# Patient Record
Sex: MALE | Race: BLACK OR AFRICAN AMERICAN | Employment: OTHER | URBAN - METROPOLITAN AREA
[De-identification: names, ages, dates, MRNs, and addresses within clinical notes are randomized per-mention and may not be internally consistent; named-entity substitution may affect disease eponyms.]

---

## 2017-02-16 ENCOUNTER — OFFICE VISIT (OUTPATIENT)
Dept: INTERNAL MEDICINE CLINIC | Age: 57
End: 2017-02-16

## 2017-02-16 VITALS
SYSTOLIC BLOOD PRESSURE: 191 MMHG | WEIGHT: 210 LBS | HEART RATE: 64 BPM | TEMPERATURE: 97.1 F | HEIGHT: 73 IN | DIASTOLIC BLOOD PRESSURE: 100 MMHG | RESPIRATION RATE: 16 BRPM | OXYGEN SATURATION: 98 % | BODY MASS INDEX: 27.83 KG/M2

## 2017-02-16 DIAGNOSIS — I74.9 THROMBOEMBOLUS (HCC): ICD-10-CM

## 2017-02-16 DIAGNOSIS — I10 ESSENTIAL HYPERTENSION: Primary | ICD-10-CM

## 2017-02-16 RX ORDER — ASPIRIN 81 MG/1
TABLET ORAL DAILY
COMMUNITY
End: 2017-08-15

## 2017-02-16 RX ORDER — LISINOPRIL AND HYDROCHLOROTHIAZIDE 12.5; 2 MG/1; MG/1
TABLET ORAL
Qty: 90 TAB | Refills: 1 | Status: SHIPPED | OUTPATIENT
Start: 2017-02-16 | End: 2017-05-12

## 2017-02-16 NOTE — PROGRESS NOTES
1. Have you been to the ER, urgent care clinic since your last visit? Hospitalized since your last visit? No    2. Have you seen or consulted any other health care providers outside of the [address] [place] [address] since your last visit? Include any pap smears or colon screening.  No  Chief Complaint   Patient presents with    Blood Pressure Check     3month follow up     Not fasting

## 2017-02-16 NOTE — MR AVS SNAPSHOT
Visit Information Date & Time Provider Department Dept. Phone Encounter #  
 2/16/2017  8:15 AM Stevenson Walker MD [facility] [phone] [id number] Upcoming Health Maintenance Date Due Hepatitis C Screening 1960 DTaP/Tdap/Td series (1 - Tdap) 12/22/1979 FOBT Q 1 YEAR AGE 50-75 12/22/2008 Allergies as of 2/16/2017  Review Complete On: 2/16/2017 By: Jo Payton Severity Noted Reaction Type Reactions Levaquin [Levofloxacin]  02/25/2015    Rash Current Immunizations  Never Reviewed No immunizations on file. Not reviewed this visit You Were Diagnosed With   
  
 Codes Comments Essential hypertension    -  Primary ICD-10-CM: I10 
ICD-9-CM: 401.9 White coat hypertension     ICD-10-CM: R03.0 ICD-9-CM: 796.2 Thromboembolus ([address]     ICD-10-CM: I74.9 ICD-9-CM: 398. 9 Vitals BP Pulse Temp Resp Height(growth percentile) Weight(growth percentile) (!) 191/100 (BP 1 Location: Left arm, BP Patient Position: Sitting) 64 97.1 °F (36.2 °C) (Oral) 16 6' 1\" (1.854 m) 210 lb (95.3 kg) SpO2 BMI Smoking Status 98% 27.71 kg/m2 Never Smoker Vitals History BMI and BSA Data Body Mass Index Body Surface Area  
 27.71 kg/m 2 2.22 m 2 Preferred Pharmacy Pharmacy Name Phone [id number] [place], [address] [phone] Your Updated Medication List  
  
   
This list is accurate as of: 2/16/17  8:31 AM.  Always use your most recent med list.  
  
  
  
  
 aspirin delayed-release 81 mg tablet Take  by mouth daily. cholecalciferol 1,000 unit Cap Commonly known as:  VITAMIN D3 Take  by mouth.  
  
 lisinopril-hydroCHLOROthiazide 20-12.5 mg per tablet Commonly known as:  PRINZIDE, ZESTORETIC  
TAKE 1 TABLET BY MOUTH EVERY DAY Prescriptions Sent to Pharmacy Refills lisinopril-hydroCHLOROthiazide (PRINZIDE, ZESTORETIC) 20-12.5 mg per tablet 1 Sig: TAKE 1 TABLET BY MOUTH EVERY DAY Class: Normal  
 Pharmacy: [id number] [facility], [address]  #: [phone] [facility] SERVICES! Dear Kp Bahena: Thank you for requesting a [employer] account. Our records indicate that you already have an active [employer] account. You can access your account anytime at [email or web address] [employer]/Surface Tension Did you know that you can access your hospital and ER discharge instructions at any time in [employer]? You can also review all of your test results from your hospital stay or ER visit. Additional Information If you have questions, please visit the Frequently Asked Questions section of the [employer] website at [email or web address] Remember, P[employer]s is NOT to be used for urgent needs. For medical emergencies, dial 911. Now available from your iPhone and Android! Please provide this summary of care documentation to your next provider. Your primary care clinician is listed as [phone] [address] [place] [address] [address]. If you have any questions after today's visit, please call [phone].

## 2017-02-16 NOTE — PROGRESS NOTES
HISTORY OF PRESENT ILLNESS  Benson Gutiérerz is a 60 y.o. male. HPI  Here for htn. He is controlled at home on his med. He is up to date with labs. He has white coat htn. He sees readings of 110-140/70-90. He is not taking his asa daily. Past Medical History   Diagnosis Date    BPH (benign prostatic hyperplasia)     Hypertension     Thromboembolus ([address]      2009 & 2012     Current Outpatient Prescriptions   Medication Sig    lisinopril-hydroCHLOROthiazide (PRINZIDE, ZESTORETIC) 20-12.5 mg per tablet TAKE 1 TABLET BY MOUTH EVERY DAY    Cholecalciferol, Vitamin D3, 1,000 unit cap Take  by mouth.  aspirin delayed-release 81 mg tablet Take  by mouth daily. No current facility-administered medications for this visit. Review of Systems   All other systems reviewed and are negative. Visit Vitals    BP (!) 191/100 (BP 1 Location: Left arm, BP Patient Position: Sitting)    Pulse 64    Temp 97.1 °F (36.2 °C) (Oral)    Resp 16    Ht 6' 1\" (1.854 m)    Wt 210 lb (95.3 kg)    SpO2 98%    BMI 27.71 kg/m2     180 100 my cuff    Physical Exam   Constitutional: He appears well-developed and well-nourished. Nursing note and vitals reviewed. ASSESSMENT and PLAN  Moiz Clark was seen today for blood pressure check. Diagnoses and all orders for this visit:    Essential hypertension  -     lisinopril-hydroCHLOROthiazide (PRINZIDE, ZESTORETIC) 20-12.5 mg per tablet; TAKE 1 TABLET BY MOUTH EVERY DAY  The current medical regimen is effective;  continue present plan and medications.     White coat hypertension    Thromboembolus (HCC)  ASA 81 mg daily    Reviewed plan of care with the patient who has provided input and agrees with the goals

## 2017-04-14 ENCOUNTER — OFFICE VISIT (OUTPATIENT)
Dept: INTERNAL MEDICINE CLINIC | Age: 57
End: 2017-04-14

## 2017-04-14 VITALS
WEIGHT: 204.6 LBS | DIASTOLIC BLOOD PRESSURE: 93 MMHG | HEIGHT: 73 IN | HEART RATE: 76 BPM | RESPIRATION RATE: 16 BRPM | SYSTOLIC BLOOD PRESSURE: 154 MMHG | TEMPERATURE: 99.4 F | BODY MASS INDEX: 27.11 KG/M2

## 2017-04-14 DIAGNOSIS — R50.9 FEVER, UNSPECIFIED FEVER CAUSE: Primary | ICD-10-CM

## 2017-04-14 DIAGNOSIS — M79.10 MYALGIA: ICD-10-CM

## 2017-04-14 LAB
QUICKVUE INFLUENZA TEST: NEGATIVE
VALID INTERNAL CONTROL?: YES

## 2017-04-14 NOTE — PROGRESS NOTES
Subjective:   Nancy Quan is a 60 y.o. male who complains of nasal blockage, dry cough, myalgias, headache and fevers up to 99.4 degrees for 2 days, unchanged since that time. He denies a history of shortness of breath and wheezing. Wife tested + for flu on Sunday. Evaluation to date: none. Treatment to date: taking 200mg advil every 4 hours. .  Patient does not smoke cigarettes. Relevant PMH: htn. Current Outpatient Prescriptions   Medication Sig Dispense Refill    aspirin delayed-release 81 mg tablet Take  by mouth daily.  lisinopril-hydroCHLOROthiazide (PRINZIDE, ZESTORETIC) 20-12.5 mg per tablet TAKE 1 TABLET BY MOUTH EVERY DAY 90 Tab 1    Cholecalciferol, Vitamin D3, 1,000 unit cap Take  by mouth. Review of Systems  Pertinent items are noted in HPI. Objective:     Visit Vitals    BP (!) 154/93 (BP 1 Location: Right arm, BP Patient Position: Sitting)    Pulse 76    Temp 99.4 °F (37.4 °C) (Oral)    Resp 16    Ht 6' 1\" (1.854 m)    Wt 204 lb 9.6 oz (92.8 kg)    BMI 26.99 kg/m2     General:  alert, cooperative, no distress   Eyes: conjunctivae/corneas clear. Ears: Mild fullness bilat TM without erythema   Sinuses: Normal paranasal sinuses without tenderness   Mouth:  Lips, mucosa, and tongue normal. Teeth and gums normal   Neck: supple, symmetrical, trachea midline and no adenopathy. Lungs: clear to auscultation bilaterally   Nares   Clear discharge, mild erythema      POC influenza negative  Assessment/Plan:   viral upper respiratory illness  RTC prn. Advil/tylenol  Lots of fluids, rest.    Orders Placed This Encounter    AMB POC RAPID INFLUENZA TEST     Follow-up Disposition: Not on File.

## 2017-04-14 NOTE — PROGRESS NOTES
Results for orders placed or performed in visit on 04/14/17   AMB POC RAPID INFLUENZA TEST   Result Value Ref Range    VALID INTERNAL CONTROL POC Yes     QuickVue Influenza test Negative Negative

## 2017-04-17 ENCOUNTER — OFFICE VISIT (OUTPATIENT)
Dept: INTERNAL MEDICINE CLINIC | Age: 57
End: 2017-04-17

## 2017-04-17 VITALS
TEMPERATURE: 98.9 F | RESPIRATION RATE: 20 BRPM | HEIGHT: 73 IN | WEIGHT: 203 LBS | HEART RATE: 61 BPM | BODY MASS INDEX: 26.9 KG/M2 | DIASTOLIC BLOOD PRESSURE: 94 MMHG | SYSTOLIC BLOOD PRESSURE: 157 MMHG

## 2017-04-17 DIAGNOSIS — H61.21 HEARING LOSS OF RIGHT EAR DUE TO CERUMEN IMPACTION: Primary | ICD-10-CM

## 2017-04-17 RX ORDER — IBUPROFEN 200 MG
TABLET ORAL
COMMUNITY
End: 2017-08-15

## 2017-04-17 NOTE — MR AVS SNAPSHOT
Visit Information Date & Time Provider Department Dept. Phone Encounter #  
 4/17/2017  7:50 AM Rj Cespedes PA-C [facility] [phone] [id number] Your Appointments 8/15/2017  8:15 AM  
ROUTINE CARE with Zeferino Freeman MD  
[facility]) Appt Note: 6 MONTH F/U  
 [place] [id number] [place] [address] [address] [address] [facility] [phone] Upcoming Health Maintenance Date Due Hepatitis C Screening 1960 DTaP/Tdap/Td series (1 - Tdap) 12/22/1979 FOBT Q 1 YEAR AGE 50-75 12/22/2008 Allergies as of 4/17/2017  Review Complete On: 4/17/2017 By: Rj Cespedes PA-C Severity Noted Reaction Type Reactions Levaquin [Levofloxacin]  02/25/2015    Rash Current Immunizations  Never Reviewed No immunizations on file. Not reviewed this visit You Were Diagnosed With   
  
 Codes Comments Hearing loss of right ear due to cerumen impaction    -  Primary ICD-10-CM: H61.21 ICD-9-CM: 389.8, 380.4 Vitals BP Pulse Temp Resp Height(growth percentile) Weight(growth percentile) (!) 157/94 61 98.9 °F (37.2 °C) (Oral) 20 6' 1\" (1.854 m) 203 lb (92.1 kg) BMI Smoking Status 26.78 kg/m2 Never Smoker Vitals History BMI and BSA Data Body Mass Index Body Surface Area  
 26.78 kg/m 2 2.18 m 2 Preferred Pharmacy [facility] Name Phone [id number], [address] [phone] Your Updated Medication List  
  
   
This list is accurate as of: 4/17/17  8:21 AM.  Always use your most recent med list. ADVIL 200 mg tablet Generic drug:  ibuprofen Take  by mouth. aspirin delayed-release 81 mg tablet Take  by mouth daily. cholecalciferol 1,000 unit Cap Commonly known as:  VITAMIN D3 Take  by mouth.  
  
 lisinopril-hydroCHLOROthiazide 20-12.5 mg per tablet Commonly known as:  PRINZIDE, ZESTORETIC  
TAKE 1 TABLET BY MOUTH EVERY DAY Introducing [facility] & HE[facility] SERVICES! Dear Melony Morris: Thank you for requesting a [employer] account. Our records indicate that you already have an active [employer] account. You can access your account anytime at [email or web address] Did you know that you can access your hospital and ER discharge instructions at any time in Auxogyn? You can also review all of your test results from your hospital stay or ER visit. Additional Information If you have questions, please visit the Frequently Asked Questions section of the [employer] website at [email or web address] Remember, Aux[employer]yn is NOT to be used for urgent needs. For medical emergencies, dial 911. Now available from your iPhone and Android! Please provide this summary of care documentation to your next provider. Your primary care clinician is listed as [phone] [address] [place] [address] [address]. If you have any questions after today's visit, please call [phone].

## 2017-04-17 NOTE — PROGRESS NOTES
Reviewed record in preparation for visit and have obtained necessary documentation. Identified pt with two pt identifiers(name and ). Health Maintenance Due   Topic    Hepatitis C Screening     DTaP/Tdap/Td series (1 - Tdap)    FOBT Q 1 YEAR AGE 50-75          No chief complaint on file. Wt Readings from Last 3 Encounters:   17 203 lb (92.1 kg)   17 204 lb 9.6 oz (92.8 kg)   17 210 lb (95.3 kg)     Temp Readings from Last 3 Encounters:   17 99.4 °F (37.4 °C) (Oral)   17 97.1 °F (36.2 °C) (Oral)   16 98.4 °F (36.9 °C) (Oral)     BP Readings from Last 3 Encounters:   17 (!) 154/93   17 (!) 191/100   16 (!) 184/104     Pulse Readings from Last 3 Encounters:   17 76   17 64   16 60           Learning Assessment:  :     Learning Assessment 2017 1/3/2014   PRIMARY LEARNER Patient Patient   HIGHEST LEVEL OF EDUCATION - PRIMARY LEARNER  SOME COLLEGE SOME COLLEGE   BARRIERS PRIMARY LEARNER NONE NONE   CO-LEARNER CAREGIVER No No   PRIMARY LANGUAGE ENGLISH ENGLISH    NEED No No   LEARNER PREFERENCE PRIMARY PICTURES READING     - LISTENING     - DEMONSTRATION   LEARNING SPECIAL TOPICS - none   ANSWERED BY patient patient   RELATIONSHIP SELF SELF       Depression Screening:  :     PHQ 2 / 9, over the last two weeks 2017   Little interest or pleasure in doing things Not at all   Feeling down, depressed or hopeless Not at all   Total Score PHQ 2 0       Fall Risk Assessment:  :     No flowsheet data found. Abuse Screening:  :     Abuse Screening Questionnaire 2017   Do you ever feel afraid of your partner? N   Are you in a relationship with someone who physically or mentally threatens you? N   Is it safe for you to go home?  Y       Coordination of Care Questionnaire:  :     1) Have you been to an emergency room, urgent care clinic since your last visit? no   Hospitalized since your last visit? no             2) Have you seen or consulted any other health care providers outside of [address] [place] [address] since your last visit? no  (Include any pap smears or colon screenings in this section.)    3) Do you have an Advance Directive on file? no    4) Are you interested in receiving information on Advance Directives? YES      Patient is accompanied by self I have received verbal consent from Mihaela Morrison to discuss any/all medical information while they are present in the room.

## 2017-04-17 NOTE — PROGRESS NOTES
HISTORY OF PRESENT ILLNESS  Becca Huber is a 60 y.o. male. HPI  Patient presents to the office for evaluation of his right ear. He reports he is in the process of getting over a viral infection. He has been having some hearing loss of the right ear. He believes it is due to wax. He denies pain of the ear. He reports he does have some congestion. Review of Systems   HENT: Positive for congestion and hearing loss. Blood pressure (!) 157/94, pulse 61, temperature 98.9 °F (37.2 °C), temperature source Oral, resp. rate 20, height 6' 1\" (1.854 m), weight 203 lb (92.1 kg). Physical Exam   HENT:   Right ear- TM is not visible due to cerumen. Left ear- mild bulging of TM noted. No erythema. Canal is clean and normal in appearance. ASSESSMENT and PLAN  Edmund Quesada was seen today for ear fullness. Diagnoses and all orders for this visit:    Hearing loss of right ear due to cerumen impaction    Patient is going to have his ear irrigated today in the office. He should follow up if needed.

## 2017-05-08 ENCOUNTER — PATIENT MESSAGE (OUTPATIENT)
Dept: INTERNAL MEDICINE CLINIC | Age: 57
End: 2017-05-08

## 2017-05-12 RX ORDER — HYDROCHLOROTHIAZIDE 12.5 MG/1
12.5 TABLET ORAL DAILY
Qty: 90 TAB | Refills: 1 | Status: SHIPPED | OUTPATIENT
Start: 2017-05-12 | End: 2017-08-15

## 2017-05-12 NOTE — TELEPHONE ENCOUNTER
From: Patrica Rachel  To: Harjinder Puri MD  Sent: 5/8/2017 9:42 PM EDT  Subject: Tanesha Goods Dr. Phil Root,    I am feeling better. My readings are below. I believe taking half helped. I am including morning and evening readings.     Garett    4/23 9:05P 134/83, 4/24 4:20A 148/92, 4/24 9:44P 121/78, 4/25 4:30A 152/94, 4/25 9:15P 133/81, 4/26 5:21A 138/90, 4/26 9:48P 124/72, 4/27 4:33A 127/88, 4/27 9:43P 111/71, 4/28 5:14A 135/85, 4/29 8:21A 154/94, 4/29 11:54P 160/98, 4/30 9:29A 147/88, 4/30 9:58P 134/78, 5/1 4:29A 146/85, 5/1 9:17P 128/76, 5/2 5:57A 162/90, 5/2 9:49P 131/80, 5/3 10:26P 122/74, 5/4 4:32A 145/96, 5/4 10:39P 143/86, 5/5 4:30A 144/91, 5/5 11:10p 145/88, 5/6 5:28A 159/99, 5/6 12:14A 158/91, 5/7 10:36A 141/94

## 2017-06-05 ENCOUNTER — OFFICE VISIT (OUTPATIENT)
Dept: INTERNAL MEDICINE CLINIC | Age: 57
End: 2017-06-05

## 2017-06-05 VITALS
OXYGEN SATURATION: 99 % | SYSTOLIC BLOOD PRESSURE: 180 MMHG | RESPIRATION RATE: 20 BRPM | TEMPERATURE: 98.3 F | HEART RATE: 58 BPM | DIASTOLIC BLOOD PRESSURE: 110 MMHG | BODY MASS INDEX: 27.17 KG/M2 | HEIGHT: 73 IN | WEIGHT: 205 LBS

## 2017-06-05 DIAGNOSIS — I10 ESSENTIAL HYPERTENSION: ICD-10-CM

## 2017-06-05 DIAGNOSIS — R00.1 BRADYCARDIA: Primary | ICD-10-CM

## 2017-06-05 DIAGNOSIS — R42 DIZZINESS: ICD-10-CM

## 2017-06-05 NOTE — MR AVS SNAPSHOT
Visit Information Date & Time Provider Department Dept. Phone Encounter #  
 6/5/2017  1:20 PM Constantino Geronimo PA-C [facility] [phone] [id number] Your Appointments 8/15/2017  8:15 AM  
ROUTINE CARE with Delia Batista MD  
[facility]) Appt Note: 6 MONTH F/U  
 [place] [address] [facility] [address]  
[facility] [address] [address] [facility] [phone] Upcoming Health Maintenance Date Due Hepatitis C Screening 1960 DTaP/Tdap/Td series (1 - Tdap) 12/22/1979 FOBT Q 1 YEAR AGE 50-75 12/22/2008 Allergies as of 6/5/2017  Review Complete On: 6/5/2017 By: Constantino Geronimo PA-C Severity Noted Reaction Type Reactions Levaquin [Levofloxacin]  02/25/2015    Rash Current Immunizations  Never Reviewed No immunizations on file. Not reviewed this visit You Were Diagnosed With   
  
 Codes Comments Bradycardia    -  Primary ICD-10-CM: R00.1 ICD-9-CM: 427.89 Dizziness     ICD-10-CM: T59 ICD-9-CM: 780.4 Essential hypertension     ICD-10-CM: I10 
ICD-9-CM: 401.9 Vitals BP Pulse Temp Resp Height(growth percentile) Weight(growth percentile) (!) 180/110 (!) 119 98.3 °F (36.8 °C) (Oral) 20 6' 1\" (1.854 m) 205 lb (93 kg) SpO2 BMI Smoking Status 99% 27.05 kg/m2 Never Smoker Vitals History BMI and BSA Data Body Mass Index Body Surface Area  
 27.05 kg/m 2 2.19 m 2 Preferred Pharmacy Pharmacy Name Phone [id number] [place] [address] [phone] Your Updated Medication List  
  
   
This list is accurate as of: 6/5/17  2:09 PM.  Always use your most recent med list. ADVIL 200 mg tablet Generic drug:  ibuprofen Take  by mouth. aspirin delayed-release 81 mg tablet Take  by mouth daily. cholecalciferol 1,000 unit Cap Commonly known as:  VITAMIN D3 Take  by mouth. hydroCHLOROthiazide 12.5 mg tablet Commonly known as:  HYDRODIURIL Take 1 Tab by mouth daily. We Performed the Following REFERRAL TO CARDIOLOGY [LZA57 Custom] Referral Information Referral ID Referred By Referred To [id number] Cipriano Choudhury MD   
   [address] [address] [place] [facility], [address] [address] Phone: [phone] Visits Status Start Date End Date 1 New Request 6/5/17 6/5/18 If your referral has a status of pending review or denied, additional information will be sent to support the outcome of this decision. Introducing [facility] SERVICES! Dear María Falcon: Thank you for requesting a [employer] account. Our records indicate that you already have an active [employer]'s account. You can access your account anytime at [email or web address]. [employer] Did you know that you can access your hospital and ER discharge instructions at any time in [employer]'s? You can also review all of your test results from your hospital stay or ER visit. Additional Information If you have questions, please visit the Frequently Asked Questions section of the [employer] website at [email or web address]. [employer]/. Remember, [employer] is NOT to be used for urgent needs. For medical emergencies, dial 911. Now available from your iPhone and Android! Please provide this summary of care documentation to your next provider. Your primary care clinician is listed as [phone] [address] [place] [address] [address]. If you have any questions after today's visit, please call [phone].

## 2017-06-05 NOTE — PROGRESS NOTES
HISTORY OF PRESENT ILLNESS  Savanna Peralta is a 60 y.o. male. HPI  Patient presents to the office for a follow up visit to [facility]. He reports he had been having some dizziness and feeling like maybe his heart was skipping a beat on Thursday. Saturday his symptoms were worse so he went to [facility]. He was having dizziness and feeling like his heart was slow. He had some sob as well. He had an EKG done there and blood work. He reports today he is feeling a little better. Review of Systems   Cardiovascular: Negative for chest pain and leg swelling. Neurological: Positive for dizziness. Blood pressure (!) 180/110, pulse (!) 58, temperature 98.3 °F (36.8 °C), temperature source Oral, resp. rate 20, height 6' 1\" (1.854 m), weight 205 lb (93 kg), SpO2 99 %. Physical Exam   Constitutional: He appears well-developed and well-nourished. No distress. Cardiovascular: Normal rate and regular rhythm. No murmur heard. Pulmonary/Chest: Effort normal and breath sounds normal. He has no wheezes. Musculoskeletal: He exhibits no edema. No acute changes on EKg noted. Appears similar to EKG done at [facility].  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 He will call to make an appointment with the cardiologist for evaluation. I have ask him to take readings to take with him to this appointment.

## 2017-06-05 NOTE — PROGRESS NOTES
Reviewed record in preparation for visit and have obtained necessary documentation. Identified pt with two pt identifiers(name and ). Health Maintenance Due   Topic    Hepatitis C Screening     DTaP/Tdap/Td series (1 - Tdap)    FOBT Q 1 YEAR AGE 50-75          No chief complaint on file. Wt Readings from Last 3 Encounters:   17 205 lb (93 kg)   17 203 lb (92.1 kg)   17 204 lb 9.6 oz (92.8 kg)     Temp Readings from Last 3 Encounters:   17 98.3 °F (36.8 °C) (Oral)   17 98.9 °F (37.2 °C) (Oral)   17 99.4 °F (37.4 °C) (Oral)     BP Readings from Last 3 Encounters:   17 (!) 157/94   17 (!) 154/93   17 (!) 191/100     Pulse Readings from Last 3 Encounters:   17 61   17 76   17 64           Learning Assessment:  :     Learning Assessment 2017 1/3/2014   PRIMARY LEARNER Patient Patient   HIGHEST LEVEL OF EDUCATION - PRIMARY LEARNER  SOME COLLEGE SOME COLLEGE   BARRIERS PRIMARY LEARNER NONE NONE   CO-LEARNER CAREGIVER No No   PRIMARY LANGUAGE ENGLISH ENGLISH    NEED No No   LEARNER PREFERENCE PRIMARY PICTURES READING     - LISTENING     - DEMONSTRATION   LEARNING SPECIAL TOPICS - none   ANSWERED BY patient patient   RELATIONSHIP SELF SELF       Depression Screening:  :     PHQ over the last two weeks 2017   Little interest or pleasure in doing things Not at all   Feeling down, depressed or hopeless Not at all   Total Score PHQ 2 0       Fall Risk Assessment:  :     No flowsheet data found. Abuse Screening:  :     Abuse Screening Questionnaire 2017   Do you ever feel afraid of your partner? N   Are you in a relationship with someone who physically or mentally threatens you? N   Is it safe for you to go home? Y       Coordination of Care Questionnaire:  :     1) Have you been to an emergency room, urgent care clinic since your last visit?  yes   Hospitalized since your last visit? no             2) Have you seen or consulted any other health care providers outside of [address] [place] [address] since your last visit? yes  (Include any pap smears or colon screenings in this section.)    3) Do you have an Advance Directive on file? yes    4) Are you interested in receiving information on Advance Directives? NO      Patient is accompanied by self I have received verbal consent from Juan Miguel Kang to discuss any/all medical information while they are present in the room.

## 2017-06-22 ENCOUNTER — OFFICE VISIT (OUTPATIENT)
Dept: CARDIOLOGY CLINIC | Age: 57
End: 2017-06-22

## 2017-06-22 VITALS
WEIGHT: 202.8 LBS | DIASTOLIC BLOOD PRESSURE: 100 MMHG | BODY MASS INDEX: 26.88 KG/M2 | HEART RATE: 60 BPM | SYSTOLIC BLOOD PRESSURE: 166 MMHG | HEIGHT: 73 IN

## 2017-06-22 DIAGNOSIS — I74.9 THROMBOEMBOLUS (HCC): ICD-10-CM

## 2017-06-22 DIAGNOSIS — I10 ESSENTIAL HYPERTENSION: ICD-10-CM

## 2017-06-22 DIAGNOSIS — R00.2 PALPITATIONS: Primary | ICD-10-CM

## 2017-06-22 RX ORDER — AMLODIPINE BESYLATE 2.5 MG/1
2.5 TABLET ORAL DAILY
Qty: 30 TAB | Refills: 11 | Status: SHIPPED | OUTPATIENT
Start: 2017-06-22 | End: 2017-07-26

## 2017-06-22 NOTE — PROGRESS NOTES
HISTORY OF PRESENT ILLNESS  Wing Cisneros is a 60 y.o. male. He is referred for evaluation of palpitations. He had these a few weeks ago. They are somewhat better now, but he still has some most days between 1:00 and 3:00 in the afternoon. They are characterized as a racing heart. He also had some shortness of breath. He normally exercises thirty minutes every day doing elliptical or lifting weights without difficulty. He has no family history of premature heart disease. He has had hypertension for twenty-five years. He does not smoke or drink alcohol and does not have diabetes. He was on Lisinopril with hydrochlorothiazide up until recently and the hydrochlorothiazide was discontinued, but then restarted. He may have taken Lisinopril 20 mg previously, but is now on 10 mg with 12.5 mg of hydrochlorothiazide. His blood pressure taken at home ranges between 122/75 and 170/100. More than five years ago, he had a venous thromboembolism in his right leg. He has tried Norvasc before, but it was switched to his current medicine, but not because of side effects. He may have white coat hypertension. He takes his blood pressure at home two times per day. HPI  Patient Active Problem List   Diagnosis Code    Hypertension I10    [facility] I74.9    BPH (benign prostatic hyperplasia) N40.0    ED (erectile dysfunction) N52.9    White coat hypertension [id number]    Palpitations R00.2     Current Outpatient Prescriptions   Medication Sig Dispense Refill    amLODIPine (NORVASC) 2.5 mg tablet Take 1 Tab by mouth daily. 30 Tab 11    hydroCHLOROthiazide (HYDRODIURIL) 12.5 mg tablet Take 1 Tab by mouth daily. 90 Tab 1    ibuprofen (ADVIL) 200 mg tablet Take  by mouth.  aspirin delayed-release 81 mg tablet Take  by mouth daily.  Cholecalciferol, Vitamin D3, 1,000 unit cap Take  by mouth.        Past Medical History:   Diagnosis Date    BPH (benign prostatic hyperplasia)     Hypertension     Thromboembolus [facility])     2009 & 2012     Past Surgical History:   Procedure Laterality Date    HX COLONOSCOPY  2012       Review of Systems   Cardiovascular: Positive for palpitations. All other systems reviewed and are negative. Visit Vitals    BP (!) 166/100    Pulse 60    Ht 6' 1\" (1.854 m)    Wt 202 lb 12.8 oz (92 kg)    BMI 26.76 kg/m2       Physical Exam   Constitutional: He is oriented to person, place, and time. He appears well-nourished. HENT:   Head: Atraumatic. Eyes: Conjunctivae are normal. No scleral icterus. Neck: Neck supple. Cardiovascular: Normal rate, regular rhythm and normal heart sounds. Exam reveals no gallop and no friction rub. No murmur heard. Pulmonary/Chest: Breath sounds normal. He has no wheezes. Abdominal: Bowel sounds are normal.   Musculoskeletal: He exhibits no edema. Neurological: He is oriented to person, place, and time. Skin: Skin is dry. Nursing note and vitals reviewed. ASSESSMENT and PLAN  He certainly has high blood pressure and I believe that this should be addressed initially. Possibly, his palpitations will disappear. His heart rate is in the low 60s and, therefore, he cannot take a betablocker. For now, I will increase his Lisinopril to 20/25 mg per day by having him take two of his current pills. I will also add Norvasc 2.5 mg per day. He will return in about two weeks for an echocardiogram to assess for left ventricular hypertrophy and left ventricular function. I will see him afterward, hoping to switch him eventually back to a one pill per day Lisinopril with hydrochlorothiazide regimen.

## 2017-06-22 NOTE — MR AVS SNAPSHOT
Visit Information Date & Time Provider Department Dept. Phone Encounter #  
 6/22/2017  1:40 PM Ney Betancur MD [facility] Tayo Esteban [phone] [id number] Your Appointments 8/15/2017  8:15 AM  
ROUTINE CARE with Mariola Wolf MD  
[facility] [address] [facility]) Appt Note: 6 MONTH F/U  
 [place] [address] [facility] [address]  
[address] [facility] [address] [facility] [address] Upcoming Health Maintenance Date Due Hepatitis C Screening 1960 DTaP/Tdap/Td series (1 - Tdap) 12/22/1979 FOBT Q 1 YEAR AGE 50-75 12/22/2008 Allergies as of 6/22/2017  Review Complete On: 6/22/2017 By: Rod Kenyon LPN Severity Noted Reaction Type Reactions Levaquin [Levofloxacin]  02/25/2015    Rash Current Immunizations  Never Reviewed No immunizations on file. Not reviewed this visit You Were Diagnosed With   
  
 Codes Comments Palpitations    -  Primary ICD-10-CM: R00.2 ICD-9-CM: 785.1 Vitals BP Pulse Height(growth percentile) Weight(growth percentile) BMI Smoking Status (!) 166/100 60 6' 1\" (1.854 m) 202 lb 12.8 oz (92 kg) 26.76 kg/m2 Never Smoker Vitals History BMI and BSA Data Body Mass Index Body Surface Area  
 26.76 kg/m 2 2.18 m 2 Preferred Pharmacy Pharmacy Name Phone [id number] [place] [address] [phone] Your Updated Medication List  
  
   
This list is accurate as of: 6/22/17  2:21 PM.  Always use your most recent med list. ADVIL 200 mg tablet Generic drug:  ibuprofen Take  by mouth. aspirin delayed-release 81 mg tablet Take  by mouth daily. cholecalciferol 1,000 unit Cap Commonly known as:  VITAMIN D3 Take  by mouth. hydroCHLOROthiazide 12.5 mg tablet Commonly known as:  HYDRODIURIL  
 Take 1 Tab by mouth daily. We Performed the Following AMB POC EKG ROUTINE W/ 12 LEADS, INTER & REP [17762 CPT(R)] Introducing [facility] & HEALTH SERVICES! Dear Dominik Constantino: Thank you for requesting a [email or web address] account. Our records indicate that you already have an active [email or web address] account. You can access your account anytime at [email or web address]. [email or web address] Did you know that you can access your hospital and ER discharge instructions at any time in [email or web address]? You can also review all of your test results from your hospital stay or ER visit. Additional Information If you have questions, please visit the Frequently Asked Questions section of the [email or web address] website at [email or web address] Remember, [email or web address] is NOT to be used for urgent needs. For medical emergencies, dial 911. Now available from your iPhone and Android! Please provide this summary of care documentation to your next provider. Your primary care clinician is listed as [phone] [address] [place] [address] [address]. If you have any questions after today's visit, please call [phone].

## 2017-07-10 ENCOUNTER — CLINICAL SUPPORT (OUTPATIENT)
Dept: CARDIOLOGY CLINIC | Age: 57
End: 2017-07-10

## 2017-07-10 ENCOUNTER — OFFICE VISIT (OUTPATIENT)
Dept: CARDIOLOGY CLINIC | Age: 57
End: 2017-07-10

## 2017-07-10 VITALS
BODY MASS INDEX: 27.11 KG/M2 | HEIGHT: 73 IN | SYSTOLIC BLOOD PRESSURE: 158 MMHG | WEIGHT: 204.6 LBS | HEART RATE: 60 BPM | DIASTOLIC BLOOD PRESSURE: 90 MMHG

## 2017-07-10 DIAGNOSIS — I10 ESSENTIAL HYPERTENSION: ICD-10-CM

## 2017-07-10 DIAGNOSIS — R00.2 PALPITATIONS: Primary | ICD-10-CM

## 2017-07-10 DIAGNOSIS — R00.2 PALPITATIONS: ICD-10-CM

## 2017-07-10 DIAGNOSIS — I74.9 THROMBOEMBOLUS (HCC): ICD-10-CM

## 2017-07-10 NOTE — PROGRESS NOTES
HISTORY OF PRESENT ILLNESS  Elizabeth Gillette is a 60 y.o. male. He has hypertension and palpitations. When I last saw him, he was started on amlodipine 2.5 mg per day and his palpitations are essentially gone. He also has white coat hypertension. His blood pressure at home ranges between 311 and 597 systolic, but it is higher today. He was supposed to return early for an echo, but he is going to have the echo later today since he did not arrive in time. HPI    Review of Systems   Cardiovascular: Positive for palpitations. All other systems reviewed and are negative. Visit Vitals    /90    Pulse 60    Ht 6' 1\" (1.854 m)    Wt 204 lb 9.6 oz (92.8 kg)    BMI 26.99 kg/m2     Patient Active Problem List   Diagnosis Code    Hypertension I10    Thromboembolus ([facility] I74.9    BPH (benign prostatic hyperplasia) N40.0    ED (erectile dysfunction) N52.9    White coat hypertension [id number]    Palpitations R00.2     Current Outpatient Prescriptions   Medication Sig Dispense Refill    amLODIPine (NORVASC) 2.5 mg tablet Take 1 Tab by mouth daily. 30 Tab 11    hydroCHLOROthiazide (HYDRODIURIL) 12.5 mg tablet Take 1 Tab by mouth daily. 90 Tab 1    ibuprofen (ADVIL) 200 mg tablet Take  by mouth.  aspirin delayed-release 81 mg tablet Take  by mouth daily.  Cholecalciferol, Vitamin D3, 1,000 unit cap Take  by mouth. Past Medical History:   Diagnosis Date    BPH (benign prostatic hyperplasia)     Hypertension     Thromboembolus [facility])     2009 & 2012     Past Surgical History:   Procedure Laterality Date    HX COLONOSCOPY  2012       Physical Exam   Constitutional: He is oriented to person, place, and time. He appears well-nourished. HENT:   Head: Atraumatic. Eyes: Conjunctivae are normal.   Neck: Neck supple. Cardiovascular: Normal rate, regular rhythm and normal heart sounds. Exam reveals no gallop and no friction rub. No murmur heard.   Pulmonary/Chest: Breath sounds normal. He has no wheezes. Abdominal: Bowel sounds are normal.   Musculoskeletal: He exhibits no edema. Neurological: He is oriented to person, place, and time. Skin: Skin is dry. Nursing note and vitals reviewed. ASSESSMENT and PLAN  I talked to him today about possibly increasing the Norvasc to 5 mg per day, but he would like to leave things as they are, especially since his palpitations have resolved. His heart rate is still about 60 beats per minute so he cannot tolerate a betablocker. I will plan to see him in the future on an as-needed basis and we will call him regarding the results of his echo.

## 2017-07-10 NOTE — MR AVS SNAPSHOT
Visit Information Date & Time Provider Department Dept. Phone Encounter #  
 7/10/2017  1:40 PM Dana Wharton MD [facility] Promise Mccarthy [phone] [id number] Your Appointments 8/15/2017  8:15 AM  
ROUTINE CARE with Olga Lidia Ponce MD  
[facility]) Appt Note: 6 MONTH F/U  
 [place] [address] [facility] [address]  
[facility] [address] [address] Upcoming Health Maintenance Date Due Hepatitis C Screening 1960 DTaP/Tdap/Td series (1 - Tdap) 12/22/1979 FOBT Q 1 YEAR AGE 50-75 12/22/2008 Allergies as of 7/10/2017  Review Complete On: 7/10/2017 By: Carlos Manuel Dowling LPN Severity Noted Reaction Type Reactions Levaquin [Levofloxacin]  02/25/2015    Rash Current Immunizations  Never Reviewed No immunizations on file. Not reviewed this visit Vitals BP Pulse Height(growth percentile) Weight(growth percentile) BMI Smoking Status 158/90 60 6' 1\" (1.854 m) 204 lb 9.6 oz (92.8 kg) 26.99 kg/m2 Never Smoker BMI and BSA Data Body Mass Index Body Surface Area  
 26.99 kg/m 2 2.19 m 2 Preferred Pharmacy Pharmacy Name Phone [id number] [place], [address] [phone] Your Updated Medication List  
  
   
This list is accurate as of: 7/10/17  1:56 PM.  Always use your most recent med list. ADVIL 200 mg tablet Generic drug:  ibuprofen Take  by mouth. amLODIPine 2.5 mg tablet Commonly known as:  Leward Ellis Take 1 Tab by mouth daily. aspirin delayed-release 81 mg tablet Take  by mouth daily. cholecalciferol 1,000 unit Cap Commonly known as:  VITAMIN D3 Take  by mouth. hydroCHLOROthiazide 12.5 mg tablet Commonly known as:  HYDRODIURIL Take 1 Tab by mouth daily. Introducing [facility] & HEALTH SERVICES! Dear Wendell Cockayne: Thank you for requesting a [employer] account. Our records indicate that you already have an active [employer] account. You can access your account anytime at [email or web address] Did you know that you can access your hospital and ER discharge instructions at any time in [employer]? You can also review all of your test results from your hospital stay or ER visit. Additional Information If you have questions, please visit the Frequently Asked Questions section of the [employer] website at [email or web address] Remember, [employer] is NOT to be used for urgent needs. For medical emergencies, dial 911. Now available from your iPhone and Android! Please provide this summary of care documentation to your next provider. Your primary care clinician is listed as [phone] [address] [place] [address] [address]. If you have any questions after today's visit, please call [phone].

## 2017-07-24 ENCOUNTER — TELEPHONE (OUTPATIENT)
Dept: CARDIOLOGY CLINIC | Age: 57
End: 2017-07-24

## 2017-07-24 NOTE — TELEPHONE ENCOUNTER
Pt returning Bella's phone call. Patient stated he has My Chart and saw his results but if you need to speak with him he can be reached at [phone].   Thanks, AP

## 2017-07-26 ENCOUNTER — HOSPITAL ENCOUNTER (EMERGENCY)
Age: 57
Discharge: HOME | End: 2017-07-26
Attending: EMERGENCY MEDICINE
Payer: COMMERCIAL

## 2017-07-26 VITALS
SYSTOLIC BLOOD PRESSURE: 196 MMHG | HEART RATE: 57 BPM | WEIGHT: 200 LBS | OXYGEN SATURATION: 98 % | RESPIRATION RATE: 18 BRPM | DIASTOLIC BLOOD PRESSURE: 108 MMHG | HEIGHT: 72 IN | TEMPERATURE: 98.8 F | BODY MASS INDEX: 27.09 KG/M2

## 2017-07-26 DIAGNOSIS — I10 ESSENTIAL HYPERTENSION: ICD-10-CM

## 2017-07-26 DIAGNOSIS — I82.531: ICD-10-CM

## 2017-07-26 DIAGNOSIS — M10.9 ACUTE GOUT INVOLVING TOE OF RIGHT FOOT, UNSPECIFIED CAUSE: Primary | ICD-10-CM

## 2017-07-26 PROCEDURE — 93971 EXTREMITY STUDY: CPT

## 2017-07-26 PROCEDURE — 74011250637 HC RX REV CODE- 250/637: Performed by: NURSE PRACTITIONER

## 2017-07-26 PROCEDURE — 99284 EMERGENCY DEPT VISIT MOD MDM: CPT

## 2017-07-26 RX ORDER — AMLODIPINE BESYLATE 10 MG/1
5 TABLET ORAL DAILY
Qty: 10 TAB | Refills: 0 | Status: SHIPPED | OUTPATIENT
Start: 2017-07-26 | End: 2017-08-15

## 2017-07-26 RX ORDER — CLONIDINE HYDROCHLORIDE 0.1 MG/1
0.2 TABLET ORAL
Status: COMPLETED | OUTPATIENT
Start: 2017-07-26 | End: 2017-07-26

## 2017-07-26 RX ORDER — PROBENECID AND COLCHICINE 500; .5 MG/1; MG/1
1 TABLET ORAL DAILY
Qty: 7 TAB | Refills: 0 | Status: SHIPPED | OUTPATIENT
Start: 2017-07-26 | End: 2017-08-15

## 2017-07-26 RX ADMIN — CLONIDINE HYDROCHLORIDE 0.2 MG: 0.1 TABLET ORAL at 12:44

## 2017-07-26 NOTE — PROCEDURES
[facility] TATIANA DORADO  *** FINAL REPORT ***    Name: Neo Reyez  MRN: [id number]  : 22 Dec 1960  HIS Order #: [id number]  Zach Aldrich Visit #: [id number]  Date: 2017    TYPE OF TEST: Peripheral Venous Testing    REASON FOR TEST  Pain in limb, Limb swelling, hx RLE DVT    Right Leg:-  Deep venous thrombosis:           Yes  Proximal extent of thrombus:      Popliteal At The Knee  Superficial venous thrombosis:    No  Deep venous insufficiency:        Not examined  Superficial venous insufficiency: Not examined      INTERPRETATION/FINDINGS  PROCEDURE:  Color duplex ultrasound imaging of lower extremity veins. FINDINGS:       Right: Consistent with thrombosis involving the popliteal vein as   demonstrated by vein non-compressibility, and by a narrowing or  occlusion of the flow channel on color Doppler imaging. The popliteal   contains collateralized flow. The remaining segments; common  femoral, deep femoral, femoral, posterior tibial, peroneal, and great  saphenous are patent and without evidence of thrombus; each is is  fully compressible and there is no narrowing of the flow channel on  color Doppler imaging. There is limited visualization of the peroneal  veins. Phasic flow is observed in the common femoral vein. Left:   The common femoral vein is patent and without evidence of   thrombus. Phasic flow is observed. This extremity was not otherwise   evaluated. IMPRESSION:  No evidence of right lower extremity acute vein  thrombosis. Consistent with chronic popliteal vein thrombus. ADDITIONAL COMMENTS    I have personally reviewed the data relevant to the interpretation of  this  study.     TECHNOLOGIST: Cherrie Garrett RVT  Signed: 2017 11:44 AM    PHYSICIAN: Chris Cho MD  Signed: 2017 06:50 AM

## 2017-07-26 NOTE — ED PROVIDER NOTES
HPI Comments: May Ku is a 59 yo BM presenting to ED via car with c/o Sent by Pt. First for right foot with pain that radiates up into calf. Did take long flight few days ago from [facility] to [place] and from [place] back to [place] [address]. Hx blood clot. States +redness. Pt states that he has a hx of DVT for which he was treated with Heparin greater than 5 years ago. Pt states that he has had US in the past for DVT which has been negative. PCP: Arjun Ceron MD    There were no other complaints, changes, physical findings at this time. Patient is a 60 y.o. male presenting with foot swelling. The history is provided by the patient. No  was used. Foot Swelling    This is a new problem. The problem has not changed since onset. The pain is present in the right foot. The pain is at a severity of 5/10. The pain is moderate. Associated symptoms include numbness. Pertinent negatives include no neck pain. The symptoms are aggravated by movement. He has tried nothing for the symptoms. The treatment provided no relief. There has been no history of extremity trauma. history of DVT        Past Medical History:   Diagnosis Date    BPH (benign prostatic hyperplasia)     Hypertension     Thromboembolus ([address]     2009 & 2012       Past Surgical History:   Procedure Laterality Date    HX COLONOSCOPY  2012         Family History:   Problem Relation Age of Onset    Cancer Mother      multiple myeloma    Diabetes Mother     Hypertension Mother     Stroke Mother     Cancer Father      pancreatic    Heart Disease Father     Hypertension Father        Social History     Social History    Marital status:      Spouse name: N/A    Number of children: N/A    Years of education: N/A     Occupational History    Not on file.      Social History Main Topics    Smoking status: Never Smoker    Smokeless tobacco: Never Used    Alcohol use No    Drug use: No    Sexual activity: Yes     Partners: Female      Comment:  [employer]     Other Topics Concern    Not on file     Social History Narrative         ALLERGIES: Levaquin [levofloxacin]    Review of Systems   Constitutional: Negative. Negative for chills, diaphoresis and fever. HENT: Negative. Negative for congestion, rhinorrhea and trouble swallowing. Eyes: Negative. Respiratory: Negative. Negative for shortness of breath. Cardiovascular: Negative. Gastrointestinal: Negative. Negative for abdominal pain, nausea and vomiting. Endocrine: Negative. Musculoskeletal: Positive for arthralgias and gait problem. Negative for myalgias, neck pain and neck stiffness. Skin: Positive for color change. Negative for rash. Painful red right great toe   Allergic/Immunologic: Negative. Neurological: Positive for numbness. Negative for dizziness, syncope, weakness and headaches. Hematological: Negative. Psychiatric/Behavioral: Negative. Vitals:    07/26/17 1229 07/26/17 1231 07/26/17 1314 07/26/17 1351   BP: (!) 220/120 (!) 212/118 (!) 215/106 (!) 196/108   Pulse: (!) 57      Resp: 18      Temp:       SpO2: 98%      Weight:       Height:                Physical Exam   Constitutional: He is oriented to person, place, and time. Vital signs are normal. He appears well-developed and well-nourished. Non-toxic appearance. He does not have a sickly appearance. He does not appear ill. HENT:   Head: Normocephalic and atraumatic. Eyes: Conjunctivae, EOM and lids are normal. Pupils are equal, round, and reactive to light. Neck: Trachea normal, normal range of motion and full passive range of motion without pain. Neck supple. Cardiovascular: Normal rate, regular rhythm, normal heart sounds and normal pulses. Pulmonary/Chest: Effort normal and breath sounds normal.   Abdominal: Soft. Normal appearance and bowel sounds are normal.   Musculoskeletal: Normal range of motion.         Right lower leg: He exhibits swelling. Legs:       Right foot: There is tenderness. Feet:    Neurological: He is alert and oriented to person, place, and time. He has normal strength. GCS eye subscore is 4. GCS verbal subscore is 5. GCS motor subscore is 6. Skin: Skin is warm, dry and intact. Psychiatric: He has a normal mood and affect. His speech is normal and behavior is normal. Judgment and thought content normal. Cognition and memory are normal.   Nursing note and vitals reviewed. MDM  Number of Diagnoses or Management Options  Acute gout involving toe of right foot, unspecified cause: minor  Chronic thromboembolism of popliteal vein, right ([facility] [address]): minor  Essential hypertension: minor     Amount and/or Complexity of Data Reviewed  Tests in the radiology section of CPT®: ordered  Discuss the patient with other providers: yes ([facility] and [place])    Risk of Complications, Morbidity, and/or Mortality  Presenting problems: moderate  Diagnostic procedures: moderate  Management options: low    Patient Progress  Patient progress: stable    ED Course       Procedures    CONSULT NOTE:   12:15 PM  Mello Constantino FNP-BC spoke with Kalie Cooper,   Specialty: Vasc Surgery  Discussed pt's hx, disposition, and available diagnostic and imaging results. Reviewed care plans. Consultant agrees with plans as outlined. Agrees with the dx. Will order 20-30 mm below knee compression stocking. Will have pt f/u with Dr Kalie Cooper in 4-8 weeks. Will have patient tell the office he needs a venous doppler. 12:31 PM  I have just reevaluated the patient. I have reviewed His vital signs and determined there is currently no worsening in their condition or physical exam.  Results have been reviewed with them and their questions have been answered. We will continue to review further results as they come available. A repeat BP shows that the patient is still hypertensive.   I will give him a little catapres and once his BP comes down, I will d/c him home with some additional BP medications. Beatrice Boast Pagé FNP-BC    2:06 PM  I have just reevaluated the patient. I have reviewed His vital signs and determined there is currently no worsening in their condition or physical exam.  Results have been reviewed with them and their questions have been answered. We will continue to review further results as they come available. The patient's BP is coming down. I will d/c him home with some additional BP meds and have him f/u with PCP    Beatrice Boast Pagé FNP-BC      LABORATORY TESTS:  No results found for this or any previous visit (from the past 12 hour(s)). IMAGING RESULTS:    CT Results  (Last 48 hours)    None        PFT Results  (Last 48 hours)    None        Echo Results  (Last 48 hours)    None        CXR Results  (Last 48 hours)    None        VENOUS DOPPLER results  (Last 48 hours)    None            []Hide copied text  []Hover for attribution information     [place]  PRELIMINARY REPORT      Name: Marbella [place]  MRN: [id number]  : 22 Dec 1960  HIS Order #: [id number]  [id number] [facility] Visit #: [id number]  Date: 2017     TYPE OF TEST: Peripheral Venous Testing     REASON FOR TEST  Pain in limb, Limb swelling, hx RLE DVT     Right Leg:-  Deep venous thrombosis:           No  Superficial venous thrombosis:    No  Deep venous insufficiency:        Not examined  Superficial venous insufficiency: Not examined        INTERPRETATION/FINDINGS  PROCEDURE:  Color duplex ultrasound imaging of lower extremity veins.     FINDINGS:       Right: Consistent with thrombosis involving the popliteal vein as   demonstrated by vein non-compressibility, and by a narrowing or  occlusion of the flow channel on color Doppler imaging. The popliteal   contains collateralized flow.   The remaining segments; common  femoral, deep femoral, femoral, posterior tibial, peroneal, and great  saphenous are patent and without evidence of thrombus; each is is  fully compressible and there is no narrowing of the flow channel on  color Doppler imaging. There is limited visualization of the peroneal  veins. Phasic flow is observed in the common femoral vein. Left:   The common femoral vein is patent and without evidence of   thrombus. Phasic flow is observed. This extremity was not otherwise   evaluated.     IMPRESSION:  No evidence of right lower extremity acute vein  thrombosis. Consistent with chronic popliteal vein thrombus.     ADDITIONAL COMMENTS     I have personally reviewed the data relevant to the interpretation of  this study.     TECHNOLOGIST: Marielle Perez RVT  Signed: 07/26/2017 11:44 AM            MEDICATIONS GIVEN:  Medications   cloNIDine HCl (CATAPRES) tablet 0.2 mg (0.2 mg Oral Given 7/26/17 1244)       IMPRESSION:  1. Acute gout involving toe of right foot, unspecified cause    2. Chronic thromboembolism of popliteal vein, right ([address]    3. Essential hypertension        PLAN:  1. Colchicine and Probenecid x 7days  2. 20-30 mmHg below the knee compression stocking  3. F/U with PCP   4. F/U with Dr Stephon Herrera in 4-8 weeks  Return to ED if worse    Discharge Note  2:10 PM  The patient is ready for discharge. The patient's signs, symptoms, diagnosis, and discharge instructions have been discussed and the patient has conveyed their understanding. The patient is to follow up as recommended or return to the ER should their symptoms worsen. Plan has been discussed and the patient is in agreement. Radha Donaldson Pagé FNP-BC.

## 2017-07-26 NOTE — ED TRIAGE NOTES
Sent by Pt. First for right foot with pain that radiates up into calf. Did take long flight few days ago. Hx blood clot. States +redness.  Unable to visualize in triage

## 2017-07-26 NOTE — DISCHARGE INSTRUCTIONS
Acute High Blood Pressure: Care Instructions  Your Care Instructions  Acute high blood pressure is very high blood pressure. It's a serious problem. Very high blood pressure can damage your brain, heart, eyes, and kidneys. You may have been given medicines to lower your blood pressure. You may have gotten them as pills or through a needle in one of your veins. This is called an IV. And maybe you were given other medicines too. These can be needed when high blood pressure causes other problems. To keep your blood pressure at a lower level, you may need to make healthy lifestyle changes. And you will probably need to take medicines. Be sure to follow up with your doctor about your blood pressure and what you can do about it. Follow-up care is a key part of your treatment and safety. Be sure to make and go to all appointments, and call your doctor if you are having problems. It's also a good idea to know your test results and keep a list of the medicines you take. How can you care for yourself at home? · See your doctor as often as he or she recommends. This is to make sure your blood pressure is under control. You will probably go at least 2 times a year. But it may be more often at first.  · Take your blood pressure medicine exactly as prescribed. You may take one or more types. They include diuretics, beta-blockers, ACE inhibitors, calcium channel blockers, and angiotensin II receptor blockers. Call your doctor if you think you are having a problem with your medicine. · If you take blood pressure medicine, talk to your doctor before you take decongestants or anti-inflammatory medicine, such as ibuprofen. These can raise blood pressure. · Learn how to check your blood pressure at home. Check it often. · Ask your doctor if it's okay to drink alcohol. · Talk to your doctor about lifestyle changes that can help blood pressure. These include being active and not smoking.   When should you call for help?  Call 911 anytime you think you may need emergency care. This may mean having symptoms that suggest that your blood pressure is causing a serious heart or blood vessel problem. Your blood pressure may be over 180/110. For example, call 911 if:  · You have symptoms of a heart attack. These may include:  ¨ Chest pain or pressure, or a strange feeling in the chest.  ¨ Sweating. ¨ Shortness of breath. ¨ Nausea or vomiting. ¨ Pain, pressure, or a strange feeling in the back, neck, jaw, or upper belly or in one or both shoulders or arms. ¨ Lightheadedness or sudden weakness. ¨ A fast or irregular heartbeat. · You have symptoms of a stroke. These may include:  ¨ Sudden numbness, tingling, weakness, or loss of movement in your face, arm, or leg, especially on only one side of your body. ¨ Sudden vision changes. ¨ Sudden trouble speaking. ¨ Sudden confusion or trouble understanding simple statements. ¨ Sudden problems with walking or balance. ¨ A sudden, severe headache that is different from past headaches. · You have severe back or belly pain. Do not wait until your blood pressure comes down on its own. Get help right away. Call your doctor now or seek immediate care if:  · Your blood pressure is much higher than normal (such as 180/110 or higher), but you don't have symptoms. · You think high blood pressure is causing symptoms, such as:  ¨ Severe headache. ¨ Blurry vision. Watch closely for changes in your health, and be sure to contact your doctor if:  · Your blood pressure measures 140/90 or higher at least 2 times. That means the top number is 140 or higher or the bottom number is 90 or higher, or both. · You think you may be having side effects from your blood pressure medicine. · Your blood pressure is usually normal, but it goes above normal at least 2 times. Where can you learn more? Go to http://chai-anahy.info/.   Enter B543 in the search box to learn more about \"Acute High Blood Pressure: Care Instructions. \"  Current as of: August 8, 2016  Content Version: 11.3  © 1573-4020 [employer]. Care instructions adapted under license by [employer] (which disclaims liability or warranty for this information). If you have questions about a medical condition or this instruction, always ask your healthcare professional. [address] any warranty or liability for your use of this information. Gout: Care Instructions  Your Care Instructions  Gout is a form of arthritis caused by a buildup of uric acid crystals in a joint. It causes sudden attacks of pain, swelling, redness, and stiffness, usually in one joint, especially the big toe. Gout usually comes on without a cause. But it can be brought on by drinking alcohol (especially beer) or eating seafood and red meat. Taking certain medicines, such as diuretics or aspirin, also can bring on an attack of gout. Taking your medicines as prescribed and following up with your doctor regularly can help you avoid gout attacks in the future. Follow-up care is a key part of your treatment and safety. Be sure to make and go to all appointments, and call your doctor if you are having problems. Its also a good idea to know your test results and keep a list of the medicines you take. How can you care for yourself at home? · If the joint is swollen, put ice or a cold pack on the area for 10 to 20 minutes at a time. Put a thin cloth between the ice and your skin. · Prop up the sore limb on a pillow when you ice it or anytime you sit or lie down during the next 3 days. Try to keep it above the level of your heart. This will help reduce swelling. · Rest sore joints. Avoid activities that put weight or strain on the joints for a few days. Take short rest breaks from your regular activities during the day. · Take your medicines exactly as prescribed.  Call your doctor if you think you are having a problem with your medicine. · Take pain medicines exactly as directed. ¨ If the doctor gave you a prescription medicine for pain, take it as prescribed. ¨ If you are not taking a prescription pain medicine, ask your doctor if you can take an over-the-counter medicine. · Eat less seafood and red meat. · Check with your doctor before drinking alcohol. · Losing weight, if you are overweight, may help reduce attacks of gout. But do not go on a [employer]. \" Losing a lot of weight in a short amount of time can cause a gout attack. When should you call for help? Call your doctor now or seek immediate medical care if:  · You have a fever. · The joint is so painful you cannot use it. · You have sudden, unexplained swelling, redness, warmth, or severe pain in one or more joints. Watch closely for changes in your health, and be sure to contact your doctor if:  · You have joint pain. · Your symptoms get worse or are not improving after 2 or 3 days. Where can you learn more? Go to http://chai-anahy.info/. Enter Q545 in the search box to learn more about \"Gout: Care Instructions. \"  Current as of: October 31, 2016  Content Version: 11.3  © 8008-6177 [address]. Care instructions adapted under license by [employer] (which disclaims liability or warranty for this information). If you have questions about a medical condition or this instruction, always ask your healthcare professional. [place] [address] any warranty or liability for your use of this information. Deep Vein Thrombosis: Care Instructions  Your Care Instructions    A deep vein thrombosis (DVT) is a blood clot in certain veins of the legs, pelvis, or arms. Blood clots in these veins need to be treated because they can get bigger, break loose, and travel through the bloodstream to the lungs. A blood clot in a lung can be life-threatening.   The doctor may have given you a blood thinner (anticoagulant). A blood thinner can stop the blood clot from growing larger and prevent new clots from forming. You will need to take a blood thinner for 3 to 6 months or longer. The doctor has checked you carefully, but problems can develop later. If you notice any problems or new symptoms, get medical treatment right away. Follow-up care is a key part of your treatment and safety. Be sure to make and go to all appointments, and call your doctor if you are having problems. It's also a good idea to know your test results and keep a list of the medicines you take. How can you care for yourself at home? · Take your medicines exactly as prescribed. Call your doctor if you think you are having a problem with your medicine. · If you are taking a blood thinner, be sure you get instructions about how to take your medicine safely. Blood thinners can cause serious bleeding problems. · Wear compression stockings if your doctor recommends them. These stockings are tighter at the feet than on the legs. They may reduce pain and swelling in your legs. But there are different types of stockings, and they need to fit right. So your doctor will recommend what you need. · When you sit, use a pillow to raise the arm or leg that has the blood clot. Try to keep it above the level of your heart. When should you call for help? Call 911 anytime you think you may need emergency care. For example, call if:  · You passed out (lost consciousness). · You have symptoms of a blood clot in your lung (called a pulmonary embolism). These include:  ¨ Sudden chest pain. ¨ Trouble breathing. ¨ Coughing up blood. Call your doctor now or seek immediate medical care if:  · You have new or worse trouble breathing. · You are dizzy or lightheaded, or you feel like you may faint. · You have symptoms of a blood clot in your arm or leg. These may include:  ¨ Pain in the arm, calf, back of the knee, thigh, or groin.   ¨ Redness and swelling in the arm, leg, or groin. Watch closely for changes in your health, and be sure to contact your doctor if:  · You do not get better as expected. Where can you learn more? Go to http://chai-anahy.info/. Enter X773 in the search box to learn more about \"Deep Vein Thrombosis: Care Instructions. \"  Current as of: March 20, 2017  Content Version: 11.3  © 2107-7292 [employer]. Care instructions adapted under license by [employer] (which disclaims liability or warranty for this information). If you have questions about a medical condition or this instruction, always ask your healthcare professional. [address] any warranty or liability for your use of this information. We hope that we have addressed all of your medical concerns. The examination and treatment you received in the Emergency Department were for an emergent problem and were not intended as complete care. It is important that you follow up with your healthcare provider(s) for ongoing care. If your symptoms worsen or do not improve as expected, and you are unable to reach your usual health care provider(s), you should return to the Emergency Department. Today's healthcare is undergoing tremendous change, and patient satisfaction surveys are one of the many tools to assess the quality of medical care. You may receive a survey from the [facility] regarding your experience in the Emergency Department. I hope that your experience has been completely positive, particularly the medical care that I provided. As such, please participate in the survey; anything less than excellent does not meet my expectations or intentions. [facility] and [facility] participate in nationally recognized quality of care measures.   If your blood pressure is greater than 120/80, as reported below, we urge that you seek medical care to address the potential of high blood pressure, commonly known as hypertension. Hypertension can be hereditary or can be caused by certain medical conditions, pain, stress, or \"white coat syndrome. \"       Please make an appointment with your health care provider(s) for follow up of your Emergency Department visit. VITALS:   Patient Vitals for the past 8 hrs:   Temp Pulse Resp BP SpO2   07/26/17 1351 - - - (!) 196/108 -   07/26/17 1314 - - - (!) 215/106 -   07/26/17 1231 - - - (!) 212/118 -   07/26/17 1229 - (!) 57 18 (!) 220/120 98 %   07/26/17 0928 98.8 °F (37.1 °C) 60 16 (!) 214/118 99 %          Thank you for allowing us to provide you with medical care today. We realize that you have many choices for your emergency care needs. Please choose us in the future for any continued health care needs. [address]: [phone]            No results found for this or any previous visit (from the past 24 hour(s)). No results found.

## 2017-08-15 ENCOUNTER — OFFICE VISIT (OUTPATIENT)
Dept: INTERNAL MEDICINE CLINIC | Age: 57
End: 2017-08-15

## 2017-08-15 VITALS
RESPIRATION RATE: 16 BRPM | DIASTOLIC BLOOD PRESSURE: 100 MMHG | TEMPERATURE: 98.4 F | OXYGEN SATURATION: 98 % | BODY MASS INDEX: 27.6 KG/M2 | HEIGHT: 72 IN | HEART RATE: 83 BPM | SYSTOLIC BLOOD PRESSURE: 160 MMHG | WEIGHT: 203.8 LBS

## 2017-08-15 DIAGNOSIS — I10 WHITE COAT SYNDROME WITH DIAGNOSIS OF HYPERTENSION: ICD-10-CM

## 2017-08-15 DIAGNOSIS — M10.00 IDIOPATHIC GOUT, UNSPECIFIED CHRONICITY, UNSPECIFIED SITE: ICD-10-CM

## 2017-08-15 DIAGNOSIS — I10 ESSENTIAL HYPERTENSION: Primary | ICD-10-CM

## 2017-08-15 RX ORDER — CHLORTHALIDONE 25 MG/1
25 TABLET ORAL DAILY
Qty: 90 TAB | Refills: 1 | Status: SHIPPED | OUTPATIENT
Start: 2017-08-15 | End: 2018-01-10 | Stop reason: SDUPTHER

## 2017-08-15 NOTE — MR AVS SNAPSHOT
Visit Information Date & Time Provider Department Dept. Phone Encounter #  
 8/15/2017  8:15 AM Cecy Tuttle MD [facility] [phone] [id number] Upcoming Health Maintenance Date Due Hepatitis C Screening 1960 DTaP/Tdap/Td series (1 - Tdap) 12/22/1979 FOBT Q 1 YEAR AGE 50-75 12/22/2008 Allergies as of 8/15/2017  Review Complete On: 8/15/2017 By: Spencer Viveros Severity Noted Reaction Type Reactions Levaquin [Levofloxacin]  02/25/2015    Rash Current Immunizations  Never Reviewed No immunizations on file. Not reviewed this visit You Were Diagnosed With   
  
 Codes Comments Essential hypertension    -  Primary ICD-10-CM: I10 
ICD-9-CM: 401.9 White coat syndrome with diagnosis of hypertension     ICD-10-CM: I10 
ICD-9-CM: 401.9 Idiopathic gout, unspecified chronicity, unspecified site     ICD-10-CM: M10.00 ICD-9-CM: 274.9 Vitals BP Pulse Temp Resp Height(growth percentile) Weight(growth percentile) (!) 160/100 (BP 1 Location: Left arm, BP Patient Position: At rest) 83 98.4 °F (36.9 °C) (Oral) 16 6' (1.829 m) 203 lb 12.8 oz (92.4 kg) SpO2 BMI Smoking Status 98% 27.64 kg/m2 Never Smoker BMI and BSA Data Body Mass Index Body Surface Area  
 27.64 kg/m 2 2.17 m 2 Preferred Pharmacy Pharmacy Name Phone [id number] [place], [address] [phone] Your Updated Medication List  
  
   
This list is accurate as of: 8/15/17  8:32 AM.  Always use your most recent med list.  
  
  
  
  
 chlorthalidone 25 mg tablet Commonly known as:  Shearon Dayhoff Take 1 Tab by mouth daily. cholecalciferol 1,000 unit Cap Commonly known as:  VITAMIN D3 Take  by mouth daily. Prescriptions Sent to Pharmacy  Refills  
 chlorthalidone (HYGROTEN) 25 mg tablet 1  
 Sig: Take 1 Tab by mouth daily. Class: Normal  
 Pharmacy: [facility] [place], [address]  #: [phone] Route: Oral  
  
Introducing [facility] & [facility] SERVICES! Dear Georgette Renee: Thank you for requesting a Yunno account. Our records indicate that you already have an active Yunno account. You can access your account anytime at [email or web address] Did you know that you can access your hospital and ER discharge instructions at any time in Yunno? You can also review all of your test results from your hospital stay or ER visit. Additional Information If you have questions, please visit the Frequently Asked Questions section of the Yunno website at [email or web address] Remember, Yunno is NOT to be used for urgent needs. For medical emergencies, dial 911. Now available from your iPhone and Android! Please provide this summary of care documentation to your next provider. Your primary care clinician is listed as [phone] [address] [place] [address] [address]. If you have any questions after today's visit, please call [phone].

## 2017-08-15 NOTE — PROGRESS NOTES
1. Have you been to the ER, urgent care clinic since your last visit? Hospitalized since your last visit? No    2. Have you seen or consulted any other health care providers outside of the [facility] since your last visit? Include any pap smears or colon screening.  No   Chief Complaint   Patient presents with    Hypertension     6 months follow up     Not fasting

## 2017-08-15 NOTE — PROGRESS NOTES
HISTORY OF PRESENT ILLNESS  May Ku is a 60 y.o. male. HPI  Here for htn. He is on an ace combo. He has white coat component. He had a recent episode of gout now resolved. Past Medical History:   Diagnosis Date    BPH (benign prostatic hyperplasia)     Hypertension     Thromboembolus ([address]     2009 & 2012     Current Outpatient Prescriptions   Medication Sig    chlorthalidone (HYGROTEN) 25 mg tablet Take 1 Tab by mouth daily.  Cholecalciferol, Vitamin D3, 1,000 unit cap Take  by mouth daily. No current facility-administered medications for this visit. Review of Systems   All other systems reviewed and are negative. Visit Vitals    BP (!) 160/100 (BP 1 Location: Left arm, BP Patient Position: At rest)    Pulse 83    Temp 98.4 °F (36.9 °C) (Oral)    Resp 16    Ht 6' (1.829 m)    Wt 203 lb 12.8 oz (92.4 kg)    SpO2 98%    BMI 27.64 kg/m2     YM=414/127 his cuff    Physical Exam   Constitutional: He appears well-developed and well-nourished. Nursing note and vitals reviewed. ASSESSMENT and PLAN  Diagnoses and all orders for this visit:    1. Essential hypertension  -     chlorthalidone (HYGROTEN) 25 mg tablet; Take 1 Tab by mouth daily. Stop lisinopril-hctz.     2. White coat syndrome with diagnosis of hypertension  Get new cuff to monitor at home.,    3. Idiopathic gout, unspecified chronicity, unspecified site    Reviewed plan of care with the patient who has provided input and agrees with the goals

## 2017-09-18 ENCOUNTER — OFFICE VISIT (OUTPATIENT)
Dept: INTERNAL MEDICINE CLINIC | Age: 57
End: 2017-09-18

## 2017-09-18 VITALS
RESPIRATION RATE: 16 BRPM | SYSTOLIC BLOOD PRESSURE: 166 MMHG | TEMPERATURE: 97.9 F | OXYGEN SATURATION: 95 % | WEIGHT: 202.4 LBS | DIASTOLIC BLOOD PRESSURE: 92 MMHG | HEART RATE: 59 BPM | BODY MASS INDEX: 27.41 KG/M2 | HEIGHT: 72 IN

## 2017-09-18 DIAGNOSIS — I77.810 DILATED AORTIC ROOT (HCC): ICD-10-CM

## 2017-09-18 DIAGNOSIS — I10 WHITE COAT SYNDROME WITH DIAGNOSIS OF HYPERTENSION: ICD-10-CM

## 2017-09-18 DIAGNOSIS — I10 ESSENTIAL HYPERTENSION: Primary | ICD-10-CM

## 2017-09-18 NOTE — MR AVS SNAPSHOT
Visit Information Date & Time Provider Department Dept. Phone Encounter #  
 9/18/2017 10:00 AM Abdoulaye Dunlap MD [facility] [phone] [id number] Follow-up Instructions Return in about 6 months (around 3/18/2018). Follow-up and Disposition History Upcoming Health Maintenance Date Due Hepatitis C Screening 1960 DTaP/Tdap/Td series (1 - Tdap) 12/22/1979 FOBT Q 1 YEAR AGE 50-75 12/22/2008 Allergies as of 9/18/2017  Review Complete On: 9/18/2017 By: Meka Baeza Severity Noted Reaction Type Reactions Levaquin [Levofloxacin]  02/25/2015    Rash Current Immunizations  Never Reviewed No immunizations on file. Not reviewed this visit You Were Diagnosed With   
  
 Codes Comments Essential hypertension    -  Primary ICD-10-CM: I10 
ICD-9-CM: 401.9 White coat syndrome with diagnosis of hypertension     ICD-10-CM: I10 
ICD-9-CM: 401.9 Dilated aortic root (HCC)     ICD-10-CM: E37.564 ICD-9-CM: 447.71 Vitals BP Pulse Temp Resp Height(growth percentile) Weight(growth percentile) (!) 166/92 (BP 1 Location: Left arm, BP Patient Position: At rest) (!) 59 97.9 °F (36.6 °C) (Oral) 16 6' (1.829 m) 202 lb 6.4 oz (91.8 kg) SpO2 BMI Smoking Status 95% 27.45 kg/m2 Never Smoker BMI and BSA Data Body Mass Index Body Surface Area  
 27.45 kg/m 2 2.16 m 2 Preferred Pharmacy [facility]acy Name Phone [id number] [place], [address] [phone] Your Updated Medication List  
  
   
This list is accurate as of: 9/18/17 10:23 AM.  Always use your most recent med list.  
  
  
  
  
 chlorthalidone 25 mg tablet Commonly known as:  Gregor [place] Take 1 Tab by mouth daily. cholecalciferol 1,000 unit Cap Commonly known as:  VITAMIN D3 Take  by mouth daily. We Performed the Following RENAL FUNCTION PANEL [56970 CPT(R)] Follow-up Instructions Return in about 6 months (around 3/18/2018). Introducing [facility] & [facility] SERVICES! Dear Kwan Mattson: Thank you for requesting a [employer] account. Our records indicate that you already have an active [employer] account. You can access your account anytime at [email or web address] Did you know that you can access your hospital and ER discharge instructions at any time in [employer] Health? You can also review all of your test results from your hospital stay or ER visit. Additional Information If you have questions, please visit the Frequently Asked Questions section of the [employer] Health website at [email or web address] Remember, [employer] is NOT to be used for urgent needs. For medical emergencies, dial 911. Now available from your iPhone and Android! Please provide this summary of care documentation to your next provider. Your primary care clinician is listed as [phone] [address] [place] [address] [address]. If you have any questions after today's visit, please call [phone].

## 2017-09-18 NOTE — PROGRESS NOTES
HISTORY OF PRESENT ILLNESS  Marquis Best is a 60 y.o. male. HPI  Here for HTN. He is doing well with his new med and readings are 130-140/80 at home. He had a recent ECHO. Past Medical History:   Diagnosis Date    BPH (benign prostatic hyperplasia)     Hypertension     Thromboemb[address] ([facility] [address]     2009 & 2012     Current Outpatient Prescriptions   Medication Sig    chlorthalidone (HYGROTEN) 25 mg tablet Take 1 Tab by mouth daily.  Cholecalciferol, Vitamin D3, 1,000 unit cap Take  by mouth daily. No current facility-administered medications for this visit. Review of Systems   All other systems reviewed and are negative. Visit Vitals    BP (!) 166/92 (BP 1 Location: Left arm, BP Patient Position: At rest)    Pulse (!) 59    Temp 97.9 °F (36.6 °C) (Oral)    Resp 16    Ht 6' (1.829 m)    Wt 202 lb 6.4 oz (91.8 kg)    SpO2 95%    BMI 27.45 kg/m2     170/85 my cuff 190/123 his cuff    Physical Exam   Constitutional: He appears well-developed and well-nourished. Nursing note and vitals reviewed. ASSESSMENT and PLAN  Diagnoses and all orders for this visit:    1. Essential hypertension  -     RENAL FUNCTION PANEL  The current medical regimen is effective;  continue present plan and medications. 2. White coat syndrome with diagnosis of hypertension    3. Dilated aortic root ([facility] [address]  Annual ECHO needed.       Reviewed plan of care with the patient who has provided input and agrees with the goals

## 2017-09-18 NOTE — PROGRESS NOTES
1. Have you been to the ER, urgent care clinic since your last visit? Hospitalized since your last visit? No    2. Have you seen or consulted any other health care providers outside of the [facility] since your last visit? Include any pap smears or colon screening.  No   Chief Complaint   Patient presents with    Hypertension     1 month follow up     Not fasting

## 2017-09-19 LAB
ALBUMIN SERPL-MCNC: 4.3 G/DL (ref 3.5–5.5)
BUN SERPL-MCNC: 13 MG/DL (ref 6–24)
BUN/CREAT SERPL: 12 (ref 9–20)
CALCIUM SERPL-MCNC: 10 MG/DL (ref 8.7–10.2)
CHLORIDE SERPL-SCNC: 96 MMOL/L (ref 96–106)
CO2 SERPL-SCNC: 29 MMOL/L (ref 18–29)
CREAT SERPL-MCNC: 1.12 MG/DL (ref 0.76–1.27)
GLUCOSE SERPL-MCNC: 93 MG/DL (ref 65–99)
PHOSPHATE SERPL-MCNC: 3.2 MG/DL (ref 2.5–4.5)
POTASSIUM SERPL-SCNC: 4.1 MMOL/L (ref 3.5–5.2)
SODIUM SERPL-SCNC: 139 MMOL/L (ref 134–144)

## 2018-01-10 DIAGNOSIS — I10 ESSENTIAL HYPERTENSION: ICD-10-CM

## 2018-01-10 RX ORDER — CHLORTHALIDONE 25 MG/1
25 TABLET ORAL DAILY
Qty: 90 TAB | Refills: 1 | Status: SHIPPED | OUTPATIENT
Start: 2018-01-10 | End: 2018-06-07 | Stop reason: ALTCHOICE

## 2018-03-16 ENCOUNTER — OFFICE VISIT (OUTPATIENT)
Dept: INTERNAL MEDICINE CLINIC | Age: 58
End: 2018-03-16

## 2018-03-16 VITALS
OXYGEN SATURATION: 98 % | WEIGHT: 203.4 LBS | HEART RATE: 62 BPM | TEMPERATURE: 98 F | BODY MASS INDEX: 27.55 KG/M2 | DIASTOLIC BLOOD PRESSURE: 88 MMHG | RESPIRATION RATE: 18 BRPM | SYSTOLIC BLOOD PRESSURE: 134 MMHG | HEIGHT: 72 IN

## 2018-03-16 DIAGNOSIS — I10 ESSENTIAL HYPERTENSION: Primary | ICD-10-CM

## 2018-03-16 NOTE — MR AVS SNAPSHOT
[address] [place] [address] [address] [place] [address] 
[phone] Patient: Miladis Ivey MRN: [id number] VDN:[id number]/40/4298 Visit Information Date & Time Provider Department Dept. Phone Encounter #  
 3/16/2018 11:30 AM Susannah Bailey MD [facility] [phone] [id number] Follow-up Instructions Return if symptoms worsen or fail to improve. Upcoming Health Maintenance Date Due Hepatitis C Screening 1960 DTaP/Tdap/Td series (1 - Tdap) 12/22/1979 FOBT Q 1 YEAR AGE 50-75 12/22/2008 Allergies as of 3/16/2018  Review Complete On: 3/16/2018 By: Susannah Bailey MD  
  
 Severity Noted Reaction Type Reactions Levaquin [Levofloxacin]  02/25/2015    Rash Current Immunizations  Never Reviewed No immunizations on file. Not reviewed this visit You Were Diagnosed With   
  
 Codes Comments Essential hypertension    -  Primary ICD-10-CM: I10 
ICD-9-CM: 401.9 Vitals BP Pulse Temp Resp Height(growth percentile) Weight(growth percentile) 134/88 (BP 1 Location: Left arm, BP Patient Position: At rest) 62 98 °F (36.7 °C) (Oral) 18 6' (1.829 m) 203 lb 6.4 oz (92.3 kg) SpO2 BMI Smoking Status 98% 27.59 kg/m2 Never Smoker BMI and BSA Data Body Mass Index Body Surface Area  
 27.59 kg/m 2 2.17 m 2 Preferred Pharmacy Pharmacy Name Phone [id number] [place], [address] [phone] Your Updated Medication List  
  
   
This list is accurate as of 3/16/18 11:54 AM.  Always use your most recent med list.  
  
  
  
  
 chlorthalidone 25 mg tablet Commonly known as:  [place] Take 1 Tab by mouth daily. cholecalciferol 1,000 unit Cap Commonly known as:  VITAMIN D3 Take  by mouth daily. Follow-up Instructions Return if symptoms worsen or fail to improve. Introducing [facility] & HEALTH SERVICES! Dear Katherine Ayers: Thank you for requesting a [employer] cell Storage account. Our records indicate that you already have an active [employer] cell [employer] account. You can access your account anytime at [email or web address] Did you know that you can access your hospital and ER discharge instructions at any time in Blood cell Storage? You can also review all of your test results from your hospital stay or ER visit. Additional Information If you have questions, please visit the Frequently Asked Questions section of the [employer] cell Storage website at [email or web address] Remember, [employer] cell Storage is NOT to be used for urgent needs. For medical emergencies, dial 911. Now available from your iPhone and Android! Please provide this summary of care documentation to your next provider. Your primary care clinician is listed as [id number] [address] [place] [address] [address]. If you have any questions after today's visit, please call [phone].

## 2018-03-16 NOTE — PROGRESS NOTES
1. Have you been to the ER, urgent care clinic since your last visit? Hospitalized since your last visit?no    2. Have you seen or consulted any other health care providers outside of the [address] [place] [address] since your last visit? Include any pap smears or colon screening. No    Chief Complaint   Patient presents with    Hypertension     6 months follow up     Not fasting      Chief Complaint   Patient presents with    Hypertension     6 months follow up     He is a 59 y.o. male who presents for evalution. Reviewed PmHx, RxHx, FmHx, SocHx, AllgHx and updated and dated in the chart. Patient Active Problem List    Diagnosis    Dilated aortic root ([facility]    Essential hypertension    White coat syndrome with diagnosis of hypertension    Idiopathic gout    Palpitations    ED (erectile dysfunction)    BPH (benign prostatic hyperplasia)     2015 biopsy negative.  Thromboembolus [facility])     2009 & 2012. 2014 elevated D dimer. Review of Systems - negative except as listed above in the HPI    Objective:     Vitals:    03/16/18 1150   BP: 134/88   Pulse: 62   Resp: 18   Temp: 98 °F (36.7 °C)   TempSrc: Oral   SpO2: 98%   Weight: 203 lb 6.4 oz (92.3 kg)   Height: 6' (1.829 m)     Physical Examination: General appearance - alert, well appearing, and in no distress  Chest - clear to auscultation, no wheezes, rales or rhonchi, symmetric air entry  Heart - normal rate, regular rhythm, normal S1, S2, no murmurs, rubs, clicks or gallops    Assessment/ Plan:   Diagnoses and all orders for this visit:    1. Essential hypertension  -     LIPID PANEL  -     METABOLIC PANEL, COMPREHENSIVE  -at goal       Follow-up Disposition:  Return if symptoms worsen or fail to improve. I have discussed the diagnosis with the patient and the intended plan as seen in the above orders. The patient understands and agrees with the plan.  The patient has received an after-visit summary and questions were answered concerning future plans. Medication Side Effects and Warnings were discussed with patient  Patient Labs were reviewed and or requested:  Patient Past Records were reviewed and or requested    Erika Ragland M.D. There are no Patient Instructions on file for this visit.

## 2018-03-17 LAB
ALBUMIN SERPL-MCNC: 4 G/DL (ref 3.5–5.5)
ALBUMIN/GLOB SERPL: 1.1 {RATIO} (ref 1.2–2.2)
ALP SERPL-CCNC: 69 IU/L (ref 39–117)
ALT SERPL-CCNC: 19 IU/L (ref 0–44)
AST SERPL-CCNC: 26 IU/L (ref 0–40)
BILIRUB SERPL-MCNC: 0.8 MG/DL (ref 0–1.2)
BUN SERPL-MCNC: 16 MG/DL (ref 6–24)
BUN/CREAT SERPL: 16 (ref 9–20)
CALCIUM SERPL-MCNC: 9.9 MG/DL (ref 8.7–10.2)
CHLORIDE SERPL-SCNC: 95 MMOL/L (ref 96–106)
CHOLEST SERPL-MCNC: 194 MG/DL (ref 100–199)
CO2 SERPL-SCNC: 30 MMOL/L (ref 18–29)
CREAT SERPL-MCNC: 1.03 MG/DL (ref 0.76–1.27)
GFR SERPLBLD CREATININE-BSD FMLA CKD-EPI: 79 ML/MIN/1.73
GFR SERPLBLD CREATININE-BSD FMLA CKD-EPI: 91 ML/MIN/1.73
GLOBULIN SER CALC-MCNC: 3.5 G/DL (ref 1.5–4.5)
GLUCOSE SERPL-MCNC: 82 MG/DL (ref 65–99)
HDLC SERPL-MCNC: 50 MG/DL
INTERPRETATION, 910389: NORMAL
LDLC SERPL CALC-MCNC: 123 MG/DL (ref 0–99)
POTASSIUM SERPL-SCNC: 3.9 MMOL/L (ref 3.5–5.2)
PROT SERPL-MCNC: 7.5 G/DL (ref 6–8.5)
SODIUM SERPL-SCNC: 141 MMOL/L (ref 134–144)
TRIGL SERPL-MCNC: 104 MG/DL (ref 0–149)
VLDLC SERPL CALC-MCNC: 21 MG/DL (ref 5–40)

## 2018-05-22 ENCOUNTER — HOSPITAL ENCOUNTER (INPATIENT)
Age: 58
LOS: 2 days | Discharge: HOME | DRG: 299 | End: 2018-05-24
Attending: EMERGENCY MEDICINE | Admitting: INTERNAL MEDICINE
Payer: COMMERCIAL

## 2018-05-22 ENCOUNTER — APPOINTMENT (OUTPATIENT)
Dept: GENERAL RADIOLOGY | Age: 58
DRG: 299 | End: 2018-05-22
Attending: EMERGENCY MEDICINE
Payer: COMMERCIAL

## 2018-05-22 ENCOUNTER — APPOINTMENT (OUTPATIENT)
Dept: CT IMAGING | Age: 58
DRG: 299 | End: 2018-05-22
Attending: EMERGENCY MEDICINE
Payer: COMMERCIAL

## 2018-05-22 DIAGNOSIS — I82.433 ACUTE DEEP VEIN THROMBOSIS (DVT) OF POPLITEAL VEIN OF BOTH LOWER EXTREMITIES (HCC): Primary | ICD-10-CM

## 2018-05-22 DIAGNOSIS — I26.99 BILATERAL PULMONARY EMBOLISM (HCC): ICD-10-CM

## 2018-05-22 PROBLEM — R55 NEAR SYNCOPE: Status: ACTIVE | Noted: 2018-05-22

## 2018-05-22 PROBLEM — R94.31 ABNORMAL ECG: Status: ACTIVE | Noted: 2018-05-22

## 2018-05-22 PROBLEM — I82.403 DVT, BILATERAL LOWER LIMBS (HCC): Status: ACTIVE | Noted: 2018-05-22

## 2018-05-22 PROBLEM — R79.1 ELEVATED FACTOR VIII LEVEL: Status: ACTIVE | Noted: 2018-05-22

## 2018-05-22 LAB
ALBUMIN SERPL-MCNC: 3.4 G/DL (ref 3.5–5)
ALBUMIN/GLOB SERPL: 0.7 {RATIO} (ref 1.1–2.2)
ALP SERPL-CCNC: 75 U/L (ref 45–117)
ALT SERPL-CCNC: 27 U/L (ref 12–78)
ANION GAP SERPL CALC-SCNC: 5 MMOL/L (ref 5–15)
AST SERPL-CCNC: 28 U/L (ref 15–37)
ATRIAL RATE: 75 BPM
BASOPHILS # BLD: 0 K/UL (ref 0–0.1)
BASOPHILS NFR BLD: 1 % (ref 0–1)
BILIRUB SERPL-MCNC: 0.9 MG/DL (ref 0.2–1)
BNP SERPL-MCNC: 388 PG/ML (ref 0–125)
BUN SERPL-MCNC: 16 MG/DL (ref 6–20)
BUN/CREAT SERPL: 13 (ref 12–20)
CALCIUM SERPL-MCNC: 9.2 MG/DL (ref 8.5–10.1)
CALCULATED R AXIS, ECG10: -55 DEGREES
CALCULATED T AXIS, ECG11: 56 DEGREES
CHLORIDE SERPL-SCNC: 102 MMOL/L (ref 97–108)
CO2 SERPL-SCNC: 32 MMOL/L (ref 21–32)
CREAT SERPL-MCNC: 1.25 MG/DL (ref 0.7–1.3)
DIAGNOSIS, 93000: NORMAL
DIFFERENTIAL METHOD BLD: ABNORMAL
EOSINOPHIL # BLD: 0.1 K/UL (ref 0–0.4)
EOSINOPHIL NFR BLD: 1 % (ref 0–7)
ERYTHROCYTE [DISTWIDTH] IN BLOOD BY AUTOMATED COUNT: 14.8 % (ref 11.5–14.5)
GLOBULIN SER CALC-MCNC: 4.8 G/DL (ref 2–4)
GLUCOSE SERPL-MCNC: 83 MG/DL (ref 65–100)
HCT VFR BLD AUTO: 42.3 % (ref 36.6–50.3)
HGB BLD-MCNC: 13.4 G/DL (ref 12.1–17)
IMM GRANULOCYTES # BLD: 0 K/UL (ref 0–0.04)
IMM GRANULOCYTES NFR BLD AUTO: 0 % (ref 0–0.5)
LYMPHOCYTES # BLD: 1.9 K/UL (ref 0.8–3.5)
LYMPHOCYTES NFR BLD: 30 % (ref 12–49)
MCH RBC QN AUTO: 26.1 PG (ref 26–34)
MCHC RBC AUTO-ENTMCNC: 31.7 G/DL (ref 30–36.5)
MCV RBC AUTO: 82.5 FL (ref 80–99)
MONOCYTES # BLD: 1 K/UL (ref 0–1)
MONOCYTES NFR BLD: 15 % (ref 5–13)
NEUTS SEG # BLD: 3.5 K/UL (ref 1.8–8)
NEUTS SEG NFR BLD: 54 % (ref 32–75)
NRBC # BLD: 0 K/UL (ref 0–0.01)
NRBC BLD-RTO: 0 PER 100 WBC
PLATELET # BLD AUTO: 232 K/UL (ref 150–400)
PMV BLD AUTO: 8.9 FL (ref 8.9–12.9)
POTASSIUM SERPL-SCNC: 3.3 MMOL/L (ref 3.5–5.1)
PROT SERPL-MCNC: 8.2 G/DL (ref 6.4–8.2)
Q-T INTERVAL, ECG07: 360 MS
QRS DURATION, ECG06: 96 MS
QTC CALCULATION (BEZET), ECG08: 396 MS
RBC # BLD AUTO: 5.13 M/UL (ref 4.1–5.7)
SODIUM SERPL-SCNC: 139 MMOL/L (ref 136–145)
TROPONIN I SERPL-MCNC: <0.04 NG/ML
VENTRICULAR RATE, ECG03: 73 BPM
WBC # BLD AUTO: 6.4 K/UL (ref 4.1–11.1)

## 2018-05-22 PROCEDURE — 84484 ASSAY OF TROPONIN QUANT: CPT | Performed by: EMERGENCY MEDICINE

## 2018-05-22 PROCEDURE — 83880 ASSAY OF NATRIURETIC PEPTIDE: CPT | Performed by: EMERGENCY MEDICINE

## 2018-05-22 PROCEDURE — 74011250636 HC RX REV CODE- 250/636: Performed by: INTERNAL MEDICINE

## 2018-05-22 PROCEDURE — 85025 COMPLETE CBC W/AUTO DIFF WBC: CPT | Performed by: EMERGENCY MEDICINE

## 2018-05-22 PROCEDURE — 74011000258 HC RX REV CODE- 258: Performed by: EMERGENCY MEDICINE

## 2018-05-22 PROCEDURE — 80053 COMPREHEN METABOLIC PANEL: CPT | Performed by: EMERGENCY MEDICINE

## 2018-05-22 PROCEDURE — 93005 ELECTROCARDIOGRAM TRACING: CPT

## 2018-05-22 PROCEDURE — 71046 X-RAY EXAM CHEST 2 VIEWS: CPT

## 2018-05-22 PROCEDURE — 74011250637 HC RX REV CODE- 250/637: Performed by: INTERNAL MEDICINE

## 2018-05-22 PROCEDURE — 93970 EXTREMITY STUDY: CPT

## 2018-05-22 PROCEDURE — 36415 COLL VENOUS BLD VENIPUNCTURE: CPT | Performed by: EMERGENCY MEDICINE

## 2018-05-22 PROCEDURE — 74011250636 HC RX REV CODE- 250/636: Performed by: EMERGENCY MEDICINE

## 2018-05-22 PROCEDURE — 65660000000 HC RM CCU STEPDOWN

## 2018-05-22 PROCEDURE — 71275 CT ANGIOGRAPHY CHEST: CPT

## 2018-05-22 PROCEDURE — 74011636320 HC RX REV CODE- 636/320: Performed by: EMERGENCY MEDICINE

## 2018-05-22 PROCEDURE — 99284 EMERGENCY DEPT VISIT MOD MDM: CPT

## 2018-05-22 RX ORDER — SODIUM CHLORIDE 0.9 % (FLUSH) 0.9 %
5-10 SYRINGE (ML) INJECTION AS NEEDED
Status: DISCONTINUED | OUTPATIENT
Start: 2018-05-22 | End: 2018-05-24 | Stop reason: HOSPADM

## 2018-05-22 RX ORDER — SODIUM CHLORIDE 0.9 % (FLUSH) 0.9 %
10 SYRINGE (ML) INJECTION
Status: COMPLETED | OUTPATIENT
Start: 2018-05-22 | End: 2018-05-22

## 2018-05-22 RX ORDER — INDOMETHACIN 50 MG/1
50 CAPSULE ORAL 2 TIMES DAILY
Status: ON HOLD | COMMUNITY
Start: 2018-05-18 | End: 2018-05-22 | Stop reason: SINTOL

## 2018-05-22 RX ORDER — ONDANSETRON 2 MG/ML
4 INJECTION INTRAMUSCULAR; INTRAVENOUS
Status: DISCONTINUED | OUTPATIENT
Start: 2018-05-22 | End: 2018-05-24 | Stop reason: HOSPADM

## 2018-05-22 RX ORDER — ENOXAPARIN SODIUM 100 MG/ML
1 INJECTION SUBCUTANEOUS
Status: COMPLETED | OUTPATIENT
Start: 2018-05-22 | End: 2018-05-22

## 2018-05-22 RX ORDER — CHLORTHALIDONE 25 MG/1
25 TABLET ORAL DAILY
Status: DISCONTINUED | OUTPATIENT
Start: 2018-05-22 | End: 2018-05-24 | Stop reason: HOSPADM

## 2018-05-22 RX ORDER — SODIUM CHLORIDE 0.9 % (FLUSH) 0.9 %
5-10 SYRINGE (ML) INJECTION EVERY 8 HOURS
Status: DISCONTINUED | OUTPATIENT
Start: 2018-05-22 | End: 2018-05-24 | Stop reason: HOSPADM

## 2018-05-22 RX ORDER — MELATONIN
1000 DAILY
Status: DISCONTINUED | OUTPATIENT
Start: 2018-05-22 | End: 2018-05-24 | Stop reason: HOSPADM

## 2018-05-22 RX ORDER — HYDRALAZINE HYDROCHLORIDE 20 MG/ML
10 INJECTION INTRAMUSCULAR; INTRAVENOUS
Status: DISCONTINUED | OUTPATIENT
Start: 2018-05-22 | End: 2018-05-24 | Stop reason: HOSPADM

## 2018-05-22 RX ORDER — ACETAMINOPHEN 325 MG/1
650 TABLET ORAL
Status: DISCONTINUED | OUTPATIENT
Start: 2018-05-22 | End: 2018-05-24 | Stop reason: HOSPADM

## 2018-05-22 RX ADMIN — HYDRALAZINE HYDROCHLORIDE 10 MG: 20 INJECTION INTRAMUSCULAR; INTRAVENOUS at 14:23

## 2018-05-22 RX ADMIN — Medication 10 ML: at 22:00

## 2018-05-22 RX ADMIN — Medication 10 ML: at 12:29

## 2018-05-22 RX ADMIN — APIXABAN 10 MG: 5 TABLET, FILM COATED ORAL at 18:37

## 2018-05-22 RX ADMIN — IOPAMIDOL 80 ML: 755 INJECTION, SOLUTION INTRAVENOUS at 12:29

## 2018-05-22 RX ADMIN — Medication 10 ML: at 16:29

## 2018-05-22 RX ADMIN — SODIUM CHLORIDE 100 ML: 900 INJECTION, SOLUTION INTRAVENOUS at 12:29

## 2018-05-22 RX ADMIN — ENOXAPARIN SODIUM 100 MG: 100 INJECTION SUBCUTANEOUS at 13:04

## 2018-05-22 NOTE — IP AVS SNAPSHOT
[address] [facility] [address] [place] [address] 
[phone] Patient: Johnson Molina MRN: [id number] WTF:58/32/[id number] About your hospitalization You were admitted on:  May 22, 2018 You last received care in the:  [place] [address] You were discharged on:  May 24, 2018 Why you were hospitalized Your primary diagnosis was:  Bilateral Pulmonary Embolism (Hcc) Your diagnoses also included:  Dvt, Bilateral Lower Limbs (Hcc), Abnormal Ecg, Near Syncope, Hypertension, Uncontrolled, Idiopathic Gout, Elevated Factor Viii Level Follow-up Information None Discharge Orders None A check dorothy indicates which time of day the medication should be taken. My Medications START taking these medications Instructions Each Dose to Equal  
 Morning Noon Evening Bedtime * apixaban 5 mg tablet Commonly known as:  Judie Castañeda Your last dose was: Your next dose is: Take 2 Tabs by mouth two (2) times a day. 10 mg  
    
   
   
   
  
 * apixaban 5 mg tablet Commonly known as:  Judie Seed Start taking on:  5/29/2018 Your last dose was: Your next dose is: Take 1 Tab by mouth two (2) times a day. 5 mg * Notice: This list has 2 medication(s) that are the same as other medications prescribed for you. Read the directions carefully, and ask your doctor or other care provider to review them with you. CONTINUE taking these medications Instructions Each Dose to Equal  
 Morning Noon Evening Bedtime  
 chlorthalidone 25 mg tablet Commonly known as:  Wendi Mooring Your last dose was: Your next dose is: Take 1 Tab by mouth daily. 25 mg  
    
   
   
   
  
 cholecalciferol 1,000 unit Cap Commonly known as:  VITAMIN D3 Your last dose was: Your next dose is: Take 1,000 Units by mouth daily. 1000 Units ASK your doctor about these medications Instructions Each Dose to Equal  
 Morning Noon Evening Bedtime  
 indomethacin 50 mg capsule Commonly known as:  INDOCIN Your last dose was: Your next dose is: Take 50 mg by mouth two (2) times a day. 50 mg Where to Get Your Medications Information on where to get these meds will be given to you by the nurse or doctor. ! Ask your nurse or doctor about these medications  
  apixaban 5 mg tablet  
 apixaban 5 mg tablet Discharge Instructions Patient Discharge Instructions Anna Dubonsatish / [id number] : 1960 Admitted 2018 Discharged: 2018 9:49 AM  
 
ACUTE DIAGNOSES: 
DVT, bilateral lower limbs ([facility] CHRONIC MEDICAL DIAGNOSES: 
Problem List as of 2018  Date Reviewed: 2018 Codes Class Noted - Resolved DVT, bilateral lower limbs (HCC) ICD-10-CM: I82.403 ICD-9-CM: 453.40  2018 - Present Abnormal ECG ICD-10-CM: R94.31 
ICD-9-CM: 794.31  2018 - Present Near syncope ICD-10-CM: R55 
ICD-9-CM: 780.2  2018 - Present * (Principal)Bilateral pulmonary embolism ([facility] ICD-10-CM: I26.99 
ICD-9-CM: 415.19  2018 - Present Elevated factor VIII level ICD-10-CM: R79.1 ICD-9-CM: 790.92  2018 - Present Dilated aortic root (HCC) ICD-10-CM: X01.656 ICD-9-CM: 447.71  2017 - Present Hypertension, uncontrolled ICD-10-CM: I10 
ICD-9-CM: 401.9  8/15/2017 - Present White coat syndrome with diagnosis of hypertension ICD-10-CM: I10 
ICD-9-CM: 401.9  8/15/2017 - Present Idiopathic gout ICD-10-CM: M10.00 ICD-9-CM: 274.9  8/15/2017 - Present Palpitations ICD-10-CM: R00.2 ICD-9-CM: 785.1  2017 - Present ED (erectile dysfunction) ICD-10-CM: N52.9 ICD-9-CM: 607.84  2013 - Present Throm[facility]embolus ([facility] ICD-10-CM: I74.9 ICD-9-CM: 444.9  Unknown - Present Overview Addendum 4/22/2014  6:31 AM by Paulina Alcaraz 2009 & 2012. 2014 elevated D dimer. BPH (benign prostatic hyperplasia) ICD-10-CM: N40.0 ICD-9-CM: 600.00  10/11/2013 - Present Overview Signed 8/15/2017  8:17 AM by Bernice Lawrence 2015 biopsy negative. RESOLVED: White coat hypertension ICD-10-CM: Bonilla Dimes ICD-9-CM: Bonilla Dimes  1/20/2016 - 3/16/2018 RESOLVED: Hypertension ICD-10-CM: I10 
ICD-9-CM: 401.9  Unknown - 8/15/2017 DISCHARGE MEDICATIONS:  
 
 
 
 
· It is important that you take the medication exactly as they are prescribed. · Keep your medication in the bottles provided by the pharmacist and keep a list of the medication names, dosages, and times to be taken in your wallet. · Do not take other medications without consulting your doctor. DIET:  Regular Diet ACTIVITY: Activity as tolerated ADDITIONAL INFORMATION: If you experience any of the following symptoms then please call your primary care physician or return to the emergency room if you cannot get hold of your doctor: Fever, chills, nausea, vomiting, diarrhea, change in mentation, falling, bleeding, shortness of breath. FOLLOW UP CARE: 
Dr. Ritter primary care provider on file. Lul Salmeron Please call and set up an appointment to see them in 2 weeks. Call pcp  at the number below and set up an appointment to see them in 1 week: Information obtained by : 
I understand that if any problems occur once I am at home I am to contact my physician. I understand and acknowledge receipt of the instructions indicated above. Physician's or R.N.'s Signature                                                                  Date/Time Patient or Representative Signature                                                          Date/Time MyChart Activation Thank you for requesting access to [employer]. Please follow the instructions below to securely access and download your online medical record. C[employer]ic allows you to send messages to your doctor, view your test results, renew your prescriptions, schedule appointments, and more. How Do I Sign Up? 1. In your internet browser, go to [email or web address]. 2. Click on the First Time User? Click Here link in the Sign In box. You will see the New Member Sign Up page. 3. Enter your Pharmapodt Access Code exactly as it appears below. You will not need to use this code after youve completed the sign-up process. If you do not sign up before the expiration date, you must request a new code. Pharmap[employer]t Access Code: Activation code not generated Current Cenzic Status: Active (This is the date your Pharmapodt access code will ) 4. Enter the last four digits of your Social Security Number (xxxx) and Date of Birth (mm/dd/yyyy) as indicated and click Submit. You will be taken to the next sign-up page. 5. Create a Cenzic ID. This will be your Cenzic login ID and cannot be changed, so think of one that is secure and easy to remember. 6. Create a Cenzic password. You can change your password at any time. 7. Enter your Password Reset Question and Answer. This can be used at a later time if you forget your password. 8. Enter your e-mail address. You will receive e-mail notification when new information is available in [address]. 9. Click Sign Up. You can now view and download portions of your medical record. 10. Click the Download Summary menu link to download a portable copy of your medical information. Additional Information If you have questions, please visit the Frequently Asked Questions section of the xTurion website at [email or web address] Remember, xTurion is NOT to be used for urgent needs. For medical emergencies, dial 911. Introducing [facility] & HEALTH SERVICES! Dear Renee Vazquez: Thank you for requesting a xTurion account. Our records indicate that you already have an active xTurion account. You can access your account anytime at [email or web address] Did you know that you can access your hospital and ER discharge instructions at any time in xTurion? You can also review all of your test results from your hospital stay or ER visit. Additional Information If you have questions, please visit the Frequently Asked Questions section of the xTurion website at [email or web address] Remember, xTurion is NOT to be used for urgent needs. For medical emergencies, dial 911. Now available from your iPhone and Android! Introducing Colin Anderson As a [facility] patient, I wanted to make you aware of our electronic visit tool called Colin Mickjenniferfin. [facility] 24/7 allows you to connect within minutes with a medical provider 24 hours a day, seven days a week via a mobile device or tablet or logging into a secure website from your computer. You can access Colin Anderson from anywhere in the [place]. A virtual visit might be right for you when you have a simple condition and feel like you just dont want to get out of bed, or cant get away from work for an appointment, when your regular [facility] provider is not available (evenings, weekends or holidays), or when youre out of town and need minor care. Electronic visits cost only $49 and if the [facility] 24/7 provider determines a prescription is needed to treat your condition, one can be electronically transmitted to a nearby pharmacy*. Please take a moment to enroll today if you have not already done so.   The enrollment process is free and takes just a few minutes. To enroll, please download the [employer] 24/7 irma to your tablet or phone, or visit [email or web address] to enroll on your computer. And, as an [address] [place] [address] patient with a [employer] account, the results of your visits will be scanned into your electronic medical record and your primary care provider will be able to view the scanned results. We urge you to continue to see your regular [employer] provider for your ongoing medical care. And while your primary care provider may not be the one available when you seek a [facility] virtual visit, the peace of mind you get from getting a real diagnosis real time can be priceless. For more information on [employer], view our Frequently Asked Questions (FAQs) at [email or web address]. Sincerely, 
 
Rob Rico MD 
Chief Medical Officer [employer] *:  certain medications cannot be prescribed via [facility]ach[facility] Providers Seen During Your Hospitalization Provider Specialty Primary office phone Monisha Bruce, [address] [place] [address] Emergency Medicine [phone] Greg Couch MD Internal Medicine [phone] Shahriar Peters MD Hospitalist [phone] Neo Anaya MD Internal Medicine [phone] Your Primary Care Physician (PCP) ** None ** You are allergic to the following Allergen Reactions Levaquin (Levofloxacin) Rash Recent Documentation Height Weight BMI Smoking Status 1.829 m 91.3 kg 27.3 kg/m2 Never Smoker Emergency Contacts Name Discharge Info Relation Home Work Mobile Odessa Byrd DISCHARGE CAREGIVER [3] Spouse [3] [phone] Patient Belongings The following personal items are in your possession at time of discharge: 
     Visual Aid: Glasses Please provide this summary of care documentation to your next provider. Signatures-by signing, you are acknowledging that this After Visit Summary has been reviewed with you and you have received a copy. Patient Signature:  ____________________________________________________________ Date:  ____________________________________________________________  
  
Claudene Born Provider Signature:  ____________________________________________________________ Date:  ____________________________________________________________

## 2018-05-22 NOTE — H&P
History & Physical  Casie Correia MD, S    Date of admission: 5/22/2018    Patient name: Zohra Pardo  MRN: [id number]  YOB: 1960  Age: 59 y.o. Primary care provider:  Koki No PA-C     Source of Information: patient, medical records                                Chief complaint: SOB    History of present illness  Zohra Pardo is a 59 y.o. male with h/o RLE DVT, BPH, gout, and HTN who presents to the ED from work with SOB with exertion. Patient is a  and noted that he has been getting SOB when walking up stairs and walking back to his desk at his workplace. Symptoms started about 1 week ago. He reports some mild chest pressure and lightheadedness but no leg swelling, palpitations, cough, HA, dizziness, fainting spells, f/c/n/v, recent weight changes, abdominal pains. He has a h/o RLE DVT in 2009 and 2012 and was on warfarin x6 months each time per the chart. (Patient and his wife report he had only 1 DVT in 2009 and was on warfarin x1 month.) He was seen by Hematologist Dr Elias Bah in 2014 who found he had a mildly elevated Factor VIII level, but patient reportedly did not want to be on prolonged anticoagulation at that time. Patient was taking ASA which he stopped when he started indomethacin for a gout flare. Patient reports he has been working long hours at work, not getting up that much from his desk, and has not exercised as much as he used to. ED triage VS were temp 98.6F, HR 76, /97, RR 16, SpO2 99% on RA. In the ED, he was sitting up on the stretcher when he felt lightheadedness and had a witnessed near syncopal event. In the ED, he received enoxaparin 100mg SC x1.       Past Medical History:   Diagnosis Date    BPH (benign prostatic hyperplasia)     Gout     Hypertension     Thromboembolus [facility])     2009 & 2012      Past Surgical History:   Procedure Laterality Date    HX COLONOSCOPY  2012 Prior to Admission medications    Medication Sig Start Date End Date Taking? Authorizing Provider   chlorthalidone (HYGROTEN) 25 mg tablet Take 1 Tab by mouth daily. 1/10/18  Yes Ghanshyam Roberts MD   Cholecalciferol, Vitamin D3, 1,000 unit cap Take 1,000 Units by mouth daily. Yes Historical Provider   indomethacin (INDOCIN) 50 mg capsule Take 50 mg by mouth two (2) times a day. 5/18/18   Historical Provider     Allergies   Allergen Reactions    Levaquin [Levofloxacin] Rash      Family History   Problem Relation Age of Onset    Cancer Mother      multiple myeloma    Diabetes Mother     Hypertension Mother     Stroke Mother     Cancer Father      pancreatic    Heart Disease Father     Hypertension Father     Denies FHx of clotting, blood disorders. Social history  Patient resides  x  Independently      With family care      Assisted living      SNF    Ambulates  x  Independently      With cane       Assisted walker         Alcohol history   x  None     Social     Chronic   Smoking history  x  None     Former smoker     Current smoker     Denies illicit drug use. Works as a .    History   Smoking Status    Never Smoker   Smokeless Tobacco    Never Used       Code status  x  Full code     DNR/DNI        Code status Full by default. Review of systems  A comprehensive review of systems was negative except for that written in the History of Present Illness.      Physical Examination   Visit Vitals    BP (!) 184/93 (BP 1 Location: Left arm, BP Patient Position: At rest)    Pulse (!) 59    Temp 98.6 °F (37 °C)    Resp 22    Ht 6' (1.829 m)    Wt 96.2 kg (212 lb 2 oz)    SpO2 98%    BMI 28.77 kg/m2          O2 Device: Room air    Gen: awake, NAD, cooperative, pleasant  Head: NCAT  EENT: PERRL, EOMI, MMM, oropharynx benign  Neck: supple, no masses  Lungs: CTAB, no w/r/r  CVS: regular rhythm, normal rate, S1S2, no m/r/g appreciated, trace b/l LE edema, +pulses  Abd: +BS, soft, NT, ND  MSK: moves all extremities  Neuro: AOx3, CN II-XII grossly intact, NFD, responds appropriately to questions and commands  Skin: warm, dry; no rashes, lesions, ulcers noted    Data Review    EKG: possible junctional rhythm rate 73    24 Hour Results:  Recent Results (from the past 24 hour(s))   EKG, 12 LEAD, INITIAL    Collection Time: 05/22/18  8:06 AM   Result Value Ref Range    Ventricular Rate 73 BPM    Atrial Rate 75 BPM    QRS Duration 96 ms    Q-T Interval 360 ms    QTC Calculation (Bezet) 396 ms    Calculated R Axis -55 degrees    Calculated T Axis 56 degrees    Diagnosis       Undetermined rhythm  Left anterior fascicular block  No previous ECGs available  Confirmed by Avtar Szymanski MD, Celsa Kelly ([id number]) on 5/22/2018 12:40:20 PM     CBC WITH AUTOMATED DIFF    Collection Time: 05/22/18  8:12 AM   Result Value Ref Range    WBC 6.4 4.1 - 11.1 K/uL    RBC 5.13 4.10 - 5.70 M/uL    HGB 13.4 12.1 - 17.0 g/dL    HCT 42.3 36.6 - 50.3 %    MCV 82.5 80.0 - 99.0 FL    MCH 26.1 26.0 - 34.0 PG    MCHC 31.7 30.0 - 36.5 g/dL    RDW 14.8 (H) 11.5 - 14.5 %    PLATELET 271 277 - 093 K/uL    MPV 8.9 8.9 - 12.9 FL    NRBC 0.0 0  WBC    ABSOLUTE NRBC 0.00 0.00 - 0.01 K/uL    NEUTROPHILS 54 32 - 75 %    LYMPHOCYTES 30 12 - 49 %    MONOCYTES 15 (H) 5 - 13 %    EOSINOPHILS 1 0 - 7 %    BASOPHILS 1 0 - 1 %    IMMATURE GRANULOCYTES 0 0.0 - 0.5 %    ABS. NEUTROPHILS 3.5 1.8 - 8.0 K/UL    ABS. LYMPHOCYTES 1.9 0.8 - 3.5 K/UL    ABS. MONOCYTES 1.0 0.0 - 1.0 K/UL    ABS. EOSINOPHILS 0.1 0.0 - 0.4 K/UL    ABS. BASOPHILS 0.0 0.0 - 0.1 K/UL    ABS. IMM.  GRANS. 0.0 0.00 - 0.04 K/UL    DF AUTOMATED     METABOLIC PANEL, COMPREHENSIVE    Collection Time: 05/22/18  8:12 AM   Result Value Ref Range    Sodium 139 136 - 145 mmol/L    Potassium 3.3 (L) 3.5 - 5.1 mmol/L    Chloride 102 97 - 108 mmol/L    CO2 32 21 - 32 mmol/L    Anion gap 5 5 - 15 mmol/L    Glucose 83 65 - 100 mg/dL    BUN 16 6 - 20 MG/DL    Creatinine 1.25 0.70 - 1.30 MG/DL BUN/Creatinine ratio 13 12 - 20      GFR est AA >60 >60 ml/min/1.73m2    GFR est non-AA 59 (L) >60 ml/min/1.73m2    Calcium 9.2 8.5 - 10.1 MG/DL    Bilirubin, total 0.9 0.2 - 1.0 MG/DL    ALT (SGPT) 27 12 - 78 U/L    AST (SGOT) 28 15 - 37 U/L    Alk. phosphatase 75 45 - 117 U/L    Protein, total 8.2 6.4 - 8.2 g/dL    Albumin 3.4 (L) 3.5 - 5.0 g/dL    Globulin 4.8 (H) 2.0 - 4.0 g/dL    A-G Ratio 0.7 (L) 1.1 - 2.2     TROPONIN I    Collection Time: 05/22/18  8:12 AM   Result Value Ref Range    Troponin-I, Qt. <0.04 <0.05 ng/mL   NT-PRO BNP    Collection Time: 05/22/18  8:12 AM   Result Value Ref Range    NT pro- (H) 0 - 125 PG/ML     Recent Labs      05/22/18   0812   WBC  6.4   HGB  13.4   HCT  42.3   PLT  232     Recent Labs      05/22/18   0812   NA  139   K  3.3*   CL  102   CO2  32   GLU  83   BUN  16   CREA  1.25   CA  9.2   ALB  3.4*   SGOT  28   ALT  27       Imaging  Duplex b/l LE  IMPRESSION:  Bilateral lower extremity vein thrombosis, involving the  popliteal on the right and the popliteal and posterior tibial on the  left. The ultrasound appearance is more consistent with an acute than   a chronic process. CXR Pa/lat   FINDINGS: PA and lateral radiographs of the chest demonstrate small linear  density at the right lung base may represent atelectasis or scarring. . The  cardiac and mediastinal contours and pulmonary vascularity are normal.  The  bones and soft tissues are within normal limits. IMPRESSION: There is a small band of increased density right lung base most  likely area of atelectasis/scarring. Otherwise lungs appear clear. CT Results  (Last 48 hours)               05/22/18 1333  CTA CHEST W OR W WO CONT Final result    Impression:  IMPRESSION:    1. There are acute bilateral pulmonary emboli to the right and left pulmonary   artery level. There are no signs of right heart strain.        2. Lung opacities in the peripheral right middle lobe and right lower lobe may   represent atelectasis or infarcts. The findings were discussed with Dr. Karen Silverman on 5/22/2018 at 1340 hours by Dr. Goran Arias. [id number]           Narrative:  EXAM:  CTA CHEST W OR W WO CONT       INDICATION:  Shortness of breath. COMPARISON: Chest radiographs 5/22/2018       TECHNIQUE: Helical thin section chest CT following uneventful intravenous   administration of nonionic contrast according to departmental PE protocol. Coronal and sagittal reformats were performed. 3D/MIP post processing was   performed. CT dose reduction was achieved through use of a standardized protocol   tailored for this examination and automatic exposure control for dose   modulation. Adaptive statistical iterative reconstruction (ASIR) was utilized. FINDINGS: This is a good quality study for the evaluation of pulmonary embolism   to the first subsegmental arterial level. There are acute bilateral pulmonary   emboli to the right and left pulmonary artery level. The visualized thyroid gland is unremarkable. The aorta and main pulmonary   artery are normal in caliber. Cardiac size is within normal limits. No   pericardial effusion. No lymphadenopathy by imaging size criteria. There are peripheral opacities in the lateral aspect of the right middle lobe   and to a lesser extent the peripheral right lower lobe. There is no lung nodule   or mass. No pleural effusion or pneumothorax. Central airways are unremarkable. Limited images of the upper abdomen are within normal limits.  The bony   structures are age-appropriate                 Assessment and Plan   Principal Problem:    Bilateral pulmonary emboli with possible pulmonary infarcts (POA) - admit inpatient telemetry  - pt symptomatic but hemodynamically stable; no indication for supplemental O2 at this time  - s/p enoxaparin 100mg SC x1 in ED  - pt and wife advised of [facility] options with risks and benefits and they have selected apixaban; will start today  - check TTE although CTA chest noted no right heart strain  - consult Hematology due to recurrent VTE    Active Problems:    Hypertension, uncontrolled (POA) - pt with reported \"white-coat HTN\"  - resume home meds and add hydralazine IV prn      Idiopathic gout - no acute flare up currently  - stop indomethacin due to increased risk of bleeding with apixaban  - may consider starting allopurinol      Bilateral LE DVTs (POA) - pt with h/o recurrent RLE DVT but has not been on AC in a long time  - plan as above      Abnormal ECG (POA) - likely junctional rhythm   - repeat ECG in the AM  - check TTE  - no c/o CP; no indicaton to check troponin      Near syncope (POA) - witnessed in the ED  - likely due to b/l PE with possible lung infarcts      Elevated factor VIII level (POA) - unclear significance  - has been seen by Dr Madhu Jamison, Hematologist, in the past  - consult Hematology today    Diet: cardiac  Activity: ambulate with assistance  Isolation precautions: none  Consultations: Hematology  Anticipated disposition: TBD       Signed by: Nitza Wiggins MD    May 22, 2018 at 1:34 PM

## 2018-05-22 NOTE — CONSULTS
Cardiology Consult Note      Patient Name: Erick Ayala  : 1960 MRN: [id number]  Date: 2018  Time: 12:10 PM    ED Diagnosis: SOB    Primary Cardiologist: Kelly Story M.D. Consulting Cardiologist: Kelly Story M.D.    Reason for Consult: SOB    Requesting MD: Clarita Levine MD    HPI:  Erick Ayala is a 59 y.o. male evaluated in the ED on 2018  for SOB. has a past medical history of BPH (benign prostatic hyperplasia); Gout; Hypertension; and Thromboembolus ([address] Presents to the ED with one week of progressive SOB. With and without activity. H/o PTE and prior [address] use. No chest pain and EKG and troponin level is normal.  Seen in office by Dr. Mykel Cummings for HTN, moderately elevated today. Echo 2017 EF 65-70%, NRWMA, mild MR, TR. Subjective:  No chest pain. SOB, now sometimes without activities. Assessment and Plan     1. SOB   - B/L dopplers notable for partial thrombosis of popliteals and tibial.   - EKG with NRS, troponin 0.04  2. HTN   - Moderate control  3. H/o PTE   - off [address]    SOB, no chest pain and normal cardiac enzymes, dopplers positive for DVT. Recommend CTA of chest and suspect PTE. Needs no further cardiac testing at this time. Cardiology Attending:Patient seen and examined. I agree with NP assessment and plans. Agree with need for CT of chest.    Kelly Story MD 2018 4:13 PM       No specialty comments available.     Review of Systems:     GENERAL   Recent weight loss - no   Fever -----------------   no   Chills -----------------   no     EYES, VISION   Visual Changes - no     EARS, NOSE, THROAT   Hearing loss ----------- no   Swallowing difficulties - no     CARDIOVASCULAR   Chest pain/pressure ---- no   Arrhythmia/palpitations - no       RESPIRATORY   Cough ------------------ no   Shortness of breath - no   Wheezing -------------- no   GASTROINTESTINAL   Abdominal pain - no   Heartburn -------- no   Bloody stool ----- no     GENITOURINARY   Frequent urination - no   Urgency -------------- no     MUSCULOSKELETAL   Joint pain/swelling ---- no   Musculoskeletal pain - no     SKIN & INTEGUMENTARY   Rashes - no   Sores --- no         NEUROLOGICAL   Numbness/tingling - no   Sensation loss ------ no     PSYCHIATRIC   Nervousness/anxiety - no   Depression -------------- no     ENDOCRINE   Heat/cold intolerance - no   Excessive thirst -------- no     HEMATOLOGIC/LYMPHATIC   Abnormal bleeding - no     ALL/IMMUN   Allergic reaction ------ no   Recurrent infections - no     Previous treatment/evaluation includes echocardiogram .  Cardiac risk factors: male gender, hypertension. Past Medical History:   Diagnosis Date    BPH (benign prostatic hyperplasia)     Gout     Hypertension     Thromboembolus [facility])     2009 & 2012     Past Surgical History:   Procedure Laterality Date    HX COLONOSCOPY  2012     Current Facility-Administered Medications   Medication Dose Route Frequency    enoxaparin (LOVENOX) injection 100 mg  1 mg/kg SubCUTAneous NOW     Current Outpatient Prescriptions   Medication Sig    chlorthalidone (HYGROTEN) 25 mg tablet Take 1 Tab by mouth daily.  indomethacin (INDOCIN) 50 mg capsule Take 50 mg by mouth two (2) times a day.  Cholecalciferol, Vitamin D3, 1,000 unit cap Take  by mouth daily.        Allergies   Allergen Reactions    Levaquin [Levofloxacin] Rash      Family History   Problem Relation Age of Onset    Cancer Mother      multiple myeloma    Diabetes Mother     Hypertension Mother     Stroke Mother     Cancer Father      pancreatic    Heart Disease Father     Hypertension Father       Social History     Social History    Marital status:      Spouse name: N/A    Number of children: N/A    Years of education: N/A     Social History Main Topics    Smoking status: Never Smoker    Smokeless tobacco: Never Used    Alcohol use No    Drug use: No    Sexual activity: Yes     Partners: Female      Comment:  [employer]     Other Topics Concern    None     Social History Narrative       Objective:    Physical Exam    Vitals:   Vitals:    05/22/18 0808   BP: (!) 167/97   Pulse: 76   Resp: 16   Temp: 98.6 °F (37 °C)   SpO2: 99%   Weight: 212 lb 2 oz (96.2 kg)   Height: 6' (1.829 m)       General:    Alert, cooperative, no distress, appears stated age. Neck:   Supple, no carotid bruit and no JVD. Back:     Symmetric, normal curvature. Lungs:     Clear to auscultation bilaterally. Heart[de-identified]    Regular rate and rhythm, S1, S2 normal, no murmur, click, rub or gallop. Abdomen:     Soft, non-tender. Bowel sounds normal.    Extremities:   Extremities normal, atraumatic, no cyanosis or edema. Vascular:   Pulses - 2+ radials   Skin:   Skin color normal. No rashes or lesions   Neurologic:   CN II-XII grossly intact. Telemetry: normal sinus rhythm    ECG:   EKG Results     Procedure 720 Value Units Date/Time    EKG 12 LEAD INITIAL [[id number]] Collected:  05/22/18 0806    Order Status:  Completed Updated:  05/22/18 0807     Ventricular Rate 73 BPM      Atrial Rate 75 BPM      QRS Duration 96 ms      Q-T Interval 360 ms      QTC Calculation (Bezet) 396 ms      Calculated R Axis -55 degrees      Calculated T Axis 56 degrees      Diagnosis --     Undetermined rhythm  Left anterior fascicular block  No previous ECGs available            Data Review:     Radiology:   XR Results (most recent):    Results from [place] encounter on 05/22/18   XR CHEST PA LAT   Narrative EXAM:  XR CHEST PA LAT    INDICATION:   sob    COMPARISON: None. FINDINGS: PA and lateral radiographs of the chest demonstrate small linear  density at the right lung base may represent atelectasis or scarring. . The  cardiac and mediastinal contours and pulmonary vascularity are normal.  The  bones and soft tissues are within normal limits.           Impression IMPRESSION: There is a small band of increased density right lung base most  likely area of atelectasis/scarring. Otherwise lungs appear clear. Recent Labs      05/22/18   0812   TROIQ  <0.04     Recent Labs      05/22/18   0812   NA  139   K  3.3*   CL  102   CO2  32   BUN  16   CREA  1.25   GLU  83   CA  9.2     Recent Labs      05/22/18   0812   WBC  6.4   HGB  13.4   HCT  42.3   PLT  232     Recent Labs      05/22/18   0812   SGOT  28   AP  75     No results for input(s): CHOL, LDLC in the last 72 hours. No lab exists for component: TGL, HDLC,  HBA1C  No results for input(s): CRP, TSH, TSHEXT in the last 72 hours. No lab exists for component: ESR    John Jones. Ifeanyi Millre M.D.          [facility] [address] [place] [address] [address] [facility] [address]     [place] [phone] MD [place]

## 2018-05-22 NOTE — IP AVS SNAPSHOT
Summary of Care Report The Summary of Care report has been created to help improve care coordination. Users with access to "[employer] or [address] (Web-based application) may access additional patient information including the Discharge Summary. If you are not currently a [address] user and need more information, please call the number listed below in the [place] [address] section and ask to be connected with Medical Records. Facility Information Name Address Phone [address] [facility] 7 [phone] Patient Information Patient Name Sex  Pool Show ([id number]) Male 1960 Discharge Information Admitting Provider Service Area Unit Jaime Quesada MD / [address] [facility] / [phone] Discharge Provider Discharge Date/Time Discharge Disposition Destination (none) 2018 Afternoon (Pending) AHR (none) Patient Language Language ENGLISH [13] Hospital Problems as of 2018  Reviewed: 2018  1:35 PM by Jaime Quesada MD  
  
  
  
 Class Noted - Resolved Last Modified POA Active Problems Hypertension, uncontrolled  8/15/2017 - Present 2018 by Jaime Quesada MD Yes Entered by Jose Murry Idiopathic gout  8/15/2017 - Present 2018 by Jaime Quesada MD Yes Entered by Jose Murry DVT, bilateral lower limbs ([address]  2018 - Present 2018 by Jaime Quesada MD Yes Entered by Jaime Quesada MD  
  Abnormal ECG  2018 - Present 2018 by Jaime Quesada MD Yes Entered by Jaime Quesada MD  
  Near syncope  2018 - Present 2018 by Jaime Quesada MD Yes Entered by Jaime Quesada MD  
  * (Principal)Bilateral pulmonary embolism ([address]  2018 - Present 2018 by Jaime Quesada MD Yes   Entered by Jaime Quesada MD  
 Elevated factor VIII level  5/22/2018 - Present 5/22/2018 by Jessica Bower MD Unknown Entered by Jessica Bower MD  
  
Non-Hospital Problems as of 5/24/2018  Reviewed: 5/22/2018  1:35 PM by Jessica Bower MD  
  
  
  
 Class Noted - Resolved Last Modified Active Problems Thromboembolus [facility])  Unknown - Present 4/22/2014 by Alize Garcia Entered by Alize Garcia Overview Addendum 4/22/2014  6:31 AM by Alize Garcia 2009 & 2012. 2014 elevated D dimer. BPH (benign prostatic hyperplasia)  10/11/2013 - Present 8/15/2017 by Ana Levine Entered by Alize Garcia Overview Signed 8/15/2017  8:17 AM by Ana Levine 2015 biopsy negative. ED (erectile dysfunction)  11/25/2013 - Present 11/25/2013 by Alize Garcia Entered by Alize Garcia Palpitations  6/22/2017 - Present 6/22/2017 by Phoebe Donovan LPN Entered by Phoebe Donovan LPN White coat syndrome with diagnosis of hypertension  8/15/2017 - Present 8/15/2017 by Ana Levine Entered by Ana Levine Dilated aortic root ([address]  9/18/2017 - Present 9/18/2017 by Ana Levine Entered by Ana Levine You are allergic to the following Allergen Reactions Levaquin (Levofloxacin) Rash Current Discharge Medication List  
  
START taking these medications Dose & Instructions Dispensing Information Comments * apixaban 5 mg tablet Commonly known as:  Alondra Copgilmar Dose:  10 mg Take 2 Tabs by mouth two (2) times a day. Quantity:  11 Tab Refills:  0  
   
 * apixaban 5 mg tablet Commonly known as:  Alondra Copier Start taking on:  5/29/2018 Dose:  5 mg Take 1 Tab by mouth two (2) times a day. Quantity:  60 Tab Refills:  0  
   
 * Notice: This list has 2 medication(s) that are the same as other medications prescribed for you.  Read the directions carefully, and ask your doctor or other care provider to review them with you. CONTINUE these medications which have NOT CHANGED Dose & Instructions Dispensing Information Comments  
 chlorthalidone 25 mg tablet Commonly known as:  Cameron Champ Dose:  25 mg Take 1 Tab by mouth daily. Quantity:  90 Tab Refills:  1 cholecalciferol 1,000 unit Cap Commonly known as:  VITAMIN D3 Dose:  1000 Units Take 1,000 Units by mouth daily. Refills:  0 ASK your doctor about these medications Dose & Instructions Dispensing Information Comments  
 indomethacin 50 mg capsule Commonly known as:  INDOCIN Dose:  50 mg Take 50 mg by mouth two (2) times a day. Refills:  0 Follow-up Information None Discharge Instructions Patient Discharge Instructions Gerardo Treadwell / [id number] : 1960 Admitted 2018 Discharged: 2018 9:49 AM  
 
ACUTE DIAGNOSES: 
DVT, bilateral lower limbs ([facility] CHRONIC MEDICAL DIAGNOSES: 
Problem List as of 2018  Date Reviewed: 2018 Codes Class Noted - Resolved DVT, bilateral lower limbs (HCC) ICD-10-CM: I82.403 ICD-9-CM: 453.40  2018 - Present Abnormal ECG ICD-10-CM: R94.31 
ICD-9-CM: 794.31  2018 - Present Near syncope ICD-10-CM: R55 
ICD-9-CM: 780.2  2018 - Present * (Principal)Bilateral pulmonary embolism ([facility] ICD-10-CM: I26.99 
ICD-9-CM: 415.19  2018 - Present Elevated factor VIII level ICD-10-CM: R79.1 ICD-9-CM: 790.92  2018 - Present Dilated aortic root (HCC) ICD-10-CM: V30.792 ICD-9-CM: 447.71  2017 - Present Hypertension, uncontrolled ICD-10-CM: I10 
ICD-9-CM: 401.9  8/15/2017 - Present White coat syndrome with diagnosis of hypertension ICD-10-CM: I10 
ICD-9-CM: 401.9  8/15/2017 - Present Idiopathic gout ICD-10-CM: M10.00 ICD-9-CM: 274.9  8/15/2017 - Present Palpitations ICD-10-CM: R00.2 ICD-9-CM: 785.1  6/22/2017 - Present ED (erectile dysfunction) ICD-10-CM: N52.9 ICD-9-CM: 607.84  11/25/2013 - Present Thromboembolus ([address] ICD-10-CM: I74.9 ICD-9-CM: 444.9  Unknown - Present Overview Addendum 4/22/2014  6:31 AM by Lauri Stephens 2009 & 2012. 2014 elevated D dimer. BPH (benign prostatic hyperplasia) ICD-10-CM: N40.0 ICD-9-CM: 600.00  10/11/2013 - Present Overview Signed 8/15/2017  8:17 AM by Eagle Sloan 2015 biopsy negative. RESOLVED: White coat hypertension ICD-10-CM: Gonsalo Zunigakins ICD-9-CM: Gonsalo Zunigakins  1/20/2016 - 3/16/2018 RESOLVED: Hypertension ICD-10-CM: I10 
ICD-9-CM: 401.9  Unknown - 8/15/2017 DISCHARGE MEDICATIONS:  
 
 
 
 
· It is important that you take the medication exactly as they are prescribed. · Keep your medication in the bottles provided by the pharmacist and keep a list of the medication names, dosages, and times to be taken in your wallet. · Do not take other medications without consulting your doctor. DIET:  Regular Diet ACTIVITY: Activity as tolerated ADDITIONAL INFORMATION: If you experience any of the following symptoms then please call your primary care physician or return to the emergency room if you cannot get hold of your doctor: Fever, chills, nausea, vomiting, diarrhea, change in mentation, falling, bleeding, shortness of breath. FOLLOW UP CARE: 
Dr. Ritter primary care provider on file. August Chong Please call and set up an appointment to see them in 2 weeks. Call pcp  at the number below and set up an appointment to see them in 1 week: Information obtained by : 
I understand that if any problems occur once I am at home I am to contact my physician. I understand and acknowledge receipt of the instructions indicated above. Physician's or R.N.'s Signature                                                                  Date/Time Patient or Representative Signature                                                          Date/Time MyChart Activation Thank you for requesting access to [employer]. Please follow the instructions below to securely access and download your online medical record. [employer] allows you to send messages to your doctor, view your test results, renew your prescriptions, schedule appointments, and more. How Do I Sign Up? 1. In your internet browser, go to [email or web address]. 2. Click on the First Time User? Click Here link in the Sign In box. You will see the New Member Sign Up page. 3. Enter your Fluttert Access Code exactly as it appears below. You will not need to use this code after youve completed the sign-up process. If you do not sign up before the expiration date, you must request a new code. [employer] Access Code: Activation code not generated Current [employer] Status: Active (This is the date your [employer] access code will ) 4. Enter the last four digits of your Social Security Number (xxxx) and Date of Birth (mm/dd/yyyy) as indicated and click Submit. You will be taken to the next sign-up page. 5. Create a Artk[employer] ID. This will be your [employer] login ID and cannot be changed, so think of one that is secure and easy to remember. 6. Create a [employer] password. You can change your password at any time. 7. Enter your Password Reset Question and Answer. This can be used at a later time if you forget your password. 8. Enter your e-mail address. You will receive e-mail notification when new information is available in [address] 9. Click Sign Up. You can now view and download portions of your medical record. 10. Click the Download Summary menu link to download a portable copy of your medical information. Additional Information If you have questions, please visit the Frequently Asked Questions section of the [employer] website at [email or web address] Remember, [employer] is NOT to be used for urgent needs. For medical emergencies, dial 911. Chart Review Routing History Recipient Method Report Sent By Jaunita Sables Corinne Popp, MD  
Phone: [phone] In Basket Note Review Marvin Joshi MD [[id number]] 1/3/2014  4:32 PM 01/03/2014 Beatriz Strauss PA-C Phone: [phone] In [employer] Routed Notes Tosha Salas, [address] [place] [address] [[id number]] 5/22/2018 10:07 PM 05/22/2018

## 2018-05-22 NOTE — PROCEDURES
[facility]  *** FINAL REPORT ***    Name: Tank Liu  MRN: [id number]  : 22 Dec 1960  HIS Order #: [id number]  [address] [facility] Visit #: [id number]  Date: 22 May 2018    TYPE OF TEST: Peripheral Venous Testing    REASON FOR TEST  SOB. Suspicion of pulmonary embolism. Right Leg:-  Deep venous thrombosis:           Yes  Proximal extent of thrombus:      Popliteal At The Knee  Superficial venous thrombosis:    No  Deep venous insufficiency:        Not examined  Superficial venous insufficiency: Not examined    Left Leg:-  Deep venous thrombosis:           Yes  Proximal extent of thrombus:      Popliteal At The Knee  Superficial venous thrombosis:    No  Deep venous insufficiency:        Not examined  Superficial venous insufficiency: Not examined      INTERPRETATION/FINDINGS  PROCEDURE:  Color duplex ultrasound imaging of lower extremity veins. FINDINGS:       Right: The popliteal is partially compressible and there is  narrowing of the flow channel, consistent with partial thrombosis. The common femoral, deep femoral, femoral, posterior tibial, and great   saphenous are patent and without evidence of thrombus;  each is fully   compressible and there is no narrowing of the flow channel on color  Doppler imaging. Phasic flow is observed in the common femoral vein. The peroneal is not visualized. Left:   The popliteal is partially compressible and there is  narrowing of the flow channel, consistent with partial thrombosis. The posterior tibial is not compressible and there is no flow detected   by Doppler, consistent with thrombosis. The common femoral, deep  femoral, femoral, and great saphenous are patent and without evidence  of thrombus;  each is fully compressible and there is no narrowing of  the flow channel on color Doppler imaging. Phasic flow is observed in   the common femoral vein. The peroneal is not visualized.     IMPRESSION:  Bilateral lower extremity vein thrombosis, involving the  popliteal on the right and the popliteal and posterior tibial on the  left. The ultrasound appearance is more consistent with an acute than   a chronic process. ADDITIONAL COMMENTS    Preliminary findings given to Shahida Gan DO by Ramila Couch on  5/22/18 at 12:04 pm    I have personally reviewed the data relevant to the interpretation of  this  study.     TECHNOLOGIST: Ramila Couch RVT  Signed: 05/22/2018 12:06 PM    PHYSICIAN: Wei Johnson MD  Signed: 05/23/2018 03:41 PM

## 2018-05-22 NOTE — ED PROVIDER NOTES
HPI Comments: 59 y.o. male with past medical history significant for gout, hypertension, and DVT who presents, accompanied by wife, with chief complaint of shortness of breath. Patient noticed LONG 1 week ago while going up steps at work; patient normally has no shortness of breath with walking up steps. Over the past few days patient has had persistent LONG and lightheadedness. Patient reports while in ED he \"felt like everything was closing in\" and thought he was going to pass out. Patient reports \"slight\" right-sided chest pressure that is worse with deep inspiration. Patient has a history of blood clots and is not currently on anticoagulants or ASA. No family history of early CAD. Patient denies cough, fever, unilateral leg swelling. There are no other acute medical concerns at this time. Social hx: Nonsmoker  PCP: Luisito Maurer MD  Cardiologist: Dr. Mykel Cummings  Hematologist: Dr. Mayte Casey    Note written by Douglas Lang. Kevin Siddiqi, as dictated by Mirta Noe DO 10:03 AM    12:25 PM  Mirta Noe DO reviewed electronic medical record system for any past medical records that were available that may contribute to the patient's current condition. 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 The history is provided by the patient.         Past Medical History:   Diagnosis Date    BPH (benign prostatic hyperplasia)     Gout     Hypertension     Thromboembolus ([address]     2009 & 2012       Past Surgical History:   Procedure Laterality Date    HX COLONOSCOPY  2012         Family History:   Problem Relation Age of Onset    Cancer Mother      multiple myeloma    Diabetes Mother     Hypertension Mother     Stroke Mother     Cancer Father      pancreatic    Heart Disease Father     Hypertension Father        Social History     Social History    Marital status:      Spouse name: N/A    Number of children: N/A    Years of education: N/A     Occupational History    Not on file. Social History Main Topics    Smoking status: Never Smoker    Smokeless tobacco: Never Used    Alcohol use No    Drug use: No    Sexual activity: Yes     Partners: Female      Comment:  [employer]     Other Topics Concern    Not on file     Social History Narrative         ALLERGIES: Levaquin [levofloxacin]    Review of Systems   Constitutional: Negative for fever. HENT: Negative for trouble swallowing. Eyes: Negative for visual disturbance. Respiratory: Positive for shortness of breath. Negative for cough. Cardiovascular: Positive for chest pain. Gastrointestinal: Negative for abdominal pain. Genitourinary: Negative for difficulty urinating. Musculoskeletal: Negative for gait problem. Skin: Negative for rash. Neurological: Positive for light-headedness. Negative for headaches. Hematological: Does not bruise/bleed easily. Psychiatric/Behavioral: Negative for sleep disturbance. All other systems reviewed and are negative. Vitals:    05/22/18 0808   BP: (!) 167/97   Pulse: 76   Resp: 16   Temp: 98.6 °F (37 °C)   SpO2: 99%   Weight: 96.2 kg (212 lb 2 oz)   Height: 6' (1.829 m)            Physical Exam   Constitutional: He is oriented to person, place, and time. He appears well-developed and well-nourished. HENT:   Head: Normocephalic and atraumatic. Eyes: Conjunctivae are normal.   Neck: Normal range of motion. Cardiovascular: Normal rate and intact distal pulses. Pulmonary/Chest: Effort normal and breath sounds normal. No respiratory distress. Abdominal: Soft. Bowel sounds are normal. There is no tenderness. There is no rebound and no guarding. Musculoskeletal: Normal range of motion. He exhibits edema (1+ pitting edema to b/l lower extremities). Neurological: He is alert and oriented to person, place, and time. Skin: Skin is warm and dry.    Psychiatric: His behavior is normal.   Nursing note and vitals reviewed. Note written by Zandra Lares, as dictated by Magy Jerez DO 10:07 AM       MDM  Number of Diagnoses or Management Options  Critical Care  Total time providing critical care: (Total critical care time spent exclusive of procedures: 60 minutes  )        ED Course       Procedures    ED EKG interpretation 0806:  Rhythm: normal sinus rhythm and junctional; Rate (approx.): 73; Axis: left axis deviation; Normal QRS and QTc; No ST changes. Note written by Zandra Lares, as dictated by Magy Jerez DO 10:08 AM    CONSULT NOTE:  12:22 PM Magy Jerez DO communicated with Hoa Rodriguez NP, Consult for Hospitalist via [employer]. Discussed available diagnostic tests and clinical findings. Dr. Lucas Lopes has been assigned. CONSULT NOTE:  12:24 PM Magy Jerez DO spoke with Dr. Lucas Lopes, Consult for Hospitalist.  Discussed available diagnostic tests and clinical findings. Dr. Lucas Lopes will evaluate patient. 1:43 PM  Radiologist called - patient with bilateral PEs on CT with no signs of right heart strain.

## 2018-05-22 NOTE — ED TRIAGE NOTES
Sob x one week, denies cp, denies fever or chills, denies n/v, denies cough, denies any leg/calf pain, denies any recent travels

## 2018-05-22 NOTE — PROGRESS NOTES
Clinical Care Lead Arrival Summary        Name: Yuliya Otto  MRN: [id number]  Date: 2018 1:28 PM  Admission Date: 2018  : 1960  Situation:  18 SHORTNESS OF BREATH FROM ED    Background:  Medical History      Past Medical History Date Comments   BPH (benign prostatic hyperplasia) [N40.0]     Gout [M10.9]     Hypertension [I10]     Thromboembolus ([address] [I74.9]   &       Surgical History      Past Surgical History Laterality Date Comments   HX COLONOSCOPY[LBG037]                  The Beta blocker the patient is taking is [unfilled], nONE        STAND: iF INDICATED ON ADMISSION DATABASE  Voice quality/secretions:    Hx of dysphagia:    O2 sat:    Alertness:      Flu vaccination received for current season: NOT FLU SEASON       VTE Documentation-CHEMICAL  VTE Risk Assessment    Mechanical VTE orders:    Venous Foot Pump:    Graduated Compression Stockings:    Sequential Compression Devices:    Patient Refused VTE:        Diet:                Assessment:    Lines/Drains/Airways:   Peripheral IV 18 Right Antecubital (Active)   Site Assessment Clean, dry, & intact 2018  8:14 AM   Phlebitis Assessment 0 2018  8:14 AM   Infiltration Assessment 0 2018  8:14 AM   Dressing Status Clean, dry, & intact 2018  8:14 AM   Dressing Type Transparent 2018  8:14 AM   Hub Color/Line Status Pink;Flushed;Patent 2018  8:14 AM   Action Taken Blood drawn 2018  8:14 AM       Last 3 Weights:  Last 3 Recorded Weights in this Encounter    18 0808   Weight: 96.2 kg (212 lb 2 oz)     DAILY STANDING WEIGHT BETWEEN MIDNIGHT AND 0700  Recommendations: GENERAL MEDICAL PATHWAY  Consult Orders: )IP CONSULT TO CARDIOLOGY  Recent orders:  XR CHEST PA LAT  DUPLEX LOWER EXT VENOUS BILAT  CTA CHEST W OR W WO CONT  IP CONSULT TO CARDIOLOGY  INITIAL PHYSICIAN ORDER: INPATIENT  CARDIAC MONITORING    Core Measures Compliant   CHF:NA   Pneumonia:NA   Vaccines:NOT FLU SEASON SCIP:NA   Amanda:DIDIER   AMI:NA

## 2018-05-22 NOTE — ED NOTES
Pt ambulatory to room 6 with steady gait. Wife at bedside. Pt changing into gown. Awaiting hospitalist evaluation. Bedside shift change report given to TATE Dumont (oncoming nurse) by Clearance TATE Samaniego (offgoing nurse). Report included the following information SBAR, ED Summary, Procedure Summary and Recent Results.

## 2018-05-22 NOTE — ED NOTES
Pt had episode of dizziness and \"felt like everything was closing in.\"   Pt remained conscious and talking to this RN the entire episode. /113 in right arm, 169/102 in left arm, pulse 65, regular. Denies SOB.

## 2018-05-22 NOTE — ROUTINE PROCESS
TRANSFER - OUT REPORT:    Verbal report given to Maurice Rolle RN(name) on [employer]  being transferred to [facility](unit) for routine progression of care       Report consisted of patients Situation, Background, Assessment and   Recommendations(SBAR). Information from the following report(s) SBAR was reviewed with the receiving nurse. Lines:   Peripheral IV 05/22/18 Right Antecubital (Active)   Site Assessment Clean, dry, & intact 5/22/2018  8:14 AM   Phlebitis Assessment 0 5/22/2018  8:14 AM   Infiltration Assessment 0 5/22/2018  8:14 AM   Dressing Status Clean, dry, & intact 5/22/2018  8:14 AM   Dressing Type Transparent 5/22/2018  8:14 AM   Hub Color/Line Status Pink;Flushed;Patent 5/22/2018  8:14 AM   Action Taken Blood drawn 5/22/2018  8:14 AM        Opportunity for questions and clarification was provided.       Patient transported with:   Monitor

## 2018-05-22 NOTE — IP AVS SNAPSHOT
[address] [facility] [address] [place] [address] 
[phone] Patient: Gerardo Avalos MRN: [id number] IQD:78/[birth year]42 A check dorothy indicates which time of day the medication should be taken. My Medications START taking these medications Instructions Each Dose to Equal  
 Morning Noon Evening Bedtime * apixaban 5 mg tablet Commonly known as:  Articnguyễn Rolf Your last dose was: Your next dose is: Take 2 Tabs by mouth two (2) times a day. 10 mg  
    
   
   
   
  
 * apixaban 5 mg tablet Commonly known as:  Artice Rolf Start taking on:  5/29/2018 Your last dose was: Your next dose is: Take 1 Tab by mouth two (2) times a day. 5 mg * Notice: This list has 2 medication(s) that are the same as other medications prescribed for you. Read the directions carefully, and ask your doctor or other care provider to review them with you. CONTINUE taking these medications Instructions Each Dose to Equal  
 Morning Noon Evening Bedtime  
 chlorthalidone 25 mg tablet Commonly known as:  Ninoska Sous Your last dose was: Your next dose is: Take 1 Tab by mouth daily. 25 mg  
    
   
   
   
  
 cholecalciferol 1,000 unit Cap Commonly known as:  VITAMIN D3 Your last dose was: Your next dose is: Take 1,000 Units by mouth daily. 1000 Units ASK your doctor about these medications Instructions Each Dose to Equal  
 Morning Noon Evening Bedtime  
 indomethacin 50 mg capsule Commonly known as:  INDOCIN Your last dose was: Your next dose is: Take 50 mg by mouth two (2) times a day. 50 mg Where to Get Your Medications Information on where to get these meds will be given to you by the nurse or doctor. ! Ask your nurse or doctor about these medications  
  apixaban 5 mg tablet  
 apixaban 5 mg tablet

## 2018-05-22 NOTE — PROGRESS NOTES
Bedside and Verbal shift change report given to Lance Robledo (oncoming nurse) by Jean Claude Navarrete RN (offgoing nurse). Report included the following information SBAR, Kardex, Intake/Output, MAR and Recent Results.

## 2018-05-23 LAB
ALBUMIN SERPL-MCNC: 3 G/DL (ref 3.5–5)
ALBUMIN/GLOB SERPL: 0.6 {RATIO} (ref 1.1–2.2)
ALP SERPL-CCNC: 68 U/L (ref 45–117)
ALT SERPL-CCNC: 25 U/L (ref 12–78)
ANION GAP SERPL CALC-SCNC: 9 MMOL/L (ref 5–15)
AST SERPL-CCNC: 23 U/L (ref 15–37)
ATRIAL RATE: 68 BPM
BASOPHILS # BLD: 0 K/UL (ref 0–0.1)
BASOPHILS NFR BLD: 1 % (ref 0–1)
BILIRUB SERPL-MCNC: 1.1 MG/DL (ref 0.2–1)
BUN SERPL-MCNC: 13 MG/DL (ref 6–20)
BUN/CREAT SERPL: 13 (ref 12–20)
CALCIUM SERPL-MCNC: 9.2 MG/DL (ref 8.5–10.1)
CALCULATED P AXIS, ECG09: 57 DEGREES
CALCULATED R AXIS, ECG10: -43 DEGREES
CALCULATED T AXIS, ECG11: 40 DEGREES
CHLORIDE SERPL-SCNC: 101 MMOL/L (ref 97–108)
CO2 SERPL-SCNC: 26 MMOL/L (ref 21–32)
CREAT SERPL-MCNC: 1.04 MG/DL (ref 0.7–1.3)
DIAGNOSIS, 93000: NORMAL
DIFFERENTIAL METHOD BLD: ABNORMAL
EOSINOPHIL # BLD: 0.1 K/UL (ref 0–0.4)
EOSINOPHIL NFR BLD: 2 % (ref 0–7)
ERYTHROCYTE [DISTWIDTH] IN BLOOD BY AUTOMATED COUNT: 14.6 % (ref 11.5–14.5)
GLOBULIN SER CALC-MCNC: 4.7 G/DL (ref 2–4)
GLUCOSE SERPL-MCNC: 105 MG/DL (ref 65–100)
HCT VFR BLD AUTO: 40.2 % (ref 36.6–50.3)
HGB BLD-MCNC: 13.1 G/DL (ref 12.1–17)
IMM GRANULOCYTES # BLD: 0 K/UL (ref 0–0.04)
IMM GRANULOCYTES NFR BLD AUTO: 0 % (ref 0–0.5)
LYMPHOCYTES # BLD: 1.9 K/UL (ref 0.8–3.5)
LYMPHOCYTES NFR BLD: 33 % (ref 12–49)
MCH RBC QN AUTO: 26.2 PG (ref 26–34)
MCHC RBC AUTO-ENTMCNC: 32.6 G/DL (ref 30–36.5)
MCV RBC AUTO: 80.4 FL (ref 80–99)
MONOCYTES # BLD: 0.9 K/UL (ref 0–1)
MONOCYTES NFR BLD: 16 % (ref 5–13)
NEUTS SEG # BLD: 2.8 K/UL (ref 1.8–8)
NEUTS SEG NFR BLD: 49 % (ref 32–75)
NRBC # BLD: 0 K/UL (ref 0–0.01)
NRBC BLD-RTO: 0 PER 100 WBC
P-R INTERVAL, ECG05: 158 MS
PLATELET # BLD AUTO: 237 K/UL (ref 150–400)
PMV BLD AUTO: 9.5 FL (ref 8.9–12.9)
POTASSIUM SERPL-SCNC: 3.4 MMOL/L (ref 3.5–5.1)
PROT SERPL-MCNC: 7.7 G/DL (ref 6.4–8.2)
Q-T INTERVAL, ECG07: 388 MS
QRS DURATION, ECG06: 114 MS
QTC CALCULATION (BEZET), ECG08: 412 MS
RBC # BLD AUTO: 5 M/UL (ref 4.1–5.7)
SODIUM SERPL-SCNC: 136 MMOL/L (ref 136–145)
TROPONIN I SERPL-MCNC: <0.04 NG/ML
VENTRICULAR RATE, ECG03: 68 BPM
WBC # BLD AUTO: 5.7 K/UL (ref 4.1–11.1)

## 2018-05-23 PROCEDURE — 93041 RHYTHM ECG TRACING: CPT

## 2018-05-23 PROCEDURE — 36415 COLL VENOUS BLD VENIPUNCTURE: CPT | Performed by: INTERNAL MEDICINE

## 2018-05-23 PROCEDURE — 65660000000 HC RM CCU STEPDOWN

## 2018-05-23 PROCEDURE — 93306 TTE W/DOPPLER COMPLETE: CPT

## 2018-05-23 PROCEDURE — 80053 COMPREHEN METABOLIC PANEL: CPT | Performed by: INTERNAL MEDICINE

## 2018-05-23 PROCEDURE — 85025 COMPLETE CBC W/AUTO DIFF WBC: CPT | Performed by: INTERNAL MEDICINE

## 2018-05-23 PROCEDURE — 74011250637 HC RX REV CODE- 250/637: Performed by: HOSPITALIST

## 2018-05-23 PROCEDURE — 84484 ASSAY OF TROPONIN QUANT: CPT | Performed by: HOSPITALIST

## 2018-05-23 PROCEDURE — 74011250637 HC RX REV CODE- 250/637: Performed by: INTERNAL MEDICINE

## 2018-05-23 RX ORDER — POTASSIUM CHLORIDE 750 MG/1
20 TABLET, FILM COATED, EXTENDED RELEASE ORAL
Status: COMPLETED | OUTPATIENT
Start: 2018-05-23 | End: 2018-05-23

## 2018-05-23 RX ADMIN — APIXABAN 10 MG: 5 TABLET, FILM COATED ORAL at 08:23

## 2018-05-23 RX ADMIN — APIXABAN 10 MG: 5 TABLET, FILM COATED ORAL at 17:40

## 2018-05-23 RX ADMIN — Medication 10 ML: at 06:00

## 2018-05-23 RX ADMIN — Medication 10 ML: at 21:28

## 2018-05-23 RX ADMIN — VITAMIN D, TAB 1000IU (100/BT) 1000 UNITS: 25 TAB at 08:23

## 2018-05-23 RX ADMIN — POTASSIUM CHLORIDE 20 MEQ: 750 TABLET, EXTENDED RELEASE ORAL at 12:22

## 2018-05-23 RX ADMIN — Medication 10 ML: at 15:30

## 2018-05-23 RX ADMIN — CHLORTHALIDONE 25 MG: 25 TABLET ORAL at 08:23

## 2018-05-23 NOTE — PROGRESS NOTES
Heme/ONC  Came by to see pt and he was transferring rooms  If pt is d/c'd, can f/u with Dr Marquis Juares as outpt  If still here in am, will see pt in am.

## 2018-05-23 NOTE — PROGRESS NOTES
0400: patient called out saying he was short of breath and had chest pain. RN went into room to do EKG that was ordered by MD 5/22/18. Patient stated he was in 8 out of 10 chest pressure/throbbing pain. He was tachypneac with respirations at 22.     0415: EKG completed and vitals taken, vitals stable. 2 liters of O2 nasal cannula placed on patient for comfort and patient sat up in bed.    0420: paged MD on call.    0422: Dr. Wyatt Del Toro called back and ordered troponin x1 now. 0425: troponin drawn and sent to lab. 18: Dr. Wyatt Del Toro walked to floor to see EKG, no orders given. 0530: troponin level WNL. Patient states he feels better but gets uncomfortable when he moves. 2 liters O2 still on patient for comfort. 8457: patient states he feels better but still has some discomfort.

## 2018-05-23 NOTE — PROGRESS NOTES
Reason for Admission:   Pulmonary Embolism                   RRAT Score:      0               Plan for utilizing home health:   Not indicated at this time                       Likelihood of Readmission:  Low                         Transition of Care Plan:   CM met with patient and wife, to discuss discharge planning. Patient lives with his wife in their private residence, he is independent without any assistive devices. Patient's wife will provide transportation home and he did not voice any discharge barriers. Patient requiring O2 at this time but did not require O2 at home. Patient will need to be weaned off O2 to determine further need. Patient may be discharged tomorrow if medically stable. CM will continue to follow as needed. Lina Butcher MSA, RN, CRM. Care Management Interventions  PCP Verified by CM: Yes  Palliative Care Criteria Met (RRAT>21 & CHF Dx)?: No  Mode of Transport at Discharge:  Other (see comment) (Private car)  Transition of Care Consult (CM Consult): Discharge Planning  MyChart Signup: No  Discharge Durable Medical Equipment: No  Health Maintenance Reviewed: Yes  Physical Therapy Consult: No  Occupational Therapy Consult: No  Speech Therapy Consult: No  Current Support Network: Lives with Spouse  Confirm Follow Up Transport: Family  Plan discussed with Pt/Family/Caregiver: Yes  Discharge Location  Discharge Placement: Home with family assistance

## 2018-05-23 NOTE — PROGRESS NOTES
Bedside and Verbal shift change report given to Rome Mckeon (oncoming nurse) by Porsha West (offgoing nurse). Report included the following information SBAR, Kardex, MAR and Recent Results.

## 2018-05-23 NOTE — PROGRESS NOTES
Bedside shift change report given to [facility] by Lor Antunez. Report included the following information SBAR, Kardex and MAR.

## 2018-05-23 NOTE — CONSULTS
[facility] [address] [place]: [phone]  F: [phone]    Reason for Visit:   Wendy Jordan is a 59 y.o. male who is seen in consultation at the request of Dr. Sushant Reardon for evaluation of PE. History of Present Illness:   Mr. Jessica Boyd is a 59 y.o. male who is currently admitted with bilateral lower extremity DVT and PE. He presented to the ED yesterday with shortness of breath. CTA showed acute bilateral pulmonary emboli to the right and left pulmonary artery level, no signs of right heart strain. Lung opacities noted in the right middle and lower lobe that may represent either atelectasis or infarcts. Doppler of the lower extremities showed bilateral DVT's as well. He received 1 dose of enoxaparin in the ED (100 mg). He then started on apixaban on 5/22/18. He reports breathing has been better this afternoon but he does remain on oxygen. His chest pain is less. He has been less active and sitting more at work recently. Mr. Jessica Boyd was seen by Dr. 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 He was initially found to have an unprovoked right DVT in 2009, treated with 6 months of Coumadin. Then in 2012, diagnosed with another right lower extremity DVT, initially treated with Lovenox and bridged to Coumadin. He completed 6 months of Coumadin at that time as well. The appearance of the VTE in 2012 on doppler was potentially chronic in appearance, so it was difficult to determine if this was a new clot vs. chronic thrombosis. In 2014, he was found to have an elevated Factor VIII level and was sent to Dr. Murile Ha for evaluation. Recommendation was for life-long anticoagulation at the time based on history of unprovoked VTE, but patient was hesitant to this and thus was started on aspirin 81 mg daily.     Past Medical History:   Diagnosis Date    BPH (benign prostatic hyperplasia)     Gout     Hypertension     Thromboembolus [facility])     2009 & 2012 Past Surgical History:   Procedure Laterality Date    HX COLONOSCOPY  2012      Social History   Substance Use Topics    Smoking status: Never Smoker    Smokeless tobacco: Never Used    Alcohol use No      Family History   Problem Relation Age of Onset    Cancer Mother      multiple myeloma    Diabetes Mother     Hypertension Mother     Stroke Mother     Cancer Father      pancreatic    Heart Disease Father     Hypertension Father      Current Facility-Administered Medications   Medication Dose Route Frequency    chlorthalidone (HYGROTEN) tablet 25 mg  25 mg Oral DAILY    cholecalciferol (VITAMIN D3) tablet 1,000 Units  1,000 Units Oral DAILY    sodium chloride (NS) flush 5-10 mL  5-10 mL IntraVENous Q8H    sodium chloride (NS) flush 5-10 mL  5-10 mL IntraVENous PRN    acetaminophen (TYLENOL) tablet 650 mg  650 mg Oral Q6H PRN    ondansetron (ZOFRAN) injection 4 mg  4 mg IntraVENous Q4H PRN    hydrALAZINE (APRESOLINE) 20 mg/mL injection 10 mg  10 mg IntraVENous Q6H PRN    apixaban (ELIQUIS) tablet 10 mg  10 mg Oral BID    [START ON 5/29/2018] apixaban (ELIQUIS) tablet 5 mg  5 mg Oral BID      Allergies   Allergen Reactions    Levaquin [Levofloxacin] Rash        Review of Systems: A complete review of systems was obtained, negative except as described above. Physical Exam:     Visit Vitals    /74 (BP 1 Location: Left arm, BP Patient Position: At rest;Sitting)    Pulse 68    Temp 98.7 °F (37.1 °C)    Resp 20    Ht 6' (1.829 m)    Wt 201 lb 4.5 oz (91.3 kg)    SpO2 98%    BMI 27.3 kg/m2     ECOG PS: 1  General: No distress, sitting up in bed, on oxygen via NC  Eyes: anicteric sclerae  HENT: Atraumatic, OP clear  Neck: Supple  Respiratory: CTAB, normal respiratory effort  CV: Normal rate, regular rhythm  GI: Soft, nontender, nondistended  MS: Gait not observed  Skin: No rashes, ecchymoses, or petechiae. Normal temperature and texture.   Psych: Alert, oriented, appropriate affect    Results:     Lab Results   Component Value Date/Time    WBC 6.1 05/24/2018 03:33 AM    HGB 13.3 05/24/2018 03:33 AM    HCT 41.1 05/24/2018 03:33 AM    PLATELET 472 29/73/8789 03:33 AM    MCV 80.6 05/24/2018 03:33 AM    ABS. NEUTROPHILS 2.8 05/23/2018 03:25 AM     Lab Results   Component Value Date/Time    Sodium 136 05/23/2018 03:25 AM    Potassium 3.4 (L) 05/23/2018 03:25 AM    Chloride 101 05/23/2018 03:25 AM    CO2 26 05/23/2018 03:25 AM    Glucose 105 (H) 05/23/2018 03:25 AM    BUN 13 05/23/2018 03:25 AM    Creatinine 1.04 05/23/2018 03:25 AM    GFR est AA >60 05/23/2018 03:25 AM    GFR est non-AA >60 05/23/2018 03:25 AM    Calcium 9.2 05/23/2018 03:25 AM     Lab Results   Component Value Date/Time    Bilirubin, total 1.1 (H) 05/23/2018 03:25 AM    ALT (SGPT) 25 05/23/2018 03:25 AM    AST (SGOT) 23 05/23/2018 03:25 AM    Alk. phosphatase 68 05/23/2018 03:25 AM    Protein, total 7.7 05/23/2018 03:25 AM    Albumin 3.0 (L) 05/23/2018 03:25 AM    Globulin 4.7 (H) 05/23/2018 03:25 AM     5/22/18 CTA Chest:  IMPRESSION:   1. There are acute bilateral pulmonary emboli to the right and left pulmonary  artery level. There are no signs of right heart strain. 2. Lung opacities in the peripheral right middle lobe and right lower lobe may  represent atelectasis or infarcts. 5/22/18 Doppler bilateral lower extremities:  IMPRESSION:  Bilateral lower extremity vein thrombosis, involving the  popliteal on the right and the popliteal and posterior tibial on the  left. The ultrasound appearance is more consistent with an acute than   a chronic process. Records reviewed and summarized above. Pathology report(s) reviewed above. Radiology report(s) reviewed above. Assessment/Plan:   1) Bilateral PE and lower extremity VTE. Patient has a history of recurrent unprovoked VTE (2009 and 2012). Recommend life-long anticoagulation at this time. Reviewed with patient today and he is agreeable to this.  He received 1 dose of enoxaparin and has been started on apixaban per hospitalist.   Reviewed hx and present event with pt today. Pt understands need for life long anticoagulation. He will need outpatient follow-up with hematology after discharge. And likely will see Dr Carmelo Mar as he has seen him before. 2) Shortness of breath. Related to PE,improving per patient. 3) Chest pain. Improving as well. Continue anticoagulation. I appreciate the opportunity to participate in [facility]. Please call with any questions.   Pt seen today in conjunction with MILENA Austin    Signed By: Jong Medellin DO

## 2018-05-23 NOTE — PROGRESS NOTES
Besides report given to Jef Sierra RN, including SBAR, Kardex, results, and course to date. Questions asked/answered. Jef Sierra RN assumed nursing care.

## 2018-05-23 NOTE — PROGRESS NOTES
Hospitalist Progress Note  Harpal Mata MD  Office: [phone]        Date of Service:  2018  NAME:  Katherin Aparicio  :  1960  MRN:  [id number]      Admission Summary:   Katherin Aparicio is a 59 y.o. male with h/o RLE DVT, BPH, gout, and HTN who presents to the ED from work with SOB with exertion. Patient is a  and noted that he has been getting SOB when walking up stairs and walking back to his desk at his workplace. Symptoms started about 1 week ago. He reports some mild chest pressure and lightheadedness but no leg swelling, palpitations, cough, HA, dizziness, fainting spells, f/c/n/v, recent weight changes, abdominal pains. He has a h/o RLE DVT in  and  and was on warfarin x6 months each time per the chart. (Patient and his wife report he had only 1 DVT in  and was on warfarin x1 month.) He was seen by Hematologist Dr Sonali Oneil in  who found he had a mildly elevated Factor VIII level, but patient reportedly did not want to be on prolonged anticoagulation at that time. Patient was taking ASA which he stopped when he started indomethacin for a gout flare. Patient reports he has been working long hours at work, not getting up that much from his desk, and has not exercised as much as he used to. Interval history / Subjective:     F/u arturo PE. Still has sig pain on right side with sob. This AM had significant pain. Got EKG and trops.      Assessment & Plan:         Bilateral pulmonary emboli with possible pulmonary infarcts (POA) -   - pt symptomatic but hemodynamically stable;  - s/p enoxaparin 100mg SC x1 in ED  - Started on Eliquis  - Pending ECHO results  - Hematology consulted.       Hypertension, uncontrolled (POA) - pt with reported \"white-coat HTN\"  - resume home meds and add hydralazine IV prn       Idiopathic gout - no acute flare up currently  - stopped indomethacin due to increased risk of bleeding with apixaban on admission  - may consider starting allopurinol       Bilateral LE DVTs (POA) - pt with h/o recurrent RLE DVT but has not been on AC in a long time  - plan as above       Abnormal ECG (POA) - likely junctional rhythm   - repeat ECG in the AM  - check TTE  - right sides pleuritic CP sec to infarction.       Near syncope (POA) - witnessed in the ED  - likely due to b/l PE with possible lung infarcts       Elevated factor VIII level (POA) - unclear significance  - has been seen by Dr Mira Alexander, Hematologist, in the past  Code status: Full  DVT prophylaxis: on [address] discussed with: Patient/Family  Disposition: Home w/Family and TBD, in 1-2 days with oral anticoagulation once his CP, brathing better with heme recs. Hospital Problems  Date Reviewed: 5/22/2018          Codes Class Noted POA    DVT, bilateral lower limbs ([address] ICD-10-CM: I82.403  ICD-9-CM: 453.40  5/22/2018 Yes        Abnormal ECG ICD-10-CM: R94.31  ICD-9-CM: 794.31  5/22/2018 Yes        Near syncope ICD-10-CM: R55  ICD-9-CM: 780.2  5/22/2018 Yes        * (Principal)Bilateral pulmonary embolism (HCC) ICD-10-CM: I26.99  ICD-9-CM: 415.19  5/22/2018 Yes        Elevated factor VIII level ICD-10-CM: R79.1  ICD-9-CM: 790.92  5/22/2018 Unknown        Hypertension, uncontrolled ICD-10-CM: I10  ICD-9-CM: 401.9  8/15/2017 Yes        Idiopathic gout ICD-10-CM: M10.00  ICD-9-CM: 274.9  8/15/2017 Yes                Review of Systems:   A comprehensive review of systems was negative except for that written in the HPI. Vital Signs:    Last 24hrs VS reviewed since prior progress note.  Most recent are:  Visit Vitals    /69    Pulse 64    Temp 98.3 °F (36.8 °C)    Resp 22    Ht 6' (1.829 m)    Wt 91.3 kg (201 lb 4.5 oz)    SpO2 95%    BMI 27.3 kg/m2       No intake or output data in the 24 hours ending 05/23/18 8248     Physical Examination:             Constitutional:  No acute distress, cooperative, pleasant    ENT:  Oral mucous moist, oropharynx benign. Neck supple,    Resp:  CTA bilaterally. No wheezing/rhonchi/rales, right sided CP on deep breathing. CV:  Regular rhythm, normal rate, no murmurs, gallops, rubs    GI:  Soft, non distended, non tender. normoactive bowel sounds, no hepatosplenomegaly     Musculoskeletal:  No edema, warm, 2+ pulses throughout    Neurologic:  Moves all extremities. AAOx3, CN II-XII reviewed            Data Review:    Review and/or order of clinical lab test  Review and/or order of tests in the radiology section of CPT  Review and/or order of tests in the medicine section of [facility]      Labs:     Recent Labs      05/23/18   0325 05/22/18 0812   WBC  5.7  6.4   HGB  13.1  13.4   HCT  40.2  42.3   PLT  237  232     Recent Labs      05/23/18   0325 05/22/18   0812   NA  136  139   K  3.4*  3.3*   CL  101  102   CO2  26  32   BUN  13  16   CREA  1.04  1.25   GLU  105*  83   CA  9.2  9.2     Recent Labs      05/23/18   0325  05/22/18   0812   SGOT  23  28   ALT  25  27   AP  68  75   TBILI  1.1*  0.9   TP  7.7  8.2   ALB  3.0*  3.4*   GLOB  4.7*  4.8*     No results for input(s): INR, PTP, APTT in the last 72 hours. No lab exists for component: INREXT   No results for input(s): FE, TIBC, PSAT, FERR in the last 72 hours. No results found for: FOL, RBCF   No results for input(s): PH, PCO2, PO2 in the last 72 hours.   Recent Labs      05/23/18   0425  05/22/18 0812   TROIQ  <0.04  <0.04     Lab Results   Component Value Date/Time    Cholesterol, total 194 03/16/2018 12:30 PM    HDL Cholesterol 50 03/16/2018 12:30 PM    LDL, calculated 123 (H) 03/16/2018 12:30 PM    Triglyceride 104 03/16/2018 12:30 PM     No results found for: GLUCPOC  No results found for: COLOR, APPRN, SPGRU, REFSG, CELI, PROTU, GLUCU, KETU, BILU, UROU, JAMILAH, LEUKU, GLUKE, EPSU, BACTU, WBCU, RBCU, CASTS, UCRY      Medications Reviewed:     Current Facility-Administered Medications   Medication Dose Route Frequency    chlorthalidone (HYGROTEN) tablet 25 mg  25 mg Oral DAILY    cholecalciferol (VITAMIN D3) tablet 1,000 Units  1,000 Units Oral DAILY    sodium chloride (NS) flush 5-10 mL  5-10 mL IntraVENous Q8H    sodium chloride (NS) flush 5-10 mL  5-10 mL IntraVENous PRN    acetaminophen (TYLENOL) tablet 650 mg  650 mg Oral Q6H PRN    ondansetron (ZOFRAN) injection 4 mg  4 mg IntraVENous Q4H PRN    hydrALAZINE (APRESOLINE) 20 mg/mL injection 10 mg  10 mg IntraVENous Q6H PRN    apixaban (ELIQUIS) tablet 10 mg  10 mg Oral BID    [START ON 5/29/2018] apixaban (ELIQUIS) tablet 5 mg  5 mg Oral BID     ______________________________________________________________________  EXPECTED LENGTH OF STAY: 4d 0h  ACTUAL LENGTH OF STAY:          1                 Mariam Delacruz MD

## 2018-05-24 VITALS
BODY MASS INDEX: 27.26 KG/M2 | HEART RATE: 63 BPM | WEIGHT: 201.28 LBS | HEIGHT: 72 IN | DIASTOLIC BLOOD PRESSURE: 69 MMHG | RESPIRATION RATE: 20 BRPM | TEMPERATURE: 98.4 F | SYSTOLIC BLOOD PRESSURE: 117 MMHG | OXYGEN SATURATION: 97 %

## 2018-05-24 LAB
ERYTHROCYTE [DISTWIDTH] IN BLOOD BY AUTOMATED COUNT: 14.1 % (ref 11.5–14.5)
HCT VFR BLD AUTO: 41.1 % (ref 36.6–50.3)
HGB BLD-MCNC: 13.3 G/DL (ref 12.1–17)
MCH RBC QN AUTO: 26.1 PG (ref 26–34)
MCHC RBC AUTO-ENTMCNC: 32.4 G/DL (ref 30–36.5)
MCV RBC AUTO: 80.6 FL (ref 80–99)
NRBC # BLD: 0 K/UL (ref 0–0.01)
NRBC BLD-RTO: 0 PER 100 WBC
PLATELET # BLD AUTO: 255 K/UL (ref 150–400)
PMV BLD AUTO: 9.4 FL (ref 8.9–12.9)
RBC # BLD AUTO: 5.1 M/UL (ref 4.1–5.7)
WBC # BLD AUTO: 6.1 K/UL (ref 4.1–11.1)

## 2018-05-24 PROCEDURE — 36415 COLL VENOUS BLD VENIPUNCTURE: CPT | Performed by: HOSPITALIST

## 2018-05-24 PROCEDURE — 85027 COMPLETE CBC AUTOMATED: CPT | Performed by: HOSPITALIST

## 2018-05-24 PROCEDURE — 74011250637 HC RX REV CODE- 250/637: Performed by: INTERNAL MEDICINE

## 2018-05-24 RX ADMIN — VITAMIN D, TAB 1000IU (100/BT) 1000 UNITS: 25 TAB at 09:31

## 2018-05-24 RX ADMIN — APIXABAN 10 MG: 5 TABLET, FILM COATED ORAL at 09:31

## 2018-05-24 RX ADMIN — CHLORTHALIDONE 25 MG: 25 TABLET ORAL at 09:31

## 2018-05-24 RX ADMIN — Medication 10 ML: at 06:40

## 2018-05-24 NOTE — DISCHARGE INSTRUCTIONS
Patient Discharge Instructions    Blayne Cox / [id number] : 1960    Admitted 2018 Discharged: 2018 9:49 AM     ACUTE DIAGNOSES:  DVT, bilateral lower limbs ([facility]    CHRONIC MEDICAL DIAGNOSES:  Problem List as of 2018  Date Reviewed: 2018          Codes Class Noted - Resolved    DVT, bilateral lower limbs ([facility] ICD-10-CM: I82.403  ICD-9-CM: 453.40  2018 - Present        Abnormal ECG ICD-10-CM: R94.31  ICD-9-CM: 794.31  2018 - Present        Near syncope ICD-10-CM: R55  ICD-9-CM: 780.2  2018 - Present        * (Principal)Bilateral pulmonary embolism ([facility] ICD-10-CM: I26.99  ICD-9-CM: 415.19  2018 - Present        Elevated factor VIII level ICD-10-CM: R79.1  ICD-9-CM: 790.92  2018 - Present        Dilated aortic root (HCC) ICD-10-CM: I77.810  ICD-9-CM: 447.71  2017 - Present        Hypertension, uncontrolled ICD-10-CM: I10  ICD-9-CM: 401.9  8/15/2017 - Present        White coat syndrome with diagnosis of hypertension ICD-10-CM: I10  ICD-9-CM: 401.9  8/15/2017 - Present        Idiopathic gout ICD-10-CM: M10.00  ICD-9-CM: 274.9  8/15/2017 - Present        Palpitations ICD-10-CM: R00.2  ICD-9-CM: 785.1  2017 - Present        ED (erectile dysfunction) ICD-10-CM: N52.9  ICD-9-CM: 607.84  2013 - Present        Thromboembolus ([facility] ICD-10-CM: I74.9  ICD-9-CM: 444.9  Unknown - Present    Overview Addendum 2014  6:31 AM by Gianni Horton      & .  elevated D dimer. BPH (benign prostatic hyperplasia) ICD-10-CM: N40.0  ICD-9-CM: 600.00  10/11/2013 - Present    Overview Signed 8/15/2017  8:17 AM by Melissa Bruce      biopsy negative.              RESOLVED: White coat hypertension ICD-10-CM: [id number]  ICD-9-CM: Sherron Ashwin  2016 - 3/16/2018        RESOLVED: Hypertension ICD-10-CM: I10  ICD-9-CM: 401.9  Unknown - 8/15/2017              DISCHARGE MEDICATIONS:           · It is important that you take the medication exactly as they are prescribed. · Keep your medication in the bottles provided by the pharmacist and keep a list of the medication names, dosages, and times to be taken in your wallet. · Do not take other medications without consulting your doctor. DIET:  Regular Diet    ACTIVITY: Activity as tolerated    ADDITIONAL INFORMATION: If you experience any of the following symptoms then please call your primary care physician or return to the emergency room if you cannot get hold of your doctor: Fever, chills, nausea, vomiting, diarrhea, change in mentation, falling, bleeding, shortness of breath. FOLLOW UP CARE:  Dr. Ritter primary care provider on file. Arnold Henderson Please call and set up an appointment to see them in 2 weeks. Call pcp  at the number below and set up an appointment to see them in 1 week: Information obtained by :  I understand that if any problems occur once I am at home I am to contact my physician. I understand and acknowledge receipt of the instructions indicated above. Physician's or R.N.'s Signature                                                                  Date/Time                                                                                                                                              Patient or Representative Signature                                                          Date/Time      Synack[employer] Activation    Thank you for requesting access to [employer]â„¢. Please follow the instructions below to securely access and download your online medical record. [employer]â„¢ allows you to send messages to your doctor, view your test results, renew your prescriptions, schedule appointments, and more. How Do I Sign Up? 1. In your internet browser, go to [email or web address]. 2. Click on the First Time User?  Click Here link in the Sign In box. You will see the New Member Sign Up page. 3. Enter your [employer] Access Code exactly as it appears below. You will not need to use this code after youve completed the sign-up process. If you do not sign up before the expiration date, you must request a new code. MyChart Access Code: Activation code not generated  Current [employer] Status: Active (This is the date your [employer] access code will )    4. Enter the last four digits of your Social Security Number (xxxx) and Date of Birth (mm/dd/yyyy) as indicated and click Submit. You will be taken to the next sign-up page. 5. Create a [employer] ID. This will be your [employer] login ID and cannot be changed, so think of one that is secure and easy to remember. 6. Create a [employer] password. You can change your password at any time. 7. Enter your Password Reset Question and Answer. This can be used at a later time if you forget your password. 8. Enter your e-mail address. You will receive e-mail notification when new information is available in [address] 9. Click Sign Up. You can now view and download portions of your medical record. 10. Click the Download Summary menu link to download a portable copy of your medical information. Additional Information    If you have questions, please visit the Frequently Asked Questions section of the [employer] website at [email or web address] Remember, [employer] is NOT to be used for urgent needs. For medical emergencies, dial 911.

## 2018-05-24 NOTE — DISCHARGE SUMMARY
[address]    Juana Daily.  MR#: [id number]  : 1960  ACCOUNT #: [de-identified]   ADMIT DATE: 2018  DISCHARGE DATE: 2018    DIAGNOSIS ON ADMISSION:  Bilateral PE.    DIAGNOSES ON DISCHARGE:  Bilateral PE and bilateral DVTs. HOSPITAL COURSE:  The patient is a 25-year-old gentleman who has previous history significant for DVT, hypertension and BPH, presented to the emergency room due to shortness of breath with exertion. Patient is a .  He had previous history significant for DVTs in  and , but did not agree with lifelong anticoagulation. In the emergency room, further workup included a CTA of the chest which revealed bilateral pulmonary emboli to the right and left pulmonary artery. No signs of heart strain. Lung opacities in the peripheral right middle lobe and right lower lobe may represent atelectasis or infarct. The patient also had a Doppler study done, which showed bilateral lower extremity venous thrombosis involving the popliteal on the right and popliteal and posterior tibial on the left, suggestive of acute DVT. Patient was given one dose of Lovenox and was admitted. He was started on novel oral anticoagulants. The patient was subsequently seen by hematologist, Dr. Elvi Hurtado who also agreed with discharging him on lifelong anticoagulation with apixaban. Patient had an echocardiogram done which showed an ejection fraction of 55-60% with no regional wall motion abnormality. The patient is stable, wants to go home. He is discharged home on apixaban 10 mg p.o. b.i.d. for 11 more doses to make a total of 14 doses. After that, he will be getting apixaban 5 mg b.i.d. He is supposed to follow up with his PCP in a week.       MD RENE Thomas/TN  D: 2018 09:53     T: 2018 13:26  JOB #: [id number]

## 2018-05-31 ENCOUNTER — TELEPHONE (OUTPATIENT)
Dept: ONCOLOGY | Age: 58
End: 2018-05-31

## 2018-05-31 NOTE — TELEPHONE ENCOUNTER
Pt called back returning my call. He stated he is actually closer to [facility] so I provided pt with [facility] phone number and pt stated he will call their office. No further questions or concerns.

## 2018-05-31 NOTE — TELEPHONE ENCOUNTER
Called pt on home number and the phone is disconnected. Called pt on mobile number and left a voicemail requesting a return call to schedule a hospital fu with Dr. Phan Sanchez in a few weeks per Kitty Barker.

## 2018-06-07 ENCOUNTER — OFFICE VISIT (OUTPATIENT)
Dept: INTERNAL MEDICINE CLINIC | Age: 58
End: 2018-06-07

## 2018-06-07 VITALS
HEART RATE: 66 BPM | WEIGHT: 202 LBS | DIASTOLIC BLOOD PRESSURE: 84 MMHG | SYSTOLIC BLOOD PRESSURE: 144 MMHG | OXYGEN SATURATION: 98 % | BODY MASS INDEX: 27.36 KG/M2 | TEMPERATURE: 98.7 F | HEIGHT: 72 IN | RESPIRATION RATE: 18 BRPM

## 2018-06-07 DIAGNOSIS — I82.433 ACUTE DEEP VEIN THROMBOSIS (DVT) OF POPLITEAL VEIN OF BOTH LOWER EXTREMITIES (HCC): ICD-10-CM

## 2018-06-07 DIAGNOSIS — R79.1 ELEVATED FACTOR VIII LEVEL: Primary | ICD-10-CM

## 2018-06-07 DIAGNOSIS — I74.9 THROMBOEMBOLUS (HCC): ICD-10-CM

## 2018-06-07 DIAGNOSIS — I10 HYPERTENSION, UNCONTROLLED: ICD-10-CM

## 2018-06-07 DIAGNOSIS — I77.810 DILATED AORTIC ROOT (HCC): ICD-10-CM

## 2018-06-07 DIAGNOSIS — I26.99 BILATERAL PULMONARY EMBOLISM (HCC): ICD-10-CM

## 2018-06-07 DIAGNOSIS — M10.00 IDIOPATHIC GOUT, UNSPECIFIED CHRONICITY, UNSPECIFIED SITE: ICD-10-CM

## 2018-06-07 RX ORDER — INDOMETHACIN 50 MG/1
CAPSULE ORAL
COMMUNITY
Start: 2018-05-16 | End: 2018-06-07

## 2018-06-07 RX ORDER — AMLODIPINE AND VALSARTAN 5; 160 MG/1; MG/1
1 TABLET ORAL DAILY
Qty: 30 TAB | Refills: 5 | Status: SHIPPED | OUTPATIENT
Start: 2018-06-07 | End: 2018-06-27

## 2018-06-07 NOTE — MR AVS SNAPSHOT
[address] [place] [address] [address] [facility] 
[phone] Patient: Sukhjinder Rios MRN: [id number] Visit Information Date & Time Provider Department Dept. Phone Encounter #  
 6/7/2018  1:45 PM Eduardo Aviles PA-C [facility] [phone] [id number] Your Appointments 6/11/2018  2:15 PM  
Any with Gisell Montes De Oca  [facility] Appt Note: follow up from hospital visit per Koki Ayala message 05/31/2018 [address] [facility] [address] [facility] [address]  
[phone]  
  
   
 [address] [address] [facility] [address] Upcoming Health Maintenance Date Due Hepatitis C Screening 1960 DTaP/Tdap/Td series (1 - Tdap) 12/22/1979 FOBT Q 1 YEAR AGE 50-75 12/22/2008 Allergies as of 6/7/2018  Review Complete On: 6/7/2018 By: Eduardo Aviles PA-C Severity Noted Reaction Type Reactions Levaquin [Levofloxacin]  02/25/2015    Rash Current Immunizations  Reviewed on 5/22/2018 No immunizations on file. Not reviewed this visit Vitals BP Pulse Temp Resp Height(growth percentile) Weight(growth percentile) 148/84 (BP 1 Location: Left arm, BP Patient Position: At rest) 66 98.7 °F (37.1 °C) (Oral) 18 6' (1.829 m) 202 lb (91.6 kg) SpO2 BMI Smoking Status 98% 27.4 kg/m2 Never Smoker BMI and BSA Data Body Mass Index Body Surface Area  
 27.4 kg/m 2 2.16 m 2 Preferred Pharmacy Pharmacy Name Phone [id number] [place] [address] [phone] Your Updated Medication List  
  
   
This list is accurate as of 6/7/18  2:17 PM.  Always use your most recent med list.  
  
  
  
  
 apixaban 5 mg tablet Commonly known as:  Diane Lin Take 2 Tabs by mouth two (2) times a day. chlorthalidone 25 mg tablet Commonly known as:  Gregor Yampa Take 1 Tab by mouth daily. cholecalciferol 1,000 unit Cap Commonly known as:  VITAMIN D3 Take 1,000 Units by mouth daily. Introducing [facility] & HEALTH SERVICES! Dear Cheryl Newell: Thank you for requesting a [employer] account. Our records indicate that you already have an active [employer] account. You can access your account anytime at [email or web address] Did you know that you can access your hospital and ER discharge instructions at any time in [employer]? You can also review all of your test results from your hospital stay or ER visit. Additional Information If you have questions, please visit the Frequently Asked Questions section of the [employer] website at [email or web address] Remember, [employer] is NOT to be used for urgent needs. For medical emergencies, dial 911. Now available from your iPhone and Android! Please provide this summary of care documentation to your next provider. If you have any questions after today's visit, please call [phone].

## 2018-06-07 NOTE — PROGRESS NOTES
1. Have you been to the ER, urgent care clinic since your last visit? Hospitalized since your last visit?see below    2. Have you seen or consulted any other health care providers outside of the [address] [place] [address] since your last visit? Include any pap smears or colon screening. No    Chief Complaint   Patient presents with    Follow-up     blood clot and gout in right foot.  went to [facility] on 5/22 thru 5/24     Not fasting

## 2018-06-07 NOTE — PROGRESS NOTES
HISTORY OF PRESENT ILLNESS  Zohra Pardo is a 59 y.o. male. Chief Complaint   Patient presents with    Follow-up     blood clot and gout in right foot. went to [facility] [address] [facility] [address] on 5/22 thru 5/24    Other     room 1     Health Maintenance Due   Topic Date Due    Hepatitis C Screening  1960    DTaP/Tdap/Td series (1 - Tdap) 12/22/1979    FOBT Q 1 YEAR AGE 50-75  12/22/2008     HPI   First episode 10 years ago. DVT in calf - Lovenox shots then Coumadin for short period of time. Then started on 81 mg ASA. Stopped ASA due to gout. 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 Exercising regularly now    Gout R foot:  No results found for: Kira Gant    Started on Eliquis    HTN Uncontrolled at first check:   BP Readings from Last 1 Encounters:   06/07/18 148/84     Controlled at recheck:  BP Readings from Last 3 Encounters:   06/07/18 148/84   05/24/18 117/69   03/16/18 134/88     BP at home: 120s/70s at home. Previously on Norvasc - not controlled on this. Review of Systems   Respiratory: Negative for shortness of breath. Cardiovascular: Negative for chest pain and palpitations. Neurological: Negative for dizziness, loss of consciousness and weakness. Physical Exam   Constitutional: He is oriented to person, place, and time. He appears well-developed and well-nourished. No distress. HENT:   Head: Normocephalic and atraumatic. Neck: Neck supple. No JVD present. No bruit bilateral carotid arteries. Cardiovascular: Normal rate, regular rhythm and normal heart sounds. Pulmonary/Chest: Effort normal and breath sounds normal. No respiratory distress. Musculoskeletal: He exhibits no edema. Neurological: He is alert and oriented to person, place, and time. Skin: Skin is warm and dry. Psychiatric: He has a normal mood and affect.  His behavior is normal. Judgment and thought content normal. Nursing note and vitals reviewed. ASSESSMENT and PLAN    ICD-10-CM ICD-9-CM    1. 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 Continue Eliquis   2. Bilateral pulmonary embolism (HCC) I26.99 415.19 See above   3. Acute deep vein thrombosis (DVT) of popliteal vein of both lower extremities (HCC) I82.433 453.41 See above   4. Dilated aortic root (HCC) I77.810 447.71 Consulted with cardiology in hospital. Follow up q 1-2 years. 5. Idiopathic gout, unspecified chronicity, unspecified site M10.00 274.9 Stop Chlorthalidone. Change Rx to Exforge. 6. Hypertension, uncontrolled I10 401.9 Start amLODIPine-valsartan (EXFORGE) 5-160 mg per tablet   7.  Thromboembolus ([facility] [address] I74.9 444.9 See above

## 2018-06-10 ENCOUNTER — DOCUMENTATION ONLY (OUTPATIENT)
Dept: ONCOLOGY | Age: 58
End: 2018-06-10

## 2018-06-11 ENCOUNTER — OFFICE VISIT (OUTPATIENT)
Dept: ONCOLOGY | Age: 58
End: 2018-06-11

## 2018-06-11 VITALS
TEMPERATURE: 98.6 F | SYSTOLIC BLOOD PRESSURE: 151 MMHG | RESPIRATION RATE: 16 BRPM | DIASTOLIC BLOOD PRESSURE: 87 MMHG | OXYGEN SATURATION: 98 % | HEIGHT: 73 IN | WEIGHT: 202 LBS | BODY MASS INDEX: 26.77 KG/M2 | HEART RATE: 64 BPM

## 2018-06-11 DIAGNOSIS — I26.99 BILATERAL PULMONARY EMBOLISM (HCC): ICD-10-CM

## 2018-06-11 DIAGNOSIS — I82.433 ACUTE DEEP VEIN THROMBOSIS (DVT) OF POPLITEAL VEIN OF BOTH LOWER EXTREMITIES (HCC): Primary | ICD-10-CM

## 2018-06-11 RX ORDER — CHLORTHALIDONE 25 MG/1
TABLET ORAL
Refills: 1 | COMMUNITY
Start: 2018-05-11 | End: 2018-07-17 | Stop reason: SDUPTHER

## 2018-06-11 NOTE — PROGRESS NOTES
Dalia koroma  Note from dr currie in [address] [place] [address] 2014  But whatever pt wants

## 2018-06-11 NOTE — PROGRESS NOTES
[facility] [place] at [address], [address] [place]: [phone]  F: [phone]    Reason for Visit:   Marquis Best is a 59 y.o. male who is seen in consultation at the request of Dr. Rebecca Alonso for evaluation of DVT/PE. Treatment History:   eliquis    History of Present Illness:     Mr. Abe Wright is a 59 y.o. male who is currently admitted with bilateral lower extremity DVT and PE. He presented to the ED yesterday with shortness of breath. CTA showed acute bilateral pulmonary emboli to the right and left pulmonary artery level, no signs of right heart strain. Lung opacities noted in the right middle and lower lobe that may represent either atelectasis or infarcts. Doppler of the lower extremities showed bilateral DVT's as well. He received 1 dose of enoxaparin in the ED (100 mg). He then started on apixaban on 5/22/18. He reports breathing has been better this afternoon but he does remain on oxygen. His chest pain is less. He has been less active and sitting more at work recently.     Mr. Abe Wright was seen by Dr. 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 He was initially found to have an unprovoked right DVT in 2009, treated with 6 months of Coumadin. Then in 2012, diagnosed with another right lower extremity DVT, initially treated with Lovenox and bridged to Coumadin. He completed 6 months of Coumadin at that time as well. The appearance of the VTE in 2012 on doppler was potentially chronic in appearance, so it was difficult to determine if this was a new clot vs. chronic thrombosis. In 2014, he was found to have an elevated Factor VIII level and was sent to Dr. Benson Corrales for evaluation. Recommendation was for life-long anticoagulation at the time based on history of unprovoked VTE, but patient was hesitant to this and thus was started on aspirin 81 mg daily. Interim Hx:  Pt seen today for office fu after hospital consult for DVT/ PE on eliquis.  Pt has seen PCP since hospital.   Pt is tolerating eliquis. Walks at work and feeling not SOB. Past Medical History:   Diagnosis Date    BPH (benign prostatic hyperplasia)     Gout     Hypertension     Thromboembolus ([address]     2009 & 2012      Past Surgical History:   Procedure Laterality Date    HX COLONOSCOPY  2012      Social History   Substance Use Topics    Smoking status: Never Smoker    Smokeless tobacco: Never Used    Alcohol use No      Family History   Problem Relation Age of Onset    Cancer Mother      multiple myeloma    Diabetes Mother     Hypertension Mother     Stroke Mother     Cancer Father      pancreatic    Heart Disease Father     Hypertension Father      Current Outpatient Prescriptions   Medication Sig    chlorthalidone (HYGROTEN) 25 mg tablet TAKE 1 TABLET BY MOUTH EVERY DAY    apixaban (ELIQUIS) 5 mg tablet Take 2 Tabs by mouth two (2) times a day.  Cholecalciferol, Vitamin D3, 1,000 unit cap Take 1,000 Units by mouth daily.  amLODIPine-valsartan (EXFORGE) 5-160 mg per tablet Take 1 Tab by mouth daily. No current facility-administered medications for this visit. Allergies   Allergen Reactions    Levaquin [Levofloxacin] Rash        Review of Systems: A complete review of systems was obtained, negative except as described above. Physical Exam:     Visit Vitals    /87    Pulse 64    Temp 98.6 °F (37 °C) (Oral)    Resp 16    Ht 6' 1\" (1.854 m)    Wt 202 lb (91.6 kg)    SpO2 98%    BMI 26.65 kg/m2     ECOG PS: 0  General: No distress  Eyes: PERRLA, anicteric sclerae  HENT: Atraumatic, OP clear  Neck: Supple  Respiratory: CTAB, normal respiratory effort  CV: Normal rate, regular rhythm, no murmurs, no peripheral edema  GI: Soft, nontender, nondistended  MS: Normal gait and station. Skin: No rashes, ecchymoses, or petechiae. Normal temperature, turgor, and texture.   Psych: Alert, oriented, appropriate affect, normal judgment/insight    Results:     Lab Results   Component Value Date/Time    WBC 6.1 05/24/2018 03:33 AM    HGB 13.3 05/24/2018 03:33 AM    HCT 41.1 05/24/2018 03:33 AM    PLATELET 724 79/09/3971 03:33 AM    MCV 80.6 05/24/2018 03:33 AM    ABS. NEUTROPHILS 2.8 05/23/2018 03:25 AM     Lab Results   Component Value Date/Time    Sodium 136 05/23/2018 03:25 AM    Potassium 3.4 (L) 05/23/2018 03:25 AM    Chloride 101 05/23/2018 03:25 AM    CO2 26 05/23/2018 03:25 AM    Glucose 105 (H) 05/23/2018 03:25 AM    BUN 13 05/23/2018 03:25 AM    Creatinine 1.04 05/23/2018 03:25 AM    GFR est AA >60 05/23/2018 03:25 AM    GFR est non-AA >60 05/23/2018 03:25 AM    Calcium 9.2 05/23/2018 03:25 AM     Lab Results   Component Value Date/Time    Bilirubin, total 1.1 (H) 05/23/2018 03:25 AM    ALT (SGPT) 25 05/23/2018 03:25 AM    AST (SGOT) 23 05/23/2018 03:25 AM    Alk. phosphatase 68 05/23/2018 03:25 AM    Protein, total 7.7 05/23/2018 03:25 AM    Albumin 3.0 (L) 05/23/2018 03:25 AM    Globulin 4.7 (H) 05/23/2018 03:25 AM       CT Results (most recent):    Results from Hospital Encounter encounter on 05/22/18   CTA CHEST W OR W WO CONT   Narrative EXAM:  CTA CHEST W OR W WO CONT    INDICATION:  Shortness of breath. COMPARISON: Chest radiographs 5/22/2018    TECHNIQUE: Helical thin section chest CT following uneventful intravenous  administration of nonionic contrast according to departmental PE protocol. Coronal and sagittal reformats were performed. 3D/MIP post processing was  performed. CT dose reduction was achieved through use of a standardized protocol  tailored for this examination and automatic exposure control for dose  modulation. Adaptive statistical iterative reconstruction (ASIR) was utilized. FINDINGS: This is a good quality study for the evaluation of pulmonary embolism  to the first subsegmental arterial level. There are acute bilateral pulmonary  emboli to the right and left pulmonary artery level. The visualized thyroid gland is unremarkable. The aorta and main pulmonary  artery are normal in caliber. Cardiac size is within normal limits. No  pericardial effusion. No lymphadenopathy by imaging size criteria. There are peripheral opacities in the lateral aspect of the right middle lobe  and to a lesser extent the peripheral right lower lobe. There is no lung nodule  or mass. No pleural effusion or pneumothorax. Central airways are unremarkable. Limited images of the upper abdomen are within normal limits. The bony  structures are age-appropriate         Impression IMPRESSION:   1. There are acute bilateral pulmonary emboli to the right and left pulmonary  artery level. There are no signs of right heart strain. 2. Lung opacities in the peripheral right middle lobe and right lower lobe may  represent atelectasis or infarcts. The findings were discussed with Dr. Sunshine Salter on 5/22/2018 at 1340 hours by Dr. Gisell Monroy. [id number]              Records reviewed and summarized above. Pathology report(s) reviewed above. Radiology report(s) reviewed above. Assessment/PLAN:     1) Bilateral PE and lower extremity VTE 5/18. Patient has a history of recurrent unprovoked VTE (2009 and 2012). Recommend life-long anticoagulation at this time. Reviewed with patient today and he is agreeable to this. Reviewed hx and present event with pt today. Pt understands need for life long anticoagulation. Pt is tolerating eliquis. Can get from PCP and f/u with them primarily. Here as needed. Can do f/u CTA/ultrasound in 3-6 months as pt wants.    2) Shortness of breath/  Chest pain. Improved as well. Continue anticoagulation. 3) HTN     Call if questions  Fu here in 6 months. I appreciate the opportunity to participate in Mr. Cristino Higgins care.     Signed By: Cullen Ordonez DO

## 2018-06-20 ENCOUNTER — OFFICE VISIT (OUTPATIENT)
Dept: INTERNAL MEDICINE CLINIC | Age: 58
End: 2018-06-20

## 2018-06-20 VITALS
BODY MASS INDEX: 28.02 KG/M2 | TEMPERATURE: 98 F | HEIGHT: 73 IN | HEART RATE: 57 BPM | WEIGHT: 211.4 LBS | DIASTOLIC BLOOD PRESSURE: 90 MMHG | OXYGEN SATURATION: 98 % | SYSTOLIC BLOOD PRESSURE: 157 MMHG | RESPIRATION RATE: 16 BRPM

## 2018-06-20 DIAGNOSIS — R60.9 EDEMA, UNSPECIFIED TYPE: ICD-10-CM

## 2018-06-20 DIAGNOSIS — I10 HYPERTENSION, UNCONTROLLED: Primary | ICD-10-CM

## 2018-06-20 DIAGNOSIS — Z87.39 HISTORY OF GOUT: ICD-10-CM

## 2018-06-20 RX ORDER — CHLORTHALIDONE 25 MG/1
12.5 TABLET ORAL
Qty: 30 TAB | Refills: 5 | Status: SHIPPED | OUTPATIENT
Start: 2018-06-20 | End: 2018-08-09 | Stop reason: SDUPTHER

## 2018-06-20 NOTE — PROGRESS NOTES
Chief Complaint   Patient presents with    Leg Swelling     started yesterday evening and was hoping it was just from being on feet but it never went down. Pharmacist stated that the amlodipine may have caused the swelling    Blood Pressure Check     BP runs high in md office pt \"runs considerably lower at home\"     Subjective:  Long term hypertensive who had good response to the diuretic, taken off diuretic because of probable two episodes of gout. He is a vegetarian, but does drink juices and sodas that have fructose and corn syrup added. He started noticing edema about 7-8 days after starting Amlodipine. He's not clear why. He sits at a desk for up to 12 hours at times. His salt intake has been low. His blood pressure at home has been normal.      Physical Examination:  His blood pressure here is elevated. He had a regular rhythm. He had some edema in his lower extremities, mostly in the shin, pretibial, top of foot, not pitting. I told him this is likely from calcium blocker. It may improve with walking and time. It may improve possibly if he goes on a small dose diuretic, half of a Chorthalidone every other day, continue with Exforge. Plan:  I told him if the edema is still present after a month he probably needs to change meds, possibly go back to Chlorthalidone along with Allopurinol. We also reviewed the dietary restrictions for gout. Vitals:    06/20/18 1554 06/20/18 1559   BP: (!) 153/93 157/90   Pulse: (!) 58 (!) 57   Resp: 16    Temp: 98 °F (36.7 °C)    TempSrc: Oral    SpO2: 98%    Weight: 211 lb 6.4 oz (95.9 kg)    Height: 6' 1\" (1.854 m)      Diagnoses and all orders for this visit:    1. Hypertension, uncontrolled    2. Edema, unspecified type    3. History of gout    Other orders  -     chlorthalidone (HYGROTEN) 25 mg tablet; Take 0.5 Tabs by mouth every fourty-eight (48) hours.

## 2018-06-20 NOTE — MR AVS SNAPSHOT
[address] [place] [address] Drive [address] [facility] 
[phone] Patient: Wendy Jordan MRN: [id number] Visit Information Date & Time Provider Department Dept. Phone Encounter #  
 6/20/2018  3:45 PM Jacki Rutledge, [address] [facility] [phone] [id number] Upcoming Health Maintenance Date Due Hepatitis C Screening 1960 DTaP/Tdap/Td series (1 - Tdap) 12/22/1979 FOBT Q 1 YEAR AGE 50-75 12/22/2008 Allergies as of 6/20/2018  Review Complete On: 6/20/2018 By: Shakira Pacheco Severity Noted Reaction Type Reactions Levaquin [Levofloxacin]  02/25/2015    Rash Current Immunizations  Reviewed on 6/11/2018 No immunizations on file. Not reviewed this visit Vitals BP Pulse Temp Resp Height(growth percentile) Weight(growth percentile) 157/90 (BP 1 Location: Right arm, BP Patient Position: Sitting) (!) 57 98 °F (36.7 °C) (Oral) 16 6' 1\" (1.854 m) 211 lb 6.4 oz (95.9 kg) SpO2 BMI Smoking Status 98% 27.89 kg/m2 Never Smoker Vitals History BMI and BSA Data Body Mass Index Body Surface Area  
 27.89 kg/m 2 2.22 m 2 Preferred Pharmacy Pharmacy Name Phone [id number] [place] [address] [phone] Your Updated Medication List  
  
   
This list is accurate as of 6/20/18  4:25 PM.  Always use your most recent med list. amLODIPine-valsartan 5-160 mg per tablet Commonly known as:  Cathern Robert Take 1 Tab by mouth daily. apixaban 5 mg tablet Commonly known as:  Donnise Or Take 2 Tabs by mouth two (2) times a day. * chlorthalidone 25 mg tablet Commonly known as:  HYGROTEN  
TAKE 1 TABLET BY MOUTH EVERY DAY  
  
 * chlorthalidone 25 mg tablet Commonly known as:  Earnie Sleet Take 0.5 Tabs by mouth every fourty-eight (48) hours. cholecalciferol 1,000 unit Cap Commonly known as:  VITAMIN D3 Take 1,000 Units by mouth daily. * Notice: This list has 2 medication(s) that are the same as other medications prescribed for you. Read the directions carefully, and ask your doctor or other care provider to review them with you. Prescriptions Sent to Pharmacy Refills  
 chlorthalidone (HYGROTEN) 25 mg tablet 5 Sig: Take 0.5 Tabs by mouth every fourty-eight (48) hours. Class: Normal  
 Pharmacy: [address] [place] [address], [address]  #: [phone] Route: Oral  
  
Introducing [facility] SERVICES! Dear Svetlana Courser: Thank you for requesting a [employer] account. Our records indicate that you already have an active [employer] account. You can access your account anytime at [email or web address] Did you know that you can access your hospital and ER discharge instructions at any time in [employer]? You can also review all of your test results from your hospital stay or ER visit. Additional Information If you have questions, please visit the Frequently Asked Questions section of the [employer] website at [email or web address] Remember, [employer] is NOT to be used for urgent needs. For medical emergencies, dial 911. Now available from your iPhone and Android! Please provide this summary of care documentation to your next provider. Your primary care clinician is listed as Jorje Burkitt. If you have any questions after today's visit, please call [phone].

## 2018-06-20 NOTE — PROGRESS NOTES
Chief Complaint   Patient presents with    Leg Swelling     started yesterday evening and was hoping it was just from being on feet but it never went down. Pharmacist stated that the amlodipine may have caused the swelling    Blood Pressure Check     BP runs high in md office pt \"runs considerably lower at home\"       Health Maintenance Due   Topic Date Due    Hepatitis C Screening  1960    DTaP/Tdap/Td series (1 - Tdap) 12/22/1979    FOBT Q 1 YEAR AGE 50-75  12/22/2008     Coordination of Care Questions    1. Have you been to the ER, urgent care clinic since your last visit? No       Hospitalized since your last visit? No    2. Have you seen or consulted any other health care providers outside of the [facility] since your last visit? Include any pap smears or colon screening.  No

## 2018-06-22 ENCOUNTER — PATIENT MESSAGE (OUTPATIENT)
Dept: INTERNAL MEDICINE CLINIC | Age: 58
End: 2018-06-22

## 2018-06-27 NOTE — TELEPHONE ENCOUNTER
From: Jennye Peabody  To: Sean Burns PA-C  Sent: 6/22/2018 5:22 PM EDT  Subject: Prescription Question    Carry Leber, Dr Richardean Simmonds recommended I take half of the Chlorthalidone I was taking to help alleviate the swelling in my legs from taking the Amodipine-Valsartan which I was taking because of concerns over getting gout from taking the Chlorthalidone. May I propose taking the dose I had been taking of the Chlorthalidone because I had my best BP readings taking it, and other than this most recent bout with gout, it has been almost a year since I've had gout. This would mean I would only take the Chlorthalidone as I had been and not both the Chlorthalidone and Amodipine-Valsartan.     Thank you,  Ryan Cintron

## 2018-07-17 DIAGNOSIS — I10 ESSENTIAL HYPERTENSION: ICD-10-CM

## 2018-07-17 RX ORDER — CHLORTHALIDONE 25 MG/1
TABLET ORAL
Qty: 90 TAB | Refills: 1 | Status: SHIPPED | OUTPATIENT
Start: 2018-07-17 | End: 2018-07-18 | Stop reason: SDUPTHER

## 2018-07-18 RX ORDER — CHLORTHALIDONE 25 MG/1
TABLET ORAL
Qty: 30 TAB | Refills: 5 | Status: SHIPPED | OUTPATIENT
Start: 2018-07-18 | End: 2018-08-09 | Stop reason: SDUPTHER

## 2018-08-09 ENCOUNTER — OFFICE VISIT (OUTPATIENT)
Dept: INTERNAL MEDICINE CLINIC | Age: 58
End: 2018-08-09

## 2018-08-09 VITALS
TEMPERATURE: 98.8 F | HEART RATE: 63 BPM | HEIGHT: 73 IN | BODY MASS INDEX: 27.17 KG/M2 | OXYGEN SATURATION: 99 % | WEIGHT: 205 LBS | SYSTOLIC BLOOD PRESSURE: 136 MMHG | DIASTOLIC BLOOD PRESSURE: 85 MMHG | RESPIRATION RATE: 18 BRPM

## 2018-08-09 DIAGNOSIS — Z11.59 ENCOUNTER FOR HEPATITIS C SCREENING TEST FOR LOW RISK PATIENT: ICD-10-CM

## 2018-08-09 DIAGNOSIS — M79.2 NEURALGIA: Primary | ICD-10-CM

## 2018-08-09 DIAGNOSIS — Z78.9 VEGETARIAN DIET: ICD-10-CM

## 2018-08-09 NOTE — PROGRESS NOTES
HISTORY OF PRESENT ILLNESS  Claudia Gallego is a 59 y.o. male. Chief Complaint   Patient presents with    Other     numbness in both hands mostly in right hand. Health Maintenance Due   Topic Date Due    Hepatitis C Screening  1960    DTaP/Tdap/Td series (1 - Tdap) 12/22/1979    FOBT Q 1 YEAR AGE 50-75  12/22/2008     HPI  Strange crawling sensations under skin all over entire body - all extremities and scalp. \"Like rushing water under my skin in a sci-fi movie\". Not terribly bothersome, but noticible. Pt notes that this started happening around the time he started taking Eliquis. Reviewed s/e profile of Eliquis, and no associated noted s/e. No results found for: B12LT, FOL, RBCF     Does not eat meat. Occasionally eats fish. Eats ~ 1 cup of Total cereal daily for vitamins. Not taking MVI. No decrease in sensation    Review of Systems   Respiratory: Negative for shortness of breath. Cardiovascular: Negative for chest pain and palpitations. Neurological: Positive for tingling. Negative for dizziness, tremors, sensory change, focal weakness, seizures, loss of consciousness and weakness. Physical Exam   Constitutional: He is oriented to person, place, and time. He appears well-developed and well-nourished. No distress. HENT:   Head: Normocephalic and atraumatic. Neck: Neck supple. No JVD present. Cardiovascular: Normal rate, regular rhythm and normal heart sounds. Pulmonary/Chest: Effort normal and breath sounds normal. No respiratory distress. Musculoskeletal: Normal range of motion. He exhibits no edema. Strength normal and equal B throughout. Neurological: He is alert and oriented to person, place, and time. He has normal reflexes. No cranial nerve deficit. He exhibits normal muscle tone. Coordination normal.   Sensation intact throughout. Skin: Skin is warm and dry. Psychiatric: He has a normal mood and affect.  His behavior is normal. Judgment and thought content normal.   Nursing note and vitals reviewed. ASSESSMENT and PLAN  Diagnoses and all orders for this visit:    1. Neuralgia  -     VITAMIN B12  -     CBC WITH AUTOMATED DIFF  -     METABOLIC PANEL, COMPREHENSIVE  -     MAGNESIUM  -     FOLATE  -     TSH RFX ON ABNORMAL TO FREE T4    2. Vegetarian diet    3.  Encounter for hepatitis C screening test for low risk patient  -     HCV AB W/RFLX TO EULALIO

## 2018-08-09 NOTE — MR AVS SNAPSHOT
[address] [place] [address] [address] [place] [address] 
[phone] Patient: Benson Gutiérrez MRN: [id number]:04/95/[id number] Visit Information Date & Time Provider Department Dept. Phone Encounter #  
 8/9/2018  8:30 AM Jane Morales PA-C [facility] [phone] [id number] Follow-up Instructions Return if symptoms worsen or fail to improve. Follow-up and Disposition History Upcoming Health Maintenance Date Due Hepatitis C Screening 1960 DTaP/Tdap/Td series (1 - Tdap) 12/22/1979 COLONOSCOPY 11/18/2026 Allergies as of 8/9/2018  Review Complete On: 8/9/2018 By: Malina Pugh Severity Noted Reaction Type Reactions Levaquin [Levofloxacin]  02/25/2015    Rash Current Immunizations  Reviewed on 6/11/2018 No immunizations on file. Not reviewed this visit You Were Diagnosed With   
  
 Codes Comments Neuralgia    -  Primary ICD-10-CM: M79.2 ICD-9-CM: 729.2 Vegetarian diet     ICD-10-CM: Z78.9 ICD-9-CM: V49.89 Encounter for hepatitis C screening test for low risk patient     ICD-10-CM: Z11.59 
ICD-9-CM: V73.89 Vitals BP Pulse Temp Resp Height(growth percentile) Weight(growth percentile) 136/85 (BP 1 Location: Left arm, BP Patient Position: At rest) 63 98.8 °F (37.1 °C) (Oral) 18 6' 1\" (1.854 m) 205 lb (93 kg) SpO2 BMI Smoking Status 99% 27.05 kg/m2 Never Smoker BMI and BSA Data Body Mass Index Body Surface Area  
 27.05 kg/m 2 2.19 m 2 Preferred Pharmacy Pharmacy Name Phone [id number] [place], [address] [phone] Your Updated Medication List  
  
   
This list is accurate as of 8/9/18  9:56 AM.  Always use your most recent med list.  
  
  
  
  
 * chlorthalidone 25 mg tablet Commonly known as:  Wilhemena Nickels  
 Take 0.5 Tabs by mouth every fourty-eight (48) hours. * chlorthalidone 25 mg tablet Commonly known as:  HYGROTEN  
TAKE 1 TABLET BY MOUTH EVERY DAY  
  
 cholecalciferol 1,000 unit Cap Commonly known as:  VITAMIN D3 Take 1,000 Units by mouth daily. ELIQUIS 5 mg tablet Generic drug:  apixaban TAKE 1 TALBET BY MOUTH 2 TIMES A DAY * Notice: This list has 2 medication(s) that are the same as other medications prescribed for you. Read the directions carefully, and ask your doctor or other care provider to review them with you. We Performed the Following CBC WITH AUTOMATED DIFF [51526 CPT(R)] FOLATE [id number] CPT(R)] HCV AB W/RFLX TO EULALIO [67328 CPT(R)] MAGNESIUM [id number] CPT(R)] METABOLIC PANEL, COMPREHENSIVE [42338 CPT(R)] TSH RFX ON ABNORMAL TO FREE T4 [[id number] Custom] VITAMIN B12 [id number] CPT(R)] Follow-up Instructions Return if symptoms worsen or fail to improve. Introducing [facility]! Dear Wing Caller: Thank you for requesting a Medopad account. Our records indicate that you already have an active Medopad account. You can access your account anytime at [email or web address] Did you know that you can access your hospital and ER discharge instructions at any time in Medopad? You can also review all of your test results from your hospital stay or ER visit. Additional Information If you have questions, please visit the Frequently Asked Questions section of the Medopad website at [email or web address]. Remember, Medopad is NOT to be used for urgent needs. For medical emergencies, dial 911. Now available from your iPhone and Android! Please provide this summary of care documentation to your next provider. Your primary care clinician is listed as Mihaela Ochoa. If you have any questions after today's visit, please call [phone].

## 2018-08-09 NOTE — PROGRESS NOTES
1. Have you been to the ER, urgent care clinic since your last visit? Hospitalized since your last visit? No    2. Have you seen or consulted any other health care providers outside of the [address] [place] [address] since your last visit? Include any pap smears or colon screening. No   Chief Complaint   Patient presents with    Other     numbness in both hands mostly in right hand.       Not fasting

## 2018-08-10 LAB
ALBUMIN SERPL-MCNC: 4.3 G/DL (ref 3.5–5.5)
ALBUMIN/GLOB SERPL: 1.3 {RATIO} (ref 1.2–2.2)
ALP SERPL-CCNC: 63 IU/L (ref 39–117)
ALT SERPL-CCNC: 25 IU/L (ref 0–44)
AST SERPL-CCNC: 29 IU/L (ref 0–40)
BASOPHILS # BLD AUTO: 0 X10E3/UL (ref 0–0.2)
BASOPHILS NFR BLD AUTO: 1 %
BILIRUB SERPL-MCNC: 0.8 MG/DL (ref 0–1.2)
BUN SERPL-MCNC: 15 MG/DL (ref 6–24)
BUN/CREAT SERPL: 13 (ref 9–20)
CALCIUM SERPL-MCNC: 10.1 MG/DL (ref 8.7–10.2)
CHLORIDE SERPL-SCNC: 96 MMOL/L (ref 96–106)
CO2 SERPL-SCNC: 29 MMOL/L (ref 20–29)
CREAT SERPL-MCNC: 1.12 MG/DL (ref 0.76–1.27)
EOSINOPHIL # BLD AUTO: 0.1 X10E3/UL (ref 0–0.4)
EOSINOPHIL NFR BLD AUTO: 4 %
ERYTHROCYTE [DISTWIDTH] IN BLOOD BY AUTOMATED COUNT: 16 % (ref 12.3–15.4)
FOLATE SERPL-MCNC: >20 NG/ML
GLOBULIN SER CALC-MCNC: 3.4 G/DL (ref 1.5–4.5)
GLUCOSE SERPL-MCNC: 80 MG/DL (ref 65–99)
HCT VFR BLD AUTO: 44 % (ref 37.5–51)
HCV AB S/CO SERPL IA: <0.1 S/CO RATIO (ref 0–0.9)
HCV AB SERPL QL IA: NORMAL
HGB BLD-MCNC: 14.9 G/DL (ref 13–17.7)
IMM GRANULOCYTES # BLD: 0 X10E3/UL (ref 0–0.1)
IMM GRANULOCYTES NFR BLD: 0 %
LYMPHOCYTES # BLD AUTO: 2.1 X10E3/UL (ref 0.7–3.1)
LYMPHOCYTES NFR BLD AUTO: 56 %
MAGNESIUM SERPL-MCNC: 2.2 MG/DL (ref 1.6–2.3)
MCH RBC QN AUTO: 26.7 PG (ref 26.6–33)
MCHC RBC AUTO-ENTMCNC: 33.9 G/DL (ref 31.5–35.7)
MCV RBC AUTO: 79 FL (ref 79–97)
MONOCYTES # BLD AUTO: 0.3 X10E3/UL (ref 0.1–0.9)
MONOCYTES NFR BLD AUTO: 7 %
NEUTROPHILS # BLD AUTO: 1.2 X10E3/UL (ref 1.4–7)
NEUTROPHILS NFR BLD AUTO: 32 %
PLATELET # BLD AUTO: 281 X10E3/UL (ref 150–379)
POTASSIUM SERPL-SCNC: 4 MMOL/L (ref 3.5–5.2)
PROT SERPL-MCNC: 7.7 G/DL (ref 6–8.5)
RBC # BLD AUTO: 5.58 X10E6/UL (ref 4.14–5.8)
SODIUM SERPL-SCNC: 140 MMOL/L (ref 134–144)
TSH SERPL DL<=0.005 MIU/L-ACNC: 2.01 UIU/ML (ref 0.45–4.5)
VIT B12 SERPL-MCNC: 599 PG/ML (ref 232–1245)
WBC # BLD AUTO: 3.7 X10E3/UL (ref 3.4–10.8)

## 2018-08-10 NOTE — PROGRESS NOTES
Hi Mr. Jose Noah,   All your labs are normal, so a vitamin/mineral deficiency/thyroid condition is not the cause of your symptoms. If you would like to try changing the Eliquis to another drug in that class to see if your symptoms resolve, let me know.    Ana Silverman

## 2018-08-29 ENCOUNTER — OFFICE VISIT (OUTPATIENT)
Dept: INTERNAL MEDICINE CLINIC | Age: 58
End: 2018-08-29

## 2018-08-29 VITALS
OXYGEN SATURATION: 99 % | SYSTOLIC BLOOD PRESSURE: 157 MMHG | TEMPERATURE: 98.5 F | RESPIRATION RATE: 18 BRPM | BODY MASS INDEX: 27.25 KG/M2 | HEART RATE: 59 BPM | DIASTOLIC BLOOD PRESSURE: 92 MMHG | WEIGHT: 205.6 LBS | HEIGHT: 73 IN

## 2018-08-29 DIAGNOSIS — M25.512 ACUTE PAIN OF LEFT SHOULDER: ICD-10-CM

## 2018-08-29 DIAGNOSIS — I10 HYPERTENSION, UNCONTROLLED: Primary | ICD-10-CM

## 2018-08-29 RX ORDER — CHLORTHALIDONE 25 MG/1
25 TABLET ORAL DAILY
Qty: 90 TAB | Refills: 3 | Status: SHIPPED | OUTPATIENT
Start: 2018-08-29 | End: 2019-02-22 | Stop reason: ALTCHOICE

## 2018-08-29 RX ORDER — CYCLOBENZAPRINE HCL 10 MG
10 TABLET ORAL
Qty: 15 TAB | Refills: 0 | Status: SHIPPED | OUTPATIENT
Start: 2018-08-29 | End: 2019-04-07

## 2018-08-29 NOTE — MR AVS SNAPSHOT
[address] [place] [address] [address] [facility] 
[phone] Patient: Jake Ortiz MRN: [id number]:[id number] Visit Information Date & Time Provider Department Dept. Phone Encounter #  
 8/29/2018  8:00 AM Juliane Esposito PA-C [facility] [phone] [id number] Upcoming Health Maintenance Date Due DTaP/Tdap/Td series (1 - Tdap) 12/22/1979 COLONOSCOPY 11/18/2026 Allergies as of 8/29/2018  Review Complete On: 8/29/2018 By: Portillo Lopez Severity Noted Reaction Type Reactions Levaquin [Levofloxacin]  02/25/2015    Rash Current Immunizations  Reviewed on 6/11/2018 No immunizations on file. Not reviewed this visit You Were Diagnosed With   
  
 Codes Comments Hypertension, uncontrolled    -  Primary ICD-10-CM: I10 
ICD-9-CM: 401.9 Vitals BP Pulse Temp Resp Height(growth percentile) Weight(growth percentile) (!) 157/92 (BP 1 Location: Left arm, BP Patient Position: At rest) (!) 59 98.5 °F (36.9 °C) (Oral) 18 6' 1\" (1.854 m) 205 lb 9.6 oz (93.3 kg) SpO2 BMI Smoking Status 99% 27.13 kg/m2 Never Smoker BMI and BSA Data Body Mass Index Body Surface Area  
 27.13 kg/m 2 2.19 m 2 Preferred Pharmacy Pharmacy Name Phone [address] [facility], [address] [place] [address] [address] [place] [address] Your Updated Medication List  
  
   
This list is accurate as of 8/29/18  8:24 AM.  Always use your most recent med list.  
  
  
  
  
 apixaban 5 mg tablet Commonly known as:  ELIQUIS  
TAKE 1 TALBET BY MOUTH 2 TIMES A DAY  
  
 chlorthalidone 25 mg tablet Commonly known as:  Evelyn Bugler Take 1 Tab by mouth daily. cholecalciferol 1,000 unit Cap Commonly known as:  VITAMIN D3 Take 1,000 Units by mouth daily. cyclobenzaprine 10 mg tablet Commonly known as:  FLEXERIL Take 1 Tab by mouth nightly. Prescriptions Sent to Pharmacy Refills  
 cyclobenzaprine (FLEXERIL) 10 mg tablet 0 Sig: Take 1 Tab by mouth nightly. Class: Normal  
 Pharmacy: [address], [address] Ph #: [phone] Route: Oral  
 chlorthalidone (HYGROTEN) 25 mg tablet 3 Sig: Take 1 Tab by mouth daily. Class: Normal  
 Pharmacy: [address], [address] Ph #: [phone] Route: Oral  
  
Introducing [facility]! Dear [address] [place] [address]: Thank you for requesting a Are You a Human account. Our records indicate that you already have an active Are You a Human account. You can access your account anytime at [email or web address] Did you know that you can access your hospital and ER discharge instructions at any time in Are You a Human? You can also review all of your test results from your hospital stay or ER visit. Additional Information If you have questions, please visit the Frequently Asked Questions section of the Are You a Human website at [email or web address] Remember, Are You a Human is NOT to be used for urgent needs. For medical emergencies, dial 911. Now available from your iPhone and Android! Please provide this summary of care documentation to your next provider. Your primary care clinician is listed as Alba Barone. If you have any questions after today's visit, please call [phone].

## 2018-08-29 NOTE — PROGRESS NOTES
1. Have you been to the ER, urgent care clinic since your last visit? Hospitalized since your last visit?no 2. Have you seen or consulted any other health care providers outside of the [address] [place] [address] since your last visit? Include any pap smears or colon screening. No 
 
Chief Complaint Patient presents with  Shoulder Pain  
  left shoulder pain for the last two weeks Not fasting Abuse Screening Questionnaire 3/16/2018 Do you ever feel afraid of your partner? Carlos Harrison Are you in a relationship with someone who physically or mentally threatens you? Carlos Harrison Is it safe for you to go home? Y  
 
 
PHQ over the last two weeks 8/29/2018 Little interest or pleasure in doing things Not at all Feeling down, depressed, irritable, or hopeless Not at all Total Score PHQ 2 0

## 2018-08-29 NOTE — PROGRESS NOTES
HISTORY OF PRESENT ILLNESS Maribel Love is a 59 y.o. male. Chief Complaint Patient presents with  Shoulder Pain  
  left shoulder pain for the last two weeks Health Maintenance Due Topic Date Due  
 DTaP/Tdap/Td series (1 - Tdap) 12/22/1979 HPI Shoulder pain x 2-3 weeks. Notes increase in exercise with free weights at the gym. Trying to lose weight/work on fitness with exercise. HTN - uncontrolled today, but pt notes he is in pain and BP at home has been normal.  
 
BP Readings from Last 3 Encounters:  
08/29/18 (!) 157/92  
08/09/18 136/85  
06/20/18 157/90 Review of Systems Respiratory: Negative for shortness of breath. Cardiovascular: Negative for chest pain and palpitations. Musculoskeletal: Positive for back pain, myalgias and neck pain. Negative for falls and joint pain. Neurological: Negative for dizziness, loss of consciousness and weakness. Physical Exam  
Constitutional: He is oriented to person, place, and time. He appears well-developed and well-nourished. No distress. HENT:  
Head: Normocephalic and atraumatic. Neck: Neck supple. No JVD present. Cardiovascular: Normal rate, regular rhythm and normal heart sounds. Pulmonary/Chest: Effort normal and breath sounds normal. No respiratory distress. Musculoskeletal: He exhibits no edema, tenderness or deformity. ROM B shoulders normal.   
Neurological: He is alert and oriented to person, place, and time. Skin: Skin is warm and dry. Psychiatric: He has a normal mood and affect. His behavior is normal. Judgment and thought content normal.  
Nursing note and vitals reviewed. ASSESSMENT and PLAN Diagnoses and all orders for this visit: 
 
1. Hypertension, uncontrolled 
-     chlorthalidone (HYGROTEN) 25 mg tablet; Take 1 Tab by mouth daily. Encouraged pt to continue monitoring at home and follow up if BP remains elevated. He agrees. 2. Acute pain of left shoulder -     cyclobenzaprine (FLEXERIL) 10 mg tablet; Take 1 Tab by mouth nightly. Stretches given.

## 2018-10-11 ENCOUNTER — OFFICE VISIT (OUTPATIENT)
Dept: NEUROLOGY | Age: 58
End: 2018-10-11

## 2018-10-11 VITALS
BODY MASS INDEX: 27.44 KG/M2 | RESPIRATION RATE: 18 BRPM | DIASTOLIC BLOOD PRESSURE: 88 MMHG | HEART RATE: 64 BPM | SYSTOLIC BLOOD PRESSURE: 164 MMHG | OXYGEN SATURATION: 98 % | WEIGHT: 208 LBS

## 2018-10-11 DIAGNOSIS — R20.2 PARESTHESIA: Primary | ICD-10-CM

## 2018-10-11 DIAGNOSIS — R20.0 BILATERAL HAND NUMBNESS: ICD-10-CM

## 2018-10-11 NOTE — PATIENT INSTRUCTIONS

## 2018-10-11 NOTE — PROGRESS NOTES
C/o numbness/crawling skin to fingers in right hand x5 months-progressing C/o itchy skin for the past couple of weeks

## 2018-10-11 NOTE — PROGRESS NOTES
Neurology Consult Note HISTORY PROVIDED BY: patient Chief Complaint:  
Chief Complaint Patient presents with  Numbness Subjective:  
 Karine Pandya is a 59 y.o. right handed male who presents in consultation for paresthesias. Pt reports onset of sxs while hospitalized for DVT/PE in May, 2018. It is a crawling type feeling under his skin and numbness R>>L hands. In the last couple of weeks has had diffuse itching, becoming painful with rubbing. Not severe, not causing him to take anything for it or keeping him awake. Right hand numbness involves all fingers, but more so thumb. No numbness in feet. No neck pain. He was started on Eliquis during hospitalization. He has h/o right LE DVT 10 years ago. Had gout around the same time, took med for a short time. Labs 8/9/18 - CBC with diff, CMP, HCV, B12, TSH - all unremarkable. 3/16/18 - . Hypercoag panel 11/20/13 - Elevated Factor VIII activity. Past Medical History:  
Diagnosis Date  BPH (benign prostatic hyperplasia)  Gout  Hypertension  Thromboembolus ([address] 2009 & 2012 Past Surgical History:  
Procedure Laterality Date  HX COLONOSCOPY  2012 Social History Social History  Marital status:  Spouse name: N/A  
 Number of children: N/A  
 Years of education: N/A Occupational History  Computer safety at [employer] Social History Main Topics  Smoking status: Never Smoker  Smokeless tobacco: Never Used  Alcohol use No  
 Drug use: No  
 Sexual activity: Yes  
  Partners: Female Comment:  [employer] Other Topics Concern  Not on file Social History Narrative Lives in [place] with wife Family History Problem Relation Age of Onset  Cancer Mother   
  multiple myeloma  Diabetes Mother  Hypertension Mother  Stroke Mother  Cancer Father   
  pancreatic  Heart Disease Father  Hypertension Father  No Known Problems Brother  Hypertension Brother Resolved  Other Brother Sarcoid  No Known Problems Son  No Known Problems Daughter Objective:  
Review of Systems Constitutional: Negative. HENT: Negative. Eyes: Negative. Respiratory: Negative. Cardiovascular: Negative. Gastrointestinal: Negative. Genitourinary: Negative. Musculoskeletal: Negative. Skin: Negative for rash. Neurological: Negative. Endo/Heme/Allergies: Negative. Psychiatric/Behavioral: Negative. Allergies Allergen Reactions  Levaquin [Levofloxacin] Rash Meds: Outpatient Medications Prior to Visit Medication Sig Dispense Refill  chlorthalidone (HYGROTEN) 25 mg tablet Take 1 Tab by mouth daily. 90 Tab 3  
 apixaban (ELIQUIS) 5 mg tablet TAKE 1 TALBET BY MOUTH 2 TIMES A  Tab 3  Cholecalciferol, Vitamin D3, 1,000 unit cap Take 1,000 Units by mouth daily.  cyclobenzaprine (FLEXERIL) 10 mg tablet Take 1 Tab by mouth nightly. 15 Tab 0 No facility-administered medications prior to visit. Imaging: MRI Results (most recent): No results found for this or any previous visit. CT Results (most recent): 
 
[place] [facility] [place] 05/22/18 CTA CHEST W OR W WO CONT Narrative EXAM:  CTA CHEST W OR W WO CONT INDICATION:  Shortness of breath. COMPARISON: Chest radiographs 5/22/2018 TECHNIQUE: Helical thin section chest CT following uneventful intravenous 
administration of nonionic contrast according to departmental PE protocol. Coronal and sagittal reformats were performed. 3D/MIP post processing was 
performed. CT dose reduction was achieved through use of a standardized protocol 
tailored for this examination and automatic exposure control for dose 
modulation. Adaptive statistical iterative reconstruction (ASIR) was utilized. FINDINGS: This is a good quality study for the evaluation of pulmonary embolism to the first subsegmental arterial level. There are acute bilateral pulmonary 
emboli to the right and left pulmonary artery level. The visualized thyroid gland is unremarkable. The aorta and main pulmonary 
artery are normal in caliber. Cardiac size is within normal limits. No 
pericardial effusion. No lymphadenopathy by imaging size criteria. There are peripheral opacities in the lateral aspect of the right middle lobe 
and to a lesser extent the peripheral right lower lobe. There is no lung nodule 
or mass. No pleural effusion or pneumothorax. Central airways are unremarkable. Limited images of the upper abdomen are within normal limits. The bony 
structures are age-appropriate Impression IMPRESSION:  
1. There are acute bilateral pulmonary emboli to the right and left pulmonary 
artery level. There are no signs of right heart strain. 2. Lung opacities in the peripheral right middle lobe and right lower lobe may 
represent atelectasis or infarcts. The findings were discussed with Dr. King Tejeda on 5/22/2018 at 1340 hours by Dr. Clarissa Ladd. Juany Messina Reviewed records in [facility] and Tasted Menu tab today Lab Review Results for orders placed or performed in visit on 08/09/18 VITAMIN B12 Result Value Ref Range Vitamin B12 599 232 - 1245 pg/mL CBC WITH AUTOMATED DIFF Result Value Ref Range WBC 3.7 3.4 - 10.8 x10E3/uL  
 RBC 5.58 4.14 - 5.80 x10E6/uL HGB 14.9 13.0 - 17.7 g/dL HCT 44.0 37.5 - 51.0 % MCV 79 79 - 97 fL  
 MCH 26.7 26.6 - 33.0 pg  
 MCHC 33.9 31.5 - 35.7 g/dL  
 RDW 16.0 (H) 12.3 - 15.4 % PLATELET 645 381 - 991 x10E3/uL NEUTROPHILS 32 Not Estab. % Lymphocytes 56 Not Estab. % MONOCYTES 7 Not Estab. % EOSINOPHILS 4 Not Estab. % BASOPHILS 1 Not Estab. %  
 ABS. NEUTROPHILS 1.2 (L) 1.4 - 7.0 x10E3/uL Abs Lymphocytes 2.1 0.7 - 3.1 x10E3/uL  
 ABS. MONOCYTES 0.3 0.1 - 0.9 x10E3/uL  
 ABS. EOSINOPHILS 0.1 0.0 - 0.4 x10E3/uL ABS. BASOPHILS 0.0 0.0 - 0.2 x10E3/uL IMMATURE GRANULOCYTES 0 Not Estab. %  
 ABS. IMM. GRANS. 0.0 0.0 - 0.1 x10E3/uL METABOLIC PANEL, COMPREHENSIVE Result Value Ref Range Glucose 80 65 - 99 mg/dL BUN 15 6 - 24 mg/dL Creatinine 1.12 0.76 - 1.27 mg/dL GFR est non-AA 72 >59 mL/min/1.73 GFR est AA 83 >59 mL/min/1.73  
 BUN/Creatinine ratio 13 9 - 20 Sodium 140 134 - 144 mmol/L Potassium 4.0 3.5 - 5.2 mmol/L Chloride 96 96 - 106 mmol/L  
 CO2 29 20 - 29 mmol/L Calcium 10.1 8.7 - 10.2 mg/dL Protein, total 7.7 6.0 - 8.5 g/dL Albumin 4.3 3.5 - 5.5 g/dL GLOBULIN, TOTAL 3.4 1.5 - 4.5 g/dL A-G Ratio 1.3 1.2 - 2.2 Bilirubin, total 0.8 0.0 - 1.2 mg/dL Alk. phosphatase 63 39 - 117 IU/L  
 AST (SGOT) 29 0 - 40 IU/L  
 ALT (SGPT) 25 0 - 44 IU/L MAGNESIUM Result Value Ref Range Magnesium 2.2 1.6 - 2.3 mg/dL FOLATE Result Value Ref Range Folate >20.0 >3.0 ng/mL TSH RFX ON ABNORMAL TO FREE T4 Result Value Ref Range TSH 2.010 0.450 - 4.500 uIU/mL HCV AB W/RFLX TO EULALIO Result Value Ref Range HCV Ab <0.1 0.0 - 0.9 s/co ratio INTERPRETATION Result Value Ref Range HCV Interpretation Comment Exam: 
Visit Vitals  /88  Pulse 64  Resp 18  Wt 94.3 kg (208 lb)  SpO2 98%  BMI 27.44 kg/m2 General:  Alert, cooperative, no distress. Head:  Normocephalic, without obvious abnormality, atraumatic. Respiratory: 
Heart:   Non labored breathing Regular rate and rhythm, no murmurs Neck:   2+ carotids, no bruits Extremities: Warm, no cyanosis or edema. Pulses: 2+ radial pulses. Neurologic:  MS: Alert and oriented x 4, speech intact. Language intact. Attention and fund of knowledge appropriate. Recent and remote memory intact. Cranial Nerves: 
II: visual fields Full to confrontation II: pupils Equal, round, reactive to light II: optic disc   
III,VII: ptosis none III,IV,VI: extraocular muscles  EOM dysconjugate with end gaze nystagmus bilaterally, +intermittent brief diplopia V: facial light touch sensation  normal  
VII: facial muscle function   symmetric VIII: hearing intact IX: soft palate elevation  normal  
XI: trapezius strength  5/5 XI: sternocleidomastoid strength 5/5 XII: tongue  Midline Motor: normal bulk and tone, no tremor Strength: 5/5 throughout, no PD Sensory: intact to LT, PP Coordination: FTN and HTS intact, VANESSA intact,Romberg negative Gait: normal gait, able to tandem walk Reflexes: 2+ symmetric, toes downgoing Assessment/Plan Pt is a 59 y.o. right handed male with onset of paresthesias while hospitalized for DVT/PE in May, 2018, described as a crawling type feeling under his skin and numbness R>>L hands, and in the last couple of weeks has had diffuse pruritis, becoming painful with rubbing. Exam with dysconjugate eye movements with end gaze nystagmus bilaterally, +intermittent brief diplopia, chronic, o/w is non-focal and unremarkable.  
-No h/o cancer, no systemic illness such as liver or kidney disease, no known h/o autoimmune/CTD though his brother has a h/o sarcoidosis -Given timing with start of Eliquis, question if this is the cause of his pruritis  
-Cervical spine disease is rare cause of pruritis, low suspicion for this. -Recommend NCS/EMG to assess for evidence of mononeuropathy vs radiculopathy to explain hand numbness.  
-Consider skin biopsy for small fiber neuropathy to explain other paresthesias 
-If work up neg, recommend dermatology evaluation. -F/u in 3 months, instructed to call in the interim if needed. Will contact pt by phone after NCS/EMG completed. ICD-10-CM ICD-9-CM 1. Paresthesia R20.2 782.0 EMG NCV MOTOR WITH F/WAVE PER NERVE 2. Bilateral hand numbness R20.0 782. 0 EMG NCV MOTOR WITH F/WAVE PER NERVE Signed: Elio Dejesus MD 
10/11/2018

## 2018-10-11 NOTE — MR AVS SNAPSHOT
[place] [id number] [address] [place] [address] 
[phone] Patient: Casey Peters MRN: [id number]:[id number] Visit Information Date & Time Provider Department Dept. Phone Encounter #  
 10/11/2018  8:00 AM Dalia Mcdaniel MD [facility] at [facility] [id number] Follow-up Instructions Return in about 3 months (around 1/11/2019). Your Appointments 3/1/2019  7:30 AM  
ROUTINE CARE with Sujit Azevedo PA-C [facility] ([facility]) Appt Note: R F/U  
 [place] [address] [facility] [address]  
[facility] [facility]. [address] [address] Upcoming Health Maintenance Date Due DTaP/Tdap/Td series (1 - Tdap) 12/22/1979 Shingrix Vaccine Age 50> (1 of 2) 12/22/2008 COLONOSCOPY 11/18/2026 Allergies as of 10/11/2018  Review Complete On: 10/11/2018 By: Gonzalo Arthur LPN Severity Noted Reaction Type Reactions Levaquin [Levofloxacin]  02/25/2015    Rash Current Immunizations  Reviewed on 6/11/2018 No immunizations on file. Not reviewed this visit You Were Diagnosed With   
  
 Codes Comments Paresthesia    -  Primary ICD-10-CM: R20.2 ICD-9-CM: 782.0 Bilateral hand numbness     ICD-10-CM: R20.0 ICD-9-CM: 260. 0 Vitals BP Pulse Resp Weight(growth percentile) SpO2 BMI  
 164/88 64 18 208 lb (94.3 kg) 98% 27.44 kg/m2 Smoking Status Never Smoker Vitals History BMI and BSA Data Body Mass Index Body Surface Area  
 27.44 kg/m 2 2.2 m 2 Preferred Pharmacy Pharmacy Name Phone 305 [facility], [address] [address] [place] [address] [address] Your Updated Medication List  
  
   
This list is accurate as of 10/11/18  8:56 AM.  Always use your most recent med list.  
  
  
  
  
 apixaban 5 mg tablet Commonly known as:  ELIQUIS  
TAKE 1 TALBET BY MOUTH 2 TIMES A DAY  
  
 chlorthalidone 25 mg tablet Commonly known as:  Shima Harshal Take 1 Tab by mouth daily. cholecalciferol 1,000 unit Cap Commonly known as:  VITAMIN D3 Take 1,000 Units by mouth daily. cyclobenzaprine 10 mg tablet Commonly known as:  FLEXERIL Take 1 Tab by mouth nightly. Follow-up Instructions Return in about 3 months (around 1/11/2019). To-Do List   
 10/25/2018 Neurology:  EMG NCV MOTOR WITH F/WAVE PER NERVE Patient Instructions A Healthy Lifestyle: Care Instructions Your Care Instructions A healthy lifestyle can help you feel good, stay at a healthy weight, and have plenty of energy for both work and play. A healthy lifestyle is something you can share with your whole family. A healthy lifestyle also can lower your risk for serious health problems, such as high blood pressure, heart disease, and diabetes. You can follow a few steps listed below to improve your health and the health of your family. Follow-up care is a key part of your treatment and safety. Be sure to make and go to all appointments, and call your doctor if you are having problems. It's also a good idea to know your test results and keep a list of the medicines you take. How can you care for yourself at home? · Do not eat too much sugar, fat, or fast foods. You can still have dessert and treats now and then. The goal is moderation. · Start small to improve your eating habits. Pay attention to portion sizes, drink less juice and soda pop, and eat more fruits and vegetables. ¨ Eat a healthy amount of food. A 3-ounce serving of meat, for example, is about the size of a deck of cards. Fill the rest of your plate with vegetables and whole grains. ¨ Limit the amount of soda and sports drinks you have every day. Drink more water when you are thirsty. ¨ Eat at least 5 servings of fruits and vegetables every day. It may seem like a lot, but it is not hard to reach this goal. A serving or helping is 1 piece of fruit, 1 cup of vegetables, or 2 cups of leafy, raw vegetables. Have an apple or some carrot sticks as an afternoon snack instead of a candy bar. Try to have fruits and/or vegetables at every meal. 
· Make exercise part of your daily routine. You may want to start with simple activities, such as walking, bicycling, or slow swimming. Try to be active 30 to 60 minutes every day. You do not need to do all 30 to 60 minutes all at once. For example, you can exercise 3 times a day for 10 or 20 minutes. Moderate exercise is safe for most people, but it is always a good idea to talk to your doctor before starting an exercise program. 
· Keep moving. Ana Seamen the lawn, work in the garden, or [employer]. Take the stairs instead of the elevator at work. · If you smoke, quit. People who smoke have an increased risk for heart attack, stroke, cancer, and other lung illnesses. Quitting is hard, but there are ways to boost your chance of quitting tobacco for good. ¨ Use nicotine gum, patches, or lozenges. ¨ Ask your doctor about stop-smoking programs and medicines. ¨ Keep trying. In addition to reducing your risk of diseases in the future, you will notice some benefits soon after you stop using tobacco. If you have shortness of breath or asthma symptoms, they will likely get better within a few weeks after you quit. · Limit how much alcohol you drink. Moderate amounts of alcohol (up to 2 drinks a day for men, 1 drink a day for women) are okay. But drinking too much can lead to liver problems, high blood pressure, and other health problems. Family health If you have a family, there are many things you can do together to improve your health. · Eat meals together as a family as often as possible. · Eat healthy foods.  This includes fruits, vegetables, lean meats and dairy, and whole grains. · Include your family in your fitness plan. Most people think of activities such as jogging or tennis as the way to fitness, but there are many ways you and your family can be more active. Anything that makes you breathe hard and gets your heart pumping is exercise. Here are some tips: 
¨ Walk to do errands or to take your child to school or the bus. ¨ Go for a family bike ride after dinner instead of watching TV. Where can you learn more? Go to http://chai-anahy.info/. Enter D652 in the search box to learn more about \"A Healthy Lifestyle: Care Instructions. \" Current as of: December 7, 2017 Content Version: 11.8 © 7450-1563 [employer]. Care instructions adapted under license by [employer] (which disclaims liability or warranty for this information). If you have questions about a medical condition or this instruction, always ask your healthcare professional. [place] [address] any warranty or liability for your use of this information. Introducing [facility]! Dear Vik Jain: Thank you for requesting a Smart Patients account. Our records indicate that you already have an active Smart Patients account. You can access your account anytime at [email or web address] Did you know that you can access your hospital and ER discharge instructions at any time in Smart Patients? You can also review all of your test results from your hospital stay or ER visit. Additional Information If you have questions, please visit the Frequently Asked Questions section of the Smart Patients website at [email or web address] Remember, Smart Patients is NOT to be used for urgent needs. For medical emergencies, dial 911. Now available from your iPhone and Android! Please provide this summary of care documentation to your next provider. Your primary care clinician is listed as Iliana Narvaez.  If you have any questions after today's visit, please call [phone].

## 2018-10-18 ENCOUNTER — OFFICE VISIT (OUTPATIENT)
Dept: NEUROLOGY | Age: 58
End: 2018-10-18

## 2018-10-18 VITALS
OXYGEN SATURATION: 99 % | HEART RATE: 61 BPM | BODY MASS INDEX: 27.57 KG/M2 | DIASTOLIC BLOOD PRESSURE: 99 MMHG | WEIGHT: 208 LBS | RESPIRATION RATE: 18 BRPM | HEIGHT: 73 IN | SYSTOLIC BLOOD PRESSURE: 163 MMHG

## 2018-10-18 DIAGNOSIS — R20.0 BILATERAL HAND NUMBNESS: Primary | ICD-10-CM

## 2018-10-18 DIAGNOSIS — G62.9 POLYNEUROPATHY: ICD-10-CM

## 2018-10-18 NOTE — PROGRESS NOTES
[facility]  [address] [place] [address], [address] [place] [address]  Phone [phone] Fax [phone]  Test Date:  10/18/2018    Patient: Tiffanie Belle : 1960 Physician: Chirag Barron MD   Sex: Male Height: 6' 1\" Ref Phys: Marc Haney MD   ID#: [id number] Weight: 208 lbs. Technician: Jeferson Suazo, [employer] Tech     Patient Complaints:  Generalized paresthesias    Patient History / Exam:  49-year-old male who has been complaining of numbness and tingling sensation in all extremities, more so in the arms, right more than left. He also gets an itching and burning sensation and is being evaluated to rule out a diffuse neuropathic process. On exam: Alert and fully oriented. Cranial nerves II through XII intact. Muscle tone and bulk normal per strength 5/5 and symmetric. Toes downgoing. Sensation intact to light touch and pinprick. Deep tendon reflexes are 2 x 2 at biceps, brachial radialis and knees. Ankle jerks hypoactive. Proprioception intact. Gait steady. NCV & EMG Findings:  Evaluation of the left tibial motor nerve showed reduced amplitude (2.1 mV). The right tibial motor nerve showed reduced amplitude (2.5 mV) and decreased conduction velocity (Knee-Ankle, 33 m/s). The left Sup Peroneal sensory and the right Sup Peroneal sensory nerves showed no response (14 cm). The left sural sensory and the right sural sensory nerves showed no response (Calf). The right ulnar sensory nerve showed prolonged distal peak latency (4.4 ms), reduced amplitude (12.5 µV), and decreased conduction velocity (Wrist-5th Digit, 32 m/s). The right median/ulnar (palm) comparison nerve showed prolonged distal peak latency (Median Palm, 3.2 ms) and prolonged distal peak latency (Ulnar Palm, 3.3 ms). All remaining nerves  were within normal limits.   Left vs. Right side comparison data for the tibial motor nerve indicates abnormal L-R velocity difference (Knee-Ankle, 12 m/s). All examined muscles (as indicated in the following table) showed no evidence of electrical instability. Impression:  Sensory responses were absent in both lower extremities. Palmar mixed response for median and ulnar nerves had delayed peak latency. Tibial motor response was of low amplitude bilaterally. Median and ulnar motor responses were normal.   Concentric needle EMG of selected muscles of both lower extremities was normal.     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 No evidence to suggest a lumbosacral radiculopathy on either side.      Recommendations:  Neuropathy screening workup    ___________________________  Marycarmen Reeves MD        Nerve Conduction Studies  Anti Sensory Summary Table     Stim Site NR Peak (ms) Norm Peak (ms) P-T Amp (µV) Norm P-T Amp Onset (ms) Site1 Site2 Delta-P (ms) Dist (cm) Umair (m/s) Norm Umair (m/s)   Right Median Anti Sensory (2nd Digit)  31.1°C   Wrist    3.6 <3.6 24.7 >10 2.1 Wrist 2nd Digit 3.6 14.0 39 >39   Right Radial Anti Sensory (Base 1st Digit)  31.2°C   Wrist    2.6 <3.1 36.3  2.0 Wrist Base 1st Digit 2.6 0.0     Left Sup Peroneal Anti Sensory (Ant Lat Mall)  29.3°C   14 cm NR  <4.4  >5.0  14 cm Ant Lat Mall  14.0  >32   Right Sup Peroneal Anti Sensory (Ant Lat Mall)  30.9°C   14 cm NR  <4.4  >5.0  14 cm Ant Lat Mall  14.0  >32   Left Sural Anti Sensory (Lat Mall)  29.4°C   Calf NR  <4.0  >5.0  Calf Lat Mall  14.0  >35   Right Sural Anti Sensory (Lat Mall)  31°C   Calf NR  <4.0  >5.0  Calf Lat Mall  14.0  >35   Right Ulnar Anti Sensory (5th Digit)  31.1°C   Wrist    4.4 <3.7 12.5 >15.0 3.6 Wrist 5th Digit 4.4 14.0 32 >38     Motor Summary Table     Stim Site NR Onset (ms) Norm Onset (ms) O-P Amp (mV) Norm O-P Amp Site1 Site2 Delta-0 (ms) Dist (cm) Umair (m/s) Norm Umair (m/s)   Right Median Motor (Abd Poll Brev)  31°C   Wrist    3.5 <4.2 7.4 >5 Elbow Wrist 5.6 31.0 55 >50   Elbow    9.1  7.2          Left Peroneal Motor (Ext Dig Brev)  28.7°C   Ankle    4.1 <6.1 3.2 >2.5 B Fib Ankle 7.2 37.0 51 >38   B Fib    11.3  2.7  Poplt B Fib 2.5 10.0 40 >40   Poplt    13.8  2.7          Right Peroneal Motor (Ext Dig Brev)  30.4°C   Ankle    4.5 <6.1 3.2 >2.5 B Fib Ankle 8.9 38.0 43 >38   B Fib    13.4  2.7  Poplt B Fib 1.6 10.0 63 >40   Poplt    15.0  2.6          Left Tibial Motor (Abd Herndon Brev)  29°C   Ankle    5.2 <6.1 2.1 >3.0 Knee Ankle 9.8 44.0 45 >35   Knee    15.0  2.0          Right Tibial Motor (Abd Herndon Brev)  30.7°C   Ankle    5.0 <6.1 2.5 >3.0 Knee Ankle 12.1 40.0 33 >35   Knee    17.1  2.4          Right Ulnar Motor (Abd Dig Minimi)  31.1°C   Wrist    3.2 <4.2 8.6 >3 B Elbow Wrist 4.9 30.0 61 >53   B Elbow    8.1  3.9  A Elbow B Elbow 1.4 10.0 71 >53   A Elbow    9.5  3.6            Comparison Summary Table     Stim Site NR Peak (ms) Norm Peak (ms) P-T Amp (µV) Site1 Site2 Delta-P (ms) Norm Delta (ms)   Right Median/Ulnar Palm Comparison (Wrist - 8cm)  31.2°C   Median Palm    3.2 <2.5 8.2 Median Palm Ulnar Palm 0.1 <0.3   Ulnar Palm    3.3 <2.5 7.9         EMG     Side Muscle Nerve Root Ins Act Fibs Psw Amp Dur Poly Recrt Int Pat Comment   Right AntTibialis Dp Br Peronel L4-5 Nml Nml Nml Nml Nml 0 Nml Nml    Right Peroneus Long Sup Br Peronel L5-S1 Nml Nml Nml Nml Nml 0 Nml Nml    Right Gastroc Tibial S1-2 Nml Nml Nml Nml Nml 0 Nml Nml    Right Flex Dig Long Tibial L5-S2 Nml Nml Nml Nml Nml 0 Nml Nml    Right VastusLat Femoral L2-4 Nml Nml Nml Nml Nml 0 Nml Nml    Right Abd Poll Brev Median C8-T1 Nml Nml Nml Nml Nml 0 Nml Nml    Right 1stDorInt Ulnar C8-T1 Nml Nml Nml Nml Nml 0 Nml Nml    Right BrachioRad Radial C5-6 Nml Nml Nml Nml Nml 0 Nml Nml    Right PronatorTeres Median C6-7 Nml Nml Nml Nml Nml 0 Nml Nml    Right Triceps Radial C6-7-8 Nml Nml Nml Nml Nml 0 Nml Nml    Right Deltoid Axillary C5-6 Nml Nml Nml Nml Nml 0 Nml Nml Waveforms:

## 2018-10-28 ENCOUNTER — TELEPHONE (OUTPATIENT)
Dept: NEUROLOGY | Age: 58
End: 2018-10-28

## 2018-10-28 DIAGNOSIS — G62.9 NEUROPATHY: Primary | ICD-10-CM

## 2018-10-28 NOTE — TELEPHONE ENCOUNTER
Herberth Nuñez - Please call pt: NCS/EMG on 10/18/18 with Dr. Saba Trejo revealed a peripheral neuropathy involving sensory>motor nerves. Recommend lab work to try to determine the cause. I have ordered labs.      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

## 2018-10-30 LAB
ANA SER QL: NEGATIVE
EST. AVERAGE GLUCOSE BLD GHB EST-MCNC: 126 MG/DL
HBA1C MFR BLD: 6 % (ref 4.8–5.6)
SEE BELOW:, 164879: NORMAL

## 2018-10-31 LAB
ALBUMIN SERPL ELPH-MCNC: 3.7 G/DL (ref 2.9–4.4)
ALBUMIN/GLOB SERPL: 1.1 {RATIO} (ref 0.7–1.7)
ALPHA1 GLOB SERPL ELPH-MCNC: 0.2 G/DL (ref 0–0.4)
ALPHA2 GLOB SERPL ELPH-MCNC: 0.6 G/DL (ref 0.4–1)
B-GLOBULIN SERPL ELPH-MCNC: 1.2 G/DL (ref 0.7–1.3)
GAMMA GLOB SERPL ELPH-MCNC: 1.7 G/DL (ref 0.4–1.8)
GLOBULIN SER-MCNC: 3.7 G/DL (ref 2.2–3.9)
IGA SERPL-MCNC: 204 MG/DL (ref 90–386)
IGG SERPL-MCNC: 1999 MG/DL (ref 700–1600)
IGM SERPL-MCNC: 47 MG/DL (ref 20–172)
INTERPRETATION SERPL IEP-IMP: ABNORMAL
M PROTEIN SERPL ELPH-MCNC: ABNORMAL G/DL
PLEASE NOTE:, 149534: ABNORMAL
PROT SERPL-MCNC: 7.4 G/DL (ref 6–8.5)
RPR SER QL: NON REACTIVE
VIT B6 SERPL-MCNC: 17.2 UG/L (ref 5.3–46.7)

## 2018-11-11 DIAGNOSIS — G62.9 NEUROPATHY: ICD-10-CM

## 2018-11-12 ENCOUNTER — TELEPHONE (OUTPATIENT)
Dept: NEUROLOGY | Age: 58
End: 2018-11-12

## 2018-11-12 ENCOUNTER — OFFICE VISIT (OUTPATIENT)
Dept: NEUROLOGY | Age: 58
End: 2018-11-12

## 2018-11-12 VITALS
DIASTOLIC BLOOD PRESSURE: 70 MMHG | RESPIRATION RATE: 18 BRPM | HEIGHT: 73 IN | SYSTOLIC BLOOD PRESSURE: 128 MMHG | OXYGEN SATURATION: 97 % | WEIGHT: 202 LBS | HEART RATE: 74 BPM | BODY MASS INDEX: 26.77 KG/M2

## 2018-11-12 DIAGNOSIS — R73.03 PREDIABETES: ICD-10-CM

## 2018-11-12 DIAGNOSIS — G60.8 IDIOPATHIC SMALL AND LARGE FIBER SENSORY NEUROPATHY: Primary | ICD-10-CM

## 2018-11-12 NOTE — PROGRESS NOTES
Date:            18     Name:  Teo Blevins  :  1960  MRN:  [id number]     PCP:  Lindsey Villarreal PA-C    Chief Complaint   Patient presents with    Headache         HISTORY OF PRESENT ILLNESS:  Jenifer Roldan is a 59 y.o., male who presents today for follow up for paresthesias. He has been following with Dr. Loly Clark for these, seen as a new patient last month. He reports onset of symptoms while hospitalized for DVT/PE in May 2018. Per records, he had unprovoked right DVT in , 6 months of Coumadin. In  he had another right leg DVT, treated with Lovenox and bridged to 6 months of Coumadin. 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 He was then admitted in May 2018 with shortness of breath and found to have bilateral PE, he is now on Eliquis. He came to see Dr. Loly Clark because he was experiencing a crawling type feeling under his skin and numbness in the right greater than left hand. Right hand numbness involves all fingers, predominantly the thumb. No complaints in his feet. Because he had recently been started on Eliquis, Dr. Loly Clark was concerned for drug reaction, but he had an EMG with Dr. Wicho Novoa that suggested moderate to severe distal, symmetric, axonal type of sensory greater than motor neuropathy. No evidence of radiculopathy. Dr. Loly Clark ordered additional lab work to evaluate for treatable causes of this, A1c prediabetic at 6.0 (patient was not aware of that), B6 normal, no M spike on SPEP, RPR nonreactive, RAYMUNDO negative, anti-hu and Sjogren's not completed for some reason. The lab was called and these were not collected. TSH, B12, magnesium, folate, CMP normal when checked in August. He has no family history of inherited neuropathies. His mother does have diabetic neuropathy. Symptoms are not painful, just a little bothersome at this point.   They are mild enough that he does not want any medication to help with the symptoms. He does complain at this point a predominantly numbness rather than pain. 10.11.2018 recap   Woo Acosta is a 59 y.o. right handed male who presents in consultation for paresthesias. Pt reports onset of sxs while hospitalized for DVT/PE in May, 2018. It is a crawling type feeling under his skin and numbness R>>L hands. In the last couple of weeks has had diffuse itching, becoming painful with rubbing. Not severe, not causing him to take anything for it or keeping him awake. Right hand numbness involves all fingers, but more so thumb. No numbness in feet. No neck pain. He was started on Eliquis during hospitalization. He has h/o right LE DVT 10 years ago. Had gout around the same time, took med for a short time. Labs 8/9/18 - CBC with diff, CMP, HCV, B12, TSH - all unremarkable. 3/16/18 - . Hypercoag panel 11/20/13 - Elevated Factor VIII activity. Current Outpatient Medications   Medication Sig    cyclobenzaprine (FLEXERIL) 10 mg tablet Take 1 Tab by mouth nightly.  chlorthalidone (HYGROTEN) 25 mg tablet Take 1 Tab by mouth daily.  apixaban (ELIQUIS) 5 mg tablet TAKE 1 TALBET BY MOUTH 2 TIMES A DAY    Cholecalciferol, Vitamin D3, 1,000 unit cap Take 1,000 Units by mouth daily. No current facility-administered medications for this visit.       Allergies   Allergen Reactions    Levaquin [Levofloxacin] Rash     Past Medical History:   Diagnosis Date    BPH (benign prostatic hyperplasia)     Gout     Hypertension     Thromboembolus ([facility] [address]     2009 & 2012     Past Surgical History:   Procedure Laterality Date    HX COLONOSCOPY  2012     Social History     Socioeconomic History    Marital status:      Spouse name: Not on file    Number of children: Not on file    Years of education: Not on file    Highest education level: Not on file   Social Needs    Financial resource strain: Not on file    Food insecurity - worry: Not on file   [address] [place] [address] Food insecurity - inability: Not on file    Transportation needs - medical: Not on file   [facility] needs - non-medical: Not on file   Occupational History    Occupation: Computer safety at [address] Smoking status: Never Smoker    Smokeless tobacco: Never Used   Substance and Sexual Activity    Alcohol use: No    Drug use: No    Sexual activity: Yes     Partners: Female     Comment:  [employer]   Other Topics Concern    Not on file   Social History Narrative    Lives in [place] with wife     Family History   Problem Relation Age of Onset    Cancer Mother         multiple myeloma    Diabetes Mother     Hypertension Mother     Stroke Mother     Cancer Father         pancreatic    Heart Disease Father     Hypertension Father     No Known Problems Brother     Hypertension Brother         Resolved    Other Brother         Sarcoid    No Known Problems Son     No Known Problems Daughter          PHYSICAL EXAMINATION:    Visit Vitals  /70   Pulse 74   Resp 18   Ht 6' 1\" (1.854 m)   Wt 91.6 kg (202 lb)   SpO2 97%   BMI 26.65 kg/m²     General:  Well defined, nourished, and groomed individual in no acute distress. Neck: Supple, nontender, no bruits. Heart: Regular rate and rhythm, no murmurs, rub, or gallop. Normal S1S2. Lungs:  Clear to auscultation bilaterally with equal chest expansion, no cough, no wheeze  Musculoskeletal:  Extremities revealed no edema and had full range of motion of joints. Psych:  Good mood and bright affect    NEUROLOGICAL EXAMINATION:     Mental Status:   Alert and oriented to person, place, and time with recent and remote memory intact. Attention span and concentration are normal. Speech is fluent with a full fund of knowledge. Cranial Nerves:    II, III, IV, VI:  Visual acuity grossly intact. Extra-ocular movements are full and fluid. No ptosis. V-XII: Hearing is grossly intact.   Facial features are symmetric, with normal sensation and strength. The palate rises symmetrically and the tongue protrudes midline. Sternocleidomastoids 5/5. Motor Examination: Normal tone, bulk, and strength, 5/5 muscle strength throughout. Coordination:  Finger to nose testing was normal.   No resting or intention tremor  Gait and Station:  Steady while walking and with tandem walking. Normal arm swing. No pronator drift. No muscle wasting or fasciculations noted. ASSESSMENT AND PLAN    ICD-10-CM ICD-9-CM    1. Idiopathic small and large fiber sensory neuropathy G60.8 356.4    2. Prediabetes R73.03 5.34      68-year-old male with history of multiple DVTs, PE who was admitted in May 2018 for DVT and PE and put on Eliquis. Around that time he started experiencing paresthesias in his hands. EMG/NCS suggestive of moderate to severe distal symmetric axonal type of sensory greater than motor polyneuropathy. Lab work has been unremarkable aside from prediabetic A1c at 6.0. Patient's complaint today remains primarily numbness in his hands, no pain. He is not interested in treatment. He is not interested in genetic workup at this time, especially if it would not change treatment. 1.  Reordered Sjogren's and anti-hu as they were not done the first time  2. Patient will call if he develops any painful symptoms, could try gabapentin  3. Did discuss prediabetic diagnosis, he will follow with PCP for dietitian recommendations  4. Can order genetic testing if patient changes his mind, he will call if he wants that done    Follow-up in January as scheduled with Dr. Coco Robin NP    This note was created using voice recognition software. Despite editing, there may be syntax errors.

## 2018-11-12 NOTE — PATIENT INSTRUCTIONS
[facility]   Statement to Patients  April 1, 2014      In an effort to ensure the large volume of patient prescription refills is processed in the most efficient and expeditious manner, we are asking our patients to assist us by calling your Pharmacy for all prescription refills, this will include also your  Mail Order Pharmacy. The pharmacy will contact our office electronically to continue the refill process. Please do not wait until the last minute to call your pharmacy. We need at least 48 hours (2days) to fill prescriptions. We also encourage you to call your pharmacy before going to  your prescription to make sure it is ready. With regard to controlled substance prescription refill requests (narcotic refills) that need to be picked up at our office, we ask your cooperation by providing us with at least 72 hours (3days) notice that you will need a refill. We will not refill narcotic prescription refill requests after 4:00pm on any weekday, Monday through Thursday, or after 2:00pm on Fridays, or on the weekends. We encourage everyone to explore another way of getting your prescription refill request processed using Genomas, our patient web portal through our electronic medical record system. Genomas is an efficient and effective way to communicate your medication request directly to the office and  downloadable as an irma on your smart phone . Genomas also features a review functionality that allows you to view your medication list as well as leave messages for your physician. Are you ready to get connected? If so please review the attatched instructions or speak to any of our staff to get you set up right away! Thank you so much for your cooperation. Should you have any questions please contact our Practice Administrator.     The Physicians and Staff,  [facility]                A Healthy Lifestyle: Care Instructions  Your Care Instructions    A healthy lifestyle can help you feel good, stay at a healthy weight, and have plenty of energy for both work and play. A healthy lifestyle is something you can share with your whole family. A healthy lifestyle also can lower your risk for serious health problems, such as high blood pressure, heart disease, and diabetes. You can follow a few steps listed below to improve your health and the health of your family. Follow-up care is a key part of your treatment and safety. Be sure to make and go to all appointments, and call your doctor if you are having problems. It's also a good idea to know your test results and keep a list of the medicines you take. How can you care for yourself at home? · Do not eat too much sugar, fat, or fast foods. You can still have dessert and treats now and then. The goal is moderation. · Start small to improve your eating habits. Pay attention to portion sizes, drink less juice and soda pop, and eat more fruits and vegetables. ? Eat a healthy amount of food. A 3-ounce serving of meat, for example, is about the size of a deck of cards. Fill the rest of your plate with vegetables and whole grains. ? Limit the amount of soda and sports drinks you have every day. Drink more water when you are thirsty. ? Eat at least 5 servings of fruits and vegetables every day. It may seem like a lot, but it is not hard to reach this goal. A serving or helping is 1 piece of fruit, 1 cup of vegetables, or 2 cups of leafy, raw vegetables. Have an apple or some carrot sticks as an afternoon snack instead of a candy bar. Try to have fruits and/or vegetables at every meal.  · Make exercise part of your daily routine. You may want to start with simple activities, such as walking, bicycling, or slow swimming. Try to be active 30 to 60 minutes every day. You do not need to do all 30 to 60 minutes all at once. For example, you can exercise 3 times a day for 10 or 20 minutes.  Moderate exercise is safe for most people, but it is always a good idea to talk to your doctor before starting an exercise program.  · Keep moving. Gabe Cricketner the lawn, work in the garden, or Mixpo. Take the stairs instead of the elevator at work. · If you smoke, quit. People who smoke have an increased risk for heart attack, stroke, cancer, and other lung illnesses. Quitting is hard, but there are ways to boost your chance of quitting tobacco for good. ? Use nicotine gum, patches, or lozenges. ? Ask your doctor about stop-smoking programs and medicines. ? Keep trying. In addition to reducing your risk of diseases in the future, you will notice some benefits soon after you stop using tobacco. If you have shortness of breath or asthma symptoms, they will likely get better within a few weeks after you quit. · Limit how much alcohol you drink. Moderate amounts of alcohol (up to 2 drinks a day for men, 1 drink a day for women) are okay. But drinking too much can lead to liver problems, high blood pressure, and other health problems. Family health  If you have a family, there are many things you can do together to improve your health. · Eat meals together as a family as often as possible. · Eat healthy foods. This includes fruits, vegetables, lean meats and dairy, and whole grains. · Include your family in your fitness plan. Most people think of activities such as jogging or tennis as the way to fitness, but there are many ways you and your family can be more active. Anything that makes you breathe hard and gets your heart pumping is exercise. Here are some tips:  ? Walk to do errands or to take your child to school or the bus.  ? Go for a family bike ride after dinner instead of watching TV. Where can you learn more? Go to http://chai-anahy.info/. Enter O936 in the search box to learn more about \"A Healthy Lifestyle: Care Instructions. \"  Current as of: December 7, 2017  Content Version: 11.8  © 5611-5753 Healthwise, Incorporated. Care instructions adapted under license by [employer] (which disclaims liability or warranty for this information). If you have questions about a medical condition or this instruction, always ask your healthcare professional. [address] any warranty or liability for your use of this information.

## 2018-11-12 NOTE — TELEPHONE ENCOUNTER
Called [facility] and spoke with Gerardo. She stated, per their records, they do not see where patient had the Sjogren's ABS and the misc. Lab test drawn.

## 2018-11-13 NOTE — TELEPHONE ENCOUNTER
Spoke with patient and informed him that [facility] did not have record of the Sjorgren's ABS and the misc. Lab test. Advised he will need to have these labs drawn and to take the lab orders he was given yesterday at his office visit to his nearest [facility]. Patient was given an opportunity to ask questions, repeated information, and verbalized understanding.

## 2018-11-13 NOTE — TELEPHONE ENCOUNTER
----- Message from Pam Vázquez sent at 11/13/2018 10:15 AM EST -----  Regarding: MILENA Cortez/Telephone  Pt stated he received a call from the office, but could not understand the message. Best contact number [phone] or [phone].

## 2018-11-14 ENCOUNTER — TELEPHONE (OUTPATIENT)
Dept: NEUROLOGY | Age: 58
End: 2018-11-14

## 2018-11-14 ENCOUNTER — APPOINTMENT (OUTPATIENT)
Dept: CT IMAGING | Age: 58
End: 2018-11-14
Attending: PHYSICIAN ASSISTANT
Payer: COMMERCIAL

## 2018-11-14 ENCOUNTER — HOSPITAL ENCOUNTER (EMERGENCY)
Age: 58
Discharge: HOME | End: 2018-11-14
Attending: EMERGENCY MEDICINE
Payer: COMMERCIAL

## 2018-11-14 VITALS
RESPIRATION RATE: 16 BRPM | TEMPERATURE: 98.1 F | DIASTOLIC BLOOD PRESSURE: 107 MMHG | HEART RATE: 65 BPM | SYSTOLIC BLOOD PRESSURE: 179 MMHG | OXYGEN SATURATION: 100 %

## 2018-11-14 DIAGNOSIS — M25.562 ARTHRALGIA OF LEFT LOWER LEG: Primary | ICD-10-CM

## 2018-11-14 LAB
ANION GAP BLD CALC-SCNC: 18 MMOL/L (ref 10–20)
APTT PPP: 26.8 SEC (ref 22.1–32)
ATRIAL RATE: 71 BPM
BUN BLD-MCNC: 13 MG/DL (ref 9–20)
CA-I BLD-MCNC: 1.24 MMOL/L (ref 1.12–1.32)
CALCULATED P AXIS, ECG09: 70 DEGREES
CALCULATED R AXIS, ECG10: -57 DEGREES
CALCULATED T AXIS, ECG11: 66 DEGREES
CHLORIDE BLD-SCNC: 95 MMOL/L (ref 98–107)
CO2 BLD-SCNC: 32 MMOL/L (ref 21–32)
COMMENT, HOLDF: NORMAL
CREAT BLD-MCNC: 1.1 MG/DL (ref 0.6–1.3)
DIAGNOSIS, 93000: NORMAL
GLUCOSE BLD-MCNC: 97 MG/DL (ref 65–100)
HCT VFR BLD CALC: 49 % (ref 36.6–50.3)
INR PPP: 1.1 (ref 0.9–1.1)
P-R INTERVAL, ECG05: 172 MS
POTASSIUM BLD-SCNC: 3.2 MMOL/L (ref 3.5–5.1)
PROTHROMBIN TIME: 11 SEC (ref 9–11.1)
Q-T INTERVAL, ECG07: 378 MS
QRS DURATION, ECG06: 114 MS
QTC CALCULATION (BEZET), ECG08: 410 MS
SAMPLES BEING HELD,HOLD: NORMAL
SERVICE CMNT-IMP: ABNORMAL
SODIUM BLD-SCNC: 141 MMOL/L (ref 136–145)
THERAPEUTIC RANGE,PTTT: NORMAL SECS (ref 58–77)
TROPONIN I SERPL-MCNC: <0.05 NG/ML
VENTRICULAR RATE, ECG03: 71 BPM

## 2018-11-14 PROCEDURE — 85730 THROMBOPLASTIN TIME PARTIAL: CPT

## 2018-11-14 PROCEDURE — 36415 COLL VENOUS BLD VENIPUNCTURE: CPT

## 2018-11-14 PROCEDURE — 99283 EMERGENCY DEPT VISIT LOW MDM: CPT

## 2018-11-14 PROCEDURE — 80047 BASIC METABLC PNL IONIZED CA: CPT

## 2018-11-14 PROCEDURE — 85610 PROTHROMBIN TIME: CPT

## 2018-11-14 PROCEDURE — 84484 ASSAY OF TROPONIN QUANT: CPT

## 2018-11-14 PROCEDURE — 93005 ELECTROCARDIOGRAM TRACING: CPT

## 2018-11-14 PROCEDURE — 93971 EXTREMITY STUDY: CPT

## 2018-11-14 PROCEDURE — 74011636320 HC RX REV CODE- 636/320: Performed by: RADIOLOGY

## 2018-11-14 PROCEDURE — 71275 CT ANGIOGRAPHY CHEST: CPT

## 2018-11-14 PROCEDURE — 74011000258 HC RX REV CODE- 258: Performed by: RADIOLOGY

## 2018-11-14 RX ORDER — SODIUM CHLORIDE 0.9 % (FLUSH) 0.9 %
10 SYRINGE (ML) INJECTION
Status: COMPLETED | OUTPATIENT
Start: 2018-11-14 | End: 2018-11-14

## 2018-11-14 RX ADMIN — IOPAMIDOL 100 ML: 755 INJECTION, SOLUTION INTRAVENOUS at 13:04

## 2018-11-14 RX ADMIN — Medication 10 ML: at 13:04

## 2018-11-14 RX ADMIN — SODIUM CHLORIDE 100 ML: 900 INJECTION, SOLUTION INTRAVENOUS at 13:04

## 2018-11-14 NOTE — ED PROVIDER NOTES
59 y.o. male with past medical history significant for HTN, BPH, gout, blood clot (on Eliquis followed by Dr. Koki Robison) presents with complaints of left lower leg pain which started this past Monday. The pt states that bearing weight makes the pain worse. The pt rates the pain as a 6/10 in severity. The pt describes the pain as a dull ache. The pt denies taking anything at home for the discomfort. There are no other acute medical complaints at this time PCP: Jewel Goltz, PA-C Nohemi Shark, PA-C Past Medical History:  
Diagnosis Date  BPH (benign prostatic hyperplasia)  Gout  Hypertension  Thromboembolus ([address] 2009 & 2012 Past Surgical History:  
Procedure Laterality Date  HX COLONOSCOPY  2012 Family History:  
Problem Relation Age of Onset  Cancer Mother   
     multiple myeloma  Diabetes Mother  Hypertension Mother  Stroke Mother  Cancer Father   
     pancreatic  Heart Disease Father  Hypertension Father  No Known Problems Brother  Hypertension Brother Resolved  Other Brother Sarcoid  No Known Problems Son  No Known Problems Daughter Social History Socioeconomic History  Marital status:  Spouse name: Not on file  Number of children: Not on file  Years of education: Not on file  Highest education level: Not on file Social Needs  Financial resource strain: Not on file  Food insecurity - worry: Not on file  Food insecurity - inability: Not on file  Transportation needs - medical: Not on file  Transportation needs - non-medical: Not on file Occupational History  Occupation: Computer safety at [employer] Tobacco Use  Smoking status: Never Smoker  Smokeless tobacco: Never Used Substance and Sexual Activity  Alcohol use: No  
 Drug use: No  
 Sexual activity: Yes  
  Partners: Female Comment:  [employer] Other Topics Concern  Not on file Social History Narrative Lives in [facility] with wife ALLERGIES: Levaquin [levofloxacin] Review of Systems Constitutional: Negative for chills, diaphoresis and fever. HENT: Negative for congestion, postnasal drip, rhinorrhea and sore throat. Eyes: Negative for photophobia, discharge, redness and visual disturbance. Respiratory: Negative for cough, chest tightness, shortness of breath and wheezing. Cardiovascular: Negative for chest pain, palpitations and leg swelling. Gastrointestinal: Negative for abdominal distention, abdominal pain, blood in stool, constipation, diarrhea, nausea and vomiting. Genitourinary: Negative for difficulty urinating, dysuria, frequency, hematuria and urgency. Musculoskeletal: Positive for arthralgias and myalgias. Negative for back pain and joint swelling. Skin: Negative for color change and rash. Neurological: Negative for dizziness, speech difficulty, weakness, light-headedness, numbness and headaches. Psychiatric/Behavioral: Negative for confusion. The patient is not nervous/anxious. All other systems reviewed and are negative. Vitals:  
 11/14/18 1157 11/14/18 1209 11/14/18 1555 BP:  151/84 (!) 179/107 Pulse: 78 93 65 Resp:  18 16 Temp:  97.8 °F (36.6 °C) 98.1 °F (36.7 °C) SpO2: 98% 100% 100% Physical Exam  
Constitutional: He is oriented to person, place, and time. He appears well-developed and well-nourished. No distress. HENT:  
Head: Normocephalic and atraumatic. Head is without raccoon's eyes, without Hoover's sign and without laceration. Right Ear: Hearing, tympanic membrane, external ear and ear canal normal. No foreign bodies. Tympanic membrane is not bulging. No hemotympanum. Left Ear: Hearing, tympanic membrane, external ear and ear canal normal. No foreign bodies. Tympanic membrane is not bulging. No hemotympanum. Nose: Nose normal. No mucosal edema or rhinorrhea. Right sinus exhibits no maxillary sinus tenderness and no frontal sinus tenderness. Left sinus exhibits no maxillary sinus tenderness and no frontal sinus tenderness. Mouth/Throat: Uvula is midline, oropharynx is clear and moist and mucous membranes are normal. No tonsillar abscesses. Eyes: Conjunctivae and EOM are normal. Pupils are equal, round, and reactive to light. Right eye exhibits no discharge. Left eye exhibits no discharge. Neck: Normal range of motion. Neck supple. Cardiovascular: Normal rate, regular rhythm and normal heart sounds. Exam reveals no gallop and no friction rub. No murmur heard. Regular rate and rhythm. No murmurs, gallops, rubs, or clicks. Pulmonary/Chest: Effort normal and breath sounds normal. No respiratory distress. He has no wheezes. He has no rales. No stridor or wheezes. No accessory muscle usage. No nasal flaring. Breath Sounds equal bilaterally. Abdominal: Soft. Bowel sounds are normal. He exhibits no distension. There is no tenderness. There is no rebound and no guarding. No abdominal Bruits. No pulsatile mass. No abdominal scars. Active bowel sounds. Musculoskeletal: Normal range of motion. He exhibits no edema, tenderness or deformity. Neurological: He is alert and oriented to person, place, and time. Skin: He is not diaphoretic. Nursing note and vitals reviewed. MDM Number of Diagnoses or Management Options Arthralgia of left lower leg:  
Diagnosis management comments: Pt afebrile and nontoxic appearing. US revealed clot which is smaller than when he was initially dx with DVT. CTA of chest negative for PE. Pt advised to follow up with his hematologist for further evaluation of symptoms. Alda Coronel PA-C Procedures

## 2018-11-14 NOTE — DISCHARGE INSTRUCTIONS
Knee Pain or Injury: Care Instructions  Your Care Instructions    Injuries are a common cause of knee problems. Sudden (acute) injuries may be caused by a direct blow to the knee. They can also be caused by abnormal twisting, bending, or falling on the knee. Pain, bruising, or swelling may be severe, and may start within minutes of the injury. Overuse is another cause of knee pain. Other causes are climbing stairs, kneeling, and other activities that use the knee. Everyday wear and tear, especially as you get older, also can cause knee pain. Rest, along with home treatment, often relieves pain and allows your knee to heal. If you have a serious knee injury, you may need tests and treatment. Follow-up care is a key part of your treatment and safety. Be sure to make and go to all appointments, and call your doctor if you are having problems. It's also a good idea to know your test results and keep a list of the medicines you take. How can you care for yourself at home? · Be safe with medicines. Read and follow all instructions on the label. ? If the doctor gave you a prescription medicine for pain, take it as prescribed. ? If you are not taking a prescription pain medicine, ask your doctor if you can take an over-the-counter medicine. · Rest and protect your knee. Take a break from any activity that may cause pain. · Put ice or a cold pack on your knee for 10 to 20 minutes at a time. Put a thin cloth between the ice and your skin. · Prop up a sore knee on a pillow when you ice it or anytime you sit or lie down for the next 3 days. Try to keep it above the level of your heart. This will help reduce swelling. · If your knee is not swollen, you can put moist heat, a heating pad, or a warm cloth on your knee. · If your doctor recommends an elastic bandage, sleeve, or other type of support for your knee, wear it as directed.   · Follow your doctor's instructions about how much weight you can put on your leg. Use a cane, crutches, or a walker as instructed. · Follow your doctor's instructions about activity during your healing process. If you can do mild exercise, slowly increase your activity. · Reach and stay at a healthy weight. Extra weight can strain the joints, especially the knees and hips, and make the pain worse. Losing even a few pounds may help. When should you call for help? Call 911 anytime you think you may need emergency care. For example, call if:    · You have symptoms of a blood clot in your lung (called a pulmonary embolism). These may include:  ? Sudden chest pain. ? Trouble breathing. ? Coughing up blood.    Call your doctor now or seek immediate medical care if:    · You have severe or increasing pain.     · Your leg or foot turns cold or changes color.     · You cannot stand or put weight on your knee.     · Your knee looks twisted or bent out of shape.     · You cannot move your knee.     · You have signs of infection, such as:  ? Increased pain, swelling, warmth, or redness. ? Red streaks leading from the knee. ? Pus draining from a place on your knee. ? A fever.     · You have signs of a blood clot in your leg (called a deep vein thrombosis), such as:  ? Pain in your calf, back of the knee, thigh, or groin. ? Redness and swelling in your leg or groin.    Watch closely for changes in your health, and be sure to contact your doctor if:    · You have tingling, weakness, or numbness in your knee.     · You have any new symptoms, such as swelling.     · You have bruises from a knee injury that last longer than 2 weeks.     · You do not get better as expected. Where can you learn more? Go to http://chai-anahy.info/. Enter K195 in the search box to learn more about \"Knee Pain or Injury: Care Instructions. \"  Current as of: November 20, 2017  Content Version: 11.8  © 2973-1603 Healthwise, [employer].  Care instructions adapted under license by [facility] (which disclaims liability or warranty for this information). If you have questions about a medical condition or this instruction, always ask your healthcare professional. [address] any warranty or liability for your use of this information. We hope that we have addressed all of your medical concerns. The examination and treatment you received in the Emergency Department were for an emergent problem and were not intended as complete care. It is important that you follow up with your healthcare provider(s) for ongoing care. If your symptoms worsen or do not improve as expected, and you are unable to reach your usual health care provider(s), you should return to the Emergency Department. Today's healthcare is undergoing tremendous change, and patient satisfaction surveys are one of the many tools to assess the quality of medical care. You may receive a survey from the [employer] regarding your experience in the Emergency Department. I hope that your experience has been completely positive, particularly the medical care that I provided. As such, please participate in the survey; anything less than excellent does not meet my expectations or intentions. [address] [facility] and [address] [place] [address] participate in nationally recognized quality of care measures. If your blood pressure is greater than 120/80, as reported below, we urge that you seek medical care to address the potential of high blood pressure, commonly known as hypertension. Hypertension can be hereditary or can be caused by certain medical conditions, pain, stress, or \"white coat syndrome. \"       Please make an appointment with your health care provider(s) for follow up of your Emergency Department visit.        VITALS:   Patient Vitals for the past 8 hrs:   Temp Pulse Resp BP SpO2   11/14/18 1209 97.8 °F (36.6 °C) 93 18 151/84 100 %   11/14/18 1157 -- 78 -- -- 98 %          Thank you for allowing us to provide you with medical care today. We realize that you have many choices for your emergency care needs. Please choose us in the future for any continued health care needs. Susan Skiff Elenita Zion, [address] [place] [address].   Office: [phone]            Recent Results (from the past 24 hour(s))   EKG, 12 LEAD, INITIAL    Collection Time: 11/14/18 12:19 PM   Result Value Ref Range    Ventricular Rate 71 BPM    Atrial Rate 71 BPM    P-R Interval 172 ms    QRS Duration 114 ms    Q-T Interval 378 ms    QTC Calculation (Bezet) 410 ms    Calculated P Axis 70 degrees    Calculated R Axis -57 degrees    Calculated T Axis 66 degrees    Diagnosis       Normal sinus rhythm  Left anterior fascicular block  Nonspecific ST and T wave abnormality  When compared with ECG of 23-MAY-2018 04:10,  premature ventricular complexes are no longer present     POC CHEM8    Collection Time: 11/14/18 12:29 PM   Result Value Ref Range    Calcium, ionized (POC) 1.24 1.12 - 1.32 mmol/L    Sodium (POC) 141 136 - 145 mmol/L    Potassium (POC) 3.2 (L) 3.5 - 5.1 mmol/L    Chloride (POC) 95 (L) 98 - 107 mmol/L    CO2 (POC) 32 21 - 32 mmol/L    Anion gap (POC) 18 10 - 20 mmol/L    Glucose (POC) 97 65 - 100 mg/dL    BUN (POC) 13 9 - 20 mg/dL    Creatinine (POC) 1.1 0.6 - 1.3 mg/dL    GFRAA, POC >60 >60 ml/min/1.73m2    GFRNA, POC >60 >60 ml/min/1.73m2    Hematocrit (POC) 49 36.6 - 50.3 %    Comment Notified RN or MD immediately by      TROPONIN I    Collection Time: 11/14/18 12:30 PM   Result Value Ref Range    Troponin-I, Qt. <0.05 <0.05 ng/mL   SAMPLES BEING HELD    Collection Time: 11/14/18 12:30 PM   Result Value Ref Range    SAMPLES BEING HELD 1RED,1LAV     COMMENT        Add-on orders for these samples will be processed based on acceptable specimen integrity and analyte stability, which may vary by analyte.    PROTHROMBIN TIME + INR    Collection Time: 11/14/18 12:30 PM   Result Value Ref Range    INR 1.1 0.9 - 1.1      Prothrombin time 11.0 9.0 - 11.1 sec   PTT    Collection Time: 11/14/18 12:30 PM   Result Value Ref Range    aPTT 26.8 22.1 - 32.0 sec    aPTT, therapeutic range     58.0 - 77.0 SECS       Cta Chest W Or W Wo Cont    Result Date: 11/14/2018  EXAM:  CTA CHEST W OR W WO CONT INDICATION:   hx of dvt; shortness of breath COMPARISON: 5/22/2018. CONTRAST:  100 mL of Isovue-370. TECHNIQUE: Precontrast  images were obtained to localize the volume for acquisition. Multislice helical CT arteriography was performed from the diaphragm to the thoracic inlet during uneventful rapid bolus intravenous contrast administration. Lung and soft tissue windows were generated. Coronal and sagittal images were generated and 3D post processing consisting of coronal maximum intensity images was performed. CT dose reduction was achieved through use of a standardized protocol tailored for this examination and automatic exposure control for dose modulation. FINDINGS: THYROID: No nodule. MEDIASTINUM: No mass or lymphadenopathy. NICCI: No mass or lymphadenopathy. THORACIC AORTA: No dissection or aneurysm. PULMONARY ARTERIES: Normal in caliber. The pulmonary arteries are well enhanced. Respiratory motion is seen in the inferior left lower lobe. No evidence of pulmonary metastases. . TRACHEA/BRONCHI: Patent. ESOPHAGUS: No wall thickening or dilatation. HEART: Normal in size. PLEURA: No effusion or pneumothorax. LUNGS: No nodule, mass, or airspace disease. INCIDENTALLY IMAGED UPPER ABDOMEN: No focal abnormality. BONES: No destructive bone lesion. There is a small bone island in the right lateral seventh rib. IMPRESSION: 1. Respiratory motion artifact in the inferior left lower lobe. No evidence of pulmonary embolus. 2. No evidence of acute process. Marbella Weinstein

## 2018-11-14 NOTE — PROCEDURES
[facility]  *** FINAL REPORT ***    Name: Michelet Lopez  MRN: [id number]  : 22 Dec 1960  HIS Order #: [id number]  [id number] [facility] Visit #: [id number]  Date: 2018    TYPE OF TEST: Peripheral Venous Testing    REASON FOR TEST  Pain in limb, -h/o of left popliteal and posterior tibial vein DVT as  indicated in previous exam performed on 2018. Left Leg:-  Deep venous thrombosis:           Yes  Proximal extent of thrombus:      Popliteal At The Knee  Superficial venous thrombosis:    No  Deep venous insufficiency:        Not examined  Superficial venous insufficiency: Not examined      INTERPRETATION/FINDINGS  PROCEDURE:  Color duplex ultrasound imaging of lower extremity veins. FINDINGS:       Right: The common femoral vein is patent and without evidence of  thrombus. Phasic flow is observed. This extremity was not otherwise  evaluated. Left:   Consistent with thrombosis involving the distal popliteal   vein as demonstrated by vein partial-compressibility, and by a  narrowing of the flow channel on color Doppler imaging. The common  femoral, deep femoral, femoral, posterior tibial, peroneal, and  saphenofemoral junction are patent and without evidence of thrombus;  each is fully compressible and there is no narrowing of the flow  channel on color Doppler imaging. Phasic flow is observed in the  common femoral vein. IMPRESSION:  Evidence of  deep vein thrombosis in the distal popliteal   vein, as described above. In comparison to the previous exam on  2018  there is no evidence of new thrombosis on today's exam.    ADDITIONAL COMMENTS  Verbal give to YAMEL Day on 2018 at 1410. I have personally reviewed the data relevant to the interpretation of  this  study.     TECHNOLOGIST: Palak Bates  Signed: 2018 02:21 PM    PHYSICIAN: Haley Avina MD  Signed: 2018 03:35 PM

## 2018-11-14 NOTE — TELEPHONE ENCOUNTER
Pt came in to the clinic and stating that his left foot is bugging him since his visit on Monday, pt unsure if he needs to go to urgent care or to be seen by NP again.  Please call back

## 2018-11-14 NOTE — TELEPHONE ENCOUNTER
Spoke with patient. Informed him of Sabi's response via Mychart \"With your history of blood clots in your legs, I think your new leg pain needs to be evaluated emergently.  I would recommend that you go to the ER to have a Doppler done. The 2 labs that we reordered were not collected when you first had your labs drawn for some reason, we need to check those to evaluate for any possible causes of the nerve damage seen on your EMG. \"    Patient was given an opportunity to ask questions, repeated information, and verbalized understanding.

## 2018-11-14 NOTE — ED TRIAGE NOTES
Patient reports he is having L foot pain to the back of his heel. Patient reports this pain started Monday.   Patient has a hx of DVTs and PE

## 2018-11-15 LAB
ENA SS-A AB SER-ACNC: <0.2 AI (ref 0–0.9)
ENA SS-B AB SER-ACNC: <0.2 AI (ref 0–0.9)

## 2018-11-19 LAB — HU1 AB TITR SER: NORMAL TITER

## 2019-01-18 ENCOUNTER — OFFICE VISIT (OUTPATIENT)
Dept: NEUROLOGY | Age: 59
End: 2019-01-18

## 2019-01-18 VITALS
RESPIRATION RATE: 16 BRPM | WEIGHT: 207.2 LBS | HEART RATE: 64 BPM | OXYGEN SATURATION: 99 % | SYSTOLIC BLOOD PRESSURE: 150 MMHG | HEIGHT: 73 IN | BODY MASS INDEX: 27.46 KG/M2 | DIASTOLIC BLOOD PRESSURE: 82 MMHG

## 2019-01-18 DIAGNOSIS — G60.8 IDIOPATHIC SMALL AND LARGE FIBER SENSORY NEUROPATHY: Primary | ICD-10-CM

## 2019-01-18 DIAGNOSIS — R73.03 PREDIABETES: ICD-10-CM

## 2019-01-18 NOTE — PROGRESS NOTES
Mr. Merlinda Leyland presents today to follow up neuropathy. His symptoms are stable. Depression screening done on patient.

## 2019-01-18 NOTE — PROGRESS NOTES
Neurology Consult Note HISTORY PROVIDED BY: patient and wife Chief Complaint:  
Chief Complaint Patient presents with  Neurologic Problem  Neuropathy Subjective:  
Pt is a 59 y.o. right handed male initially and last seen in clinic on 10/11/18 with onset of paresthesias while hospitalized for DVT/PE in May, 2018, described as a crawling type feeling under his skin and numbness R>>L hands, and in the last couple of weeks has had diffuse pruritis, becoming painful with rubbing. Exam with dysconjugate eye movements with end gaze nystagmus bilaterally, +intermittent brief diplopia, chronic, o/w was non-focal and unremarkable.  
-No h/o cancer, no systemic illness such as liver or kidney disease, no known h/o autoimmune/CTD though his brother has a h/o sarcoidosis -Given timing with start of Eliquis, questioned if this is the cause of his pruritis  
-Cervical spine disease is rare cause of pruritis, but had low suspicion.  
-Recommended NCS/EMG to assess for evidence of mononeuropathy vs radiculopathy to explain hand numbness.  
-Consider skin biopsy for small fiber neuropathy to explain other paresthesias 
-If work up neg, recommend dermatology evaluation. He returns for f/u. NCS/EMG on 10/18/18 with Dr. Carey Melo revealed findings suggest a moderate to severe, distal, symmetric, axonal type of sensory greater than motor polyneuropathy. He had an elevated HgbA1C of 6.0 on 10/29/18. SSA/SSB and anti-Hu Ab were negative. He reports continued tingling, not any worse. Past Medical History:  
Diagnosis Date  BPH (benign prostatic hyperplasia)  Gout  Hypertension  Thromboembolus ([facility] [address] 2009 & 2012 Past Surgical History:  
Procedure Laterality Date  HX COLONOSCOPY  2012 Social History Socioeconomic History  Marital status:  Spouse name: Not on file  Number of children: Not on file  Years of education: Not on file  Highest education level: Not on file Social Needs  Financial resource strain: Not on file  Food insecurity - worry: Not on file  Food insecurity - inability: Not on file  Transportation needs - medical: Not on file  Transportation needs - non-medical: Not on file Occupational History  Occupation: Computer safety at [employer] Tobacco Use  Smoking status: Never Smoker  Smokeless tobacco: Never Used Substance and Sexual Activity  Alcohol use: No  
 Drug use: No  
 Sexual activity: Yes  
  Partners: Female Comment:  [employer] Other Topics Concern  Not on file Social History Narrative Lives in [place] with wife Family History Problem Relation Age of Onset  Cancer Mother   
     multiple myeloma  Diabetes Mother  Hypertension Mother  Stroke Mother  Cancer Father   
     pancreatic  Heart Disease Father  Hypertension Father  No Known Problems Brother  Hypertension Brother Resolved  Other Brother Sarcoid  No Known Problems Son  No Known Problems Daughter Objective:  
ROS: Per HPI or PMH o/w neg Allergies Allergen Reactions  Levaquin [Levofloxacin] Rash Meds: Outpatient Medications Prior to Visit Medication Sig Dispense Refill  chlorthalidone (HYGROTEN) 25 mg tablet Take 1 Tab by mouth daily. 90 Tab 3  
 apixaban (ELIQUIS) 5 mg tablet TAKE 1 TALBET BY MOUTH 2 TIMES A  Tab 3  Cholecalciferol, Vitamin D3, 1,000 unit cap Take 1,000 Units by mouth daily.  cyclobenzaprine (FLEXERIL) 10 mg tablet Take 1 Tab by mouth nightly. 15 Tab 0 No facility-administered medications prior to visit. Imaging: MRI Results (most recent): No results found for this or any previous visit. CT Results (most recent): 
Results from Hospital Encounter encounter on 11/14/18 CTA CHEST W OR W WO CONT Narrative EXAM:  CTA CHEST W OR W WO CONT INDICATION:   hx of dvt; shortness of breath COMPARISON: 5/22/2018. CONTRAST:  100 mL of Isovue-370. TECHNIQUE:  
Precontrast  images were obtained to localize the volume for acquisition. Multislice helical CT arteriography was performed from the diaphragm to the 
thoracic inlet during uneventful rapid bolus intravenous contrast 
administration. Lung and soft tissue windows were generated. Coronal and 
sagittal images were generated and 3D post processing consisting of coronal 
maximum intensity images was performed. CT dose reduction was achieved through 
use of a standardized protocol tailored for this examination and automatic 
exposure control for dose modulation. FINDINGS: 
 
THYROID: No nodule. MEDIASTINUM: No mass or lymphadenopathy. NICCI: No mass or lymphadenopathy. THORACIC AORTA: No dissection or aneurysm. PULMONARY ARTERIES: Normal in caliber. The pulmonary arteries are well enhanced. Respiratory motion is seen in the inferior left lower lobe. No evidence of 
pulmonary metastases. Keenan Hinojosa TRACHEA/BRONCHI: Patent. ESOPHAGUS: No wall thickening or dilatation. HEART: Normal in size. PLEURA: No effusion or pneumothorax. LUNGS: No nodule, mass, or airspace disease. INCIDENTALLY IMAGED UPPER ABDOMEN: No focal abnormality. BONES: No destructive bone lesion. There is a small bone island in the right 
lateral seventh rib. Impression IMPRESSION:  
1. Respiratory motion artifact in the inferior left lower lobe. No evidence of 
pulmonary embolus. 2. No evidence of acute process. Keenan Hinojosa Reviewed records in [facility] tab today Lab Review Results for orders placed or performed during the hospital encounter of 11/14/18 TROPONIN I Result Value Ref Range Troponin-I, Qt. <0.05 <0.05 ng/mL SAMPLES BEING HELD Result Value Ref Range SAMPLES BEING HELD 1RED,1LAV   
 COMMENT   Add-on orders for these samples will be processed based on acceptable specimen integrity and analyte stability, which may vary by analyte. PROTHROMBIN TIME + INR Result Value Ref Range INR 1.1 0.9 - 1.1 Prothrombin time 11.0 9.0 - 11.1 sec PTT Result Value Ref Range aPTT 26.8 22.1 - 32.0 sec  
 aPTT, therapeutic range     58.0 - 77.0 SECS  
POC CHEM8 Result Value Ref Range Calcium, ionized (POC) 1.24 1.12 - 1.32 mmol/L Sodium (POC) 141 136 - 145 mmol/L Potassium (POC) 3.2 (L) 3.5 - 5.1 mmol/L Chloride (POC) 95 (L) 98 - 107 mmol/L  
 CO2 (POC) 32 21 - 32 mmol/L Anion gap (POC) 18 10 - 20 mmol/L Glucose (POC) 97 65 - 100 mg/dL BUN (POC) 13 9 - 20 mg/dL Creatinine (POC) 1.1 0.6 - 1.3 mg/dL GFRAA, POC >60 >60 ml/min/1.73m2 GFRNA, POC >60 >60 ml/min/1.73m2 Hematocrit (POC) 49 36.6 - 50.3 % Comment Notified RN or MD immediately by  EKG, 12 LEAD, INITIAL Result Value Ref Range Ventricular Rate 71 BPM  
 Atrial Rate 71 BPM  
 P-R Interval 172 ms QRS Duration 114 ms Q-T Interval 378 ms QTC Calculation (Bezet) 410 ms Calculated P Axis 70 degrees Calculated R Axis -57 degrees Calculated T Axis 66 degrees Diagnosis Normal sinus rhythm Left anterior fascicular block Nonspecific ST and T wave abnormality When compared with ECG of 23-MAY-2018 04:10, 
premature ventricular complexes are no longer present Confirmed by Olvin Hernandez MD, Lorene Desai ([id number]) on 11/14/2018 4:15:51 PM 
  
  
 
Exam: 
Visit Vitals /82 Pulse 64 Resp 16 Ht 6' 1\" (1.854 m) Wt 94 kg (207 lb 3.2 oz) SpO2 99% BMI 27.34 kg/m² General:  Alert, cooperative, no distress. Head:  Normocephalic, without obvious abnormality, atraumatic. Respiratory: 
Heart:   Non labored breathing Regular rate and rhythm, no murmurs Neck:     
Extremities: Warm, no cyanosis or edema. Pulses: 2+ radial pulses. Neurologic:  MS: Alert and oriented x 4, speech intact. Language intact. Attention and fund of knowledge appropriate. Recent and remote memory intact. Cranial Nerves: 
II: visual fields II: pupils II: optic disc   
III,VII: ptosis none III,IV,VI: extraocular muscles  EOM dysconjugate with end gaze nystagmus bilaterally, +intermittent brief diplopia V: facial light touch sensation VII: facial muscle function   symmetric VIII: hearing intact IX: soft palate elevation XI: trapezius strength XI: sternocleidomastoid strength XII: tongue Motor: normal bulk and tone, no tremor Strength: 5/5 throughout, no PD Sensory: (At 10/11/18 OV: intact to LT, PP) Coordination: FTN intact Gait: normal gait Reflexes: 2+ symmetric Assessment/Plan Pt is a 59 y.o. right handed male with onset of paresthesias while hospitalized for DVT/PE in May, 2018, described as a crawling type feeling under his skin and numbness R>>L hands, and in the last couple of weeks has had diffuse pruritis, becoming painful with rubbing. NCS/EMG on 10/18/18 with Dr. 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 HgbA1C of 6.0 on 10/29/18. Exam with dysconjugate eye movements with end gaze nystagmus bilaterally, +intermittent brief diplopia, chronic, o/w was non-focal and unremarkable. Still question, given timing of pruritis with start of Eliquis, that this is the cause of his pruritis. Discussed diagnosis of PN, treatment of underlying cause to prevent progression, and most likely etiology being abnormal glucose metabolism. Diabetic PN would not be expected to progressive rapidly. Recommend f/u in 1 year, instructed to call in the interim if needed or if notices significant change in sxs. ICD-10-CM ICD-9-CM 1. Idiopathic small and large fiber sensory neuropathy G60.8 356.4 2. Prediabetes R73.03 790.29 Signed: Jose Luis Valderrama MD 
1/18/2019

## 2019-01-18 NOTE — PATIENT INSTRUCTIONS
A Healthy Lifestyle: Care Instructions Your Care Instructions A healthy lifestyle can help you feel good, stay at a healthy weight, and have plenty of energy for both work and play. A healthy lifestyle is something you can share with your whole family. A healthy lifestyle also can lower your risk for serious health problems, such as high blood pressure, heart disease, and diabetes. You can follow a few steps listed below to improve your health and the health of your family. Follow-up care is a key part of your treatment and safety. Be sure to make and go to all appointments, and call your doctor if you are having problems. It's also a good idea to know your test results and keep a list of the medicines you take. How can you care for yourself at home? · Do not eat too much sugar, fat, or fast foods. You can still have dessert and treats now and then. The goal is moderation. · Start small to improve your eating habits. Pay attention to portion sizes, drink less juice and soda pop, and eat more fruits and vegetables. ? Eat a healthy amount of food. A 3-ounce serving of meat, for example, is about the size of a deck of cards. Fill the rest of your plate with vegetables and whole grains. ? Limit the amount of soda and sports drinks you have every day. Drink more water when you are thirsty. ? Eat at least 5 servings of fruits and vegetables every day. It may seem like a lot, but it is not hard to reach this goal. A serving or helping is 1 piece of fruit, 1 cup of vegetables, or 2 cups of leafy, raw vegetables. Have an apple or some carrot sticks as an afternoon snack instead of a candy bar. Try to have fruits and/or vegetables at every meal. 
· Make exercise part of your daily routine. You may want to start with simple activities, such as walking, bicycling, or slow swimming. Try to be active 30 to 60 minutes every day.  You do not need to do all 30 to 60 minutes all at once. For example, you can exercise 3 times a day for 10 or 20 minutes. Moderate exercise is safe for most people, but it is always a good idea to talk to your doctor before starting an exercise program. 
· Keep moving. Inell Marissa the lawn, work in the garden, or [employer]. Take the stairs instead of the elevator at work. · If you smoke, quit. People who smoke have an increased risk for heart attack, stroke, cancer, and other lung illnesses. Quitting is hard, but there are ways to boost your chance of quitting tobacco for good. ? Use nicotine gum, patches, or lozenges. ? Ask your doctor about stop-smoking programs and medicines. ? Keep trying. In addition to reducing your risk of diseases in the future, you will notice some benefits soon after you stop using tobacco. If you have shortness of breath or asthma symptoms, they will likely get better within a few weeks after you quit. · Limit how much alcohol you drink. Moderate amounts of alcohol (up to 2 drinks a day for men, 1 drink a day for women) are okay. But drinking too much can lead to liver problems, high blood pressure, and other health problems. Family health If you have a family, there are many things you can do together to improve your health. · Eat meals together as a family as often as possible. · Eat healthy foods. This includes fruits, vegetables, lean meats and dairy, and whole grains. · Include your family in your fitness plan. Most people think of activities such as jogging or tennis as the way to fitness, but there are many ways you and your family can be more active. Anything that makes you breathe hard and gets your heart pumping is exercise. Here are some tips: 
? Walk to do errands or to take your child to school or the bus. 
? Go for a family bike ride after dinner instead of watching TV. Where can you learn more? Go to http://chai-anahy.info/. Enter X046 in the search box to learn more about \"A Healthy Lifestyle: Care Instructions. \" Current as of: December 7, 2017 Content Version: 11.8 © 2150-7883 Healthwise, [employer]. Care instructions adapted under license by [employer] (which disclaims liability or warranty for this information). If you have questions about a medical condition or this instruction, always ask your healthcare professional. [place] [address] any warranty or liability for your use of this information.

## 2019-01-22 ENCOUNTER — PATIENT MESSAGE (OUTPATIENT)
Dept: ONCOLOGY | Age: 59
End: 2019-01-22

## 2019-01-22 ENCOUNTER — PATIENT MESSAGE (OUTPATIENT)
Dept: INTERNAL MEDICINE CLINIC | Age: 59
End: 2019-01-22

## 2019-01-22 DIAGNOSIS — I82.433 ACUTE DEEP VEIN THROMBOSIS (DVT) OF POPLITEAL VEIN OF BOTH LOWER EXTREMITIES (HCC): ICD-10-CM

## 2019-01-22 DIAGNOSIS — Z86.718 HISTORY OF DEEP VEIN THROMBOSIS (DVT) OF LOWER EXTREMITY: ICD-10-CM

## 2019-01-22 DIAGNOSIS — I26.99 BILATERAL PULMONARY EMBOLISM (HCC): ICD-10-CM

## 2019-01-22 DIAGNOSIS — I82.4Z3 ACUTE DEEP VEIN THROMBOSIS (DVT) OF DISTAL VEIN OF BOTH LOWER EXTREMITIES (HCC): Primary | ICD-10-CM

## 2019-01-22 DIAGNOSIS — Z86.711 HISTORY OF PULMONARY EMBOLUS (PE): ICD-10-CM

## 2019-01-22 DIAGNOSIS — I74.9 THROMBOEMBOLUS (HCC): Primary | ICD-10-CM

## 2019-01-23 NOTE — TELEPHONE ENCOUNTER
From: Cheryl Seat  To: Bryant Hughes  Sent: 1/22/2019 12:17 PM EST  Subject: Non-Urgent Medical Question    I have not taken Xarelto before, and from what I've read, it can possibly have the same effects I am currently having. What other options might I have? Milo Bajwa,   We shouldn't decrease the Eliquis dose, but we could try one of it's sister drugs. Do you feel comfortable with Xarelto?    Anyi Renee

## 2019-01-27 PROBLEM — I82.403 DVT, BILATERAL LOWER LIMBS (HCC): Status: RESOLVED | Noted: 2018-05-22 | Resolved: 2019-01-27

## 2019-01-27 PROBLEM — I26.99 BILATERAL PULMONARY EMBOLISM (HCC): Status: RESOLVED | Noted: 2018-05-22 | Resolved: 2019-01-27

## 2019-01-27 PROBLEM — Z86.718 HISTORY OF DVT (DEEP VEIN THROMBOSIS): Status: ACTIVE | Noted: 2019-01-27

## 2019-01-27 PROBLEM — Z86.711 HISTORY OF PULMONARY EMBOLUS (PE): Status: ACTIVE | Noted: 2019-01-27

## 2019-01-28 NOTE — TELEPHONE ENCOUNTER
From: Oscar Prado  To: Nima Justin DO  Sent: 1/22/2019 9:16 PM EST  Subject: Non-Urgent Medical Question    Please refer to the following information. Should I set up an appointment with you? Maynor Lin PA-C  01/22/2019 12:47 PMPrintDelete  RE: Non-Urgent Medical Question    Pradaxa or Coumadin are your only other options as far as I know. If you would like to do a Hematology consult, I'd be happy to refer you. From: Oscar Prado  Sent: 1/22/2019 12:17 PM EST  To: Maynor Lin PA-C  Subject: Non-Urgent Medical Question    I have not taken Xarelto before, and from what I've read, it can possibly have the same effects I am currently having. What other options might I have? Milo Bajwa,   We shouldn't decrease the Eliquis dose, but we could try one of it's sister drugs. Do you feel comfortable with Xarelto?    Elias List

## 2019-02-08 RX ORDER — WARFARIN SODIUM 5 MG/1
5 TABLET ORAL DAILY
Qty: 30 TAB | Refills: 5 | Status: SHIPPED | OUTPATIENT
Start: 2019-02-08 | End: 2019-02-22 | Stop reason: DRUGHIGH

## 2019-02-22 ENCOUNTER — OFFICE VISIT (OUTPATIENT)
Dept: INTERNAL MEDICINE CLINIC | Age: 59
End: 2019-02-22

## 2019-02-22 VITALS
DIASTOLIC BLOOD PRESSURE: 85 MMHG | OXYGEN SATURATION: 98 % | HEART RATE: 62 BPM | TEMPERATURE: 98.7 F | RESPIRATION RATE: 20 BRPM | BODY MASS INDEX: 27.3 KG/M2 | HEIGHT: 73 IN | WEIGHT: 206 LBS | SYSTOLIC BLOOD PRESSURE: 149 MMHG

## 2019-02-22 DIAGNOSIS — Z86.718 HISTORY OF DVT (DEEP VEIN THROMBOSIS): Primary | ICD-10-CM

## 2019-02-22 DIAGNOSIS — Z86.711 HISTORY OF PULMONARY EMBOLUS (PE): ICD-10-CM

## 2019-02-22 DIAGNOSIS — I10 HYPERTENSION, UNCONTROLLED: ICD-10-CM

## 2019-02-22 LAB
INR BLD: 1.4
PT POC: NORMAL SECONDS
VALID INTERNAL CONTROL?: YES

## 2019-02-22 RX ORDER — LISINOPRIL AND HYDROCHLOROTHIAZIDE 20; 25 MG/1; MG/1
1 TABLET ORAL DAILY
Qty: 30 TAB | Refills: 5 | Status: SHIPPED | OUTPATIENT
Start: 2019-02-22 | End: 2019-03-19 | Stop reason: SDUPTHER

## 2019-02-22 RX ORDER — WARFARIN 6 MG/1
6 TABLET ORAL DAILY
Qty: 30 TAB | Refills: 5 | Status: SHIPPED | OUTPATIENT
Start: 2019-02-22 | End: 2019-03-03 | Stop reason: DRUGHIGH

## 2019-02-22 NOTE — PROGRESS NOTES
Cisco Barnes is a 59 y.o. male  Chief Complaint   Patient presents with    Hypertension    Coagulation disorder     1.4        Health Maintenance Due   Topic Date Due    DTaP/Tdap/Td series (1 - Tdap) 12/22/1979    Shingrix Vaccine Age 50> (1 of 2) 12/22/2008       HPI  Hx DVT/PE. Had neuropathy sx on Eliquis, and didn't want to try Xarelto, so we elected to start Coumadin 5 mg. Pt started this on 2/12/19. No change in neuropathy symptoms yet. INR today is 1.4. No missed doses of Coumadin. HTN - remains uncontrolled. BP Readings from Last 3 Encounters:   02/22/19 149/85   01/18/19 150/82   11/14/18 (!) 179/107     BP at home: high 120s/70s-140s/90s. Taking Clorthalidone 25 mg daily. Review of Systems   Respiratory: Negative for shortness of breath. Cardiovascular: Negative for chest pain and palpitations. Neurological: Negative for dizziness, tingling, sensory change, loss of consciousness and weakness. Physical Exam   Constitutional: He is oriented to person, place, and time. He appears well-developed and well-nourished. No distress. HENT:   Head: Normocephalic and atraumatic. Neck: Neck supple. No JVD present. No bruit bilateral carotid arteries. Cardiovascular: Normal rate, regular rhythm and normal heart sounds. Pulmonary/Chest: Effort normal and breath sounds normal. No respiratory distress. Musculoskeletal: He exhibits no edema. Neurological: He is alert and oriented to person, place, and time. Skin: Skin is warm and dry. Psychiatric: He has a normal mood and affect. His behavior is normal. Judgment and thought content normal.   Nursing note and vitals reviewed. Diagnoses and all orders for this visit:    1. History of DVT (deep vein thrombosis)  -     AMB POC PT/INR  -     warfarin (COUMADIN) 6 mg tablet; Take 1 Tab by mouth daily. 2. History of pulmonary embolus (PE)  -     AMB POC PT/INR  -     warfarin (COUMADIN) 6 mg tablet;  Take 1 Tab by mouth daily.    3. Hypertension, uncontrolled  -  Stop Chlorthalidone. Start lisinopril-hydroCHLOROthiazide (PRINZIDE, ZESTORETIC) 20-25 mg per tablet; Take 1 Tab by mouth daily.

## 2019-03-01 ENCOUNTER — OFFICE VISIT (OUTPATIENT)
Dept: INTERNAL MEDICINE CLINIC | Age: 59
End: 2019-03-01

## 2019-03-01 VITALS
SYSTOLIC BLOOD PRESSURE: 153 MMHG | WEIGHT: 204.8 LBS | OXYGEN SATURATION: 98 % | TEMPERATURE: 99 F | HEART RATE: 71 BPM | DIASTOLIC BLOOD PRESSURE: 83 MMHG | RESPIRATION RATE: 18 BRPM | HEIGHT: 73 IN | BODY MASS INDEX: 27.14 KG/M2

## 2019-03-01 DIAGNOSIS — Z86.711 HISTORY OF PULMONARY EMBOLUS (PE): ICD-10-CM

## 2019-03-01 DIAGNOSIS — I10 ESSENTIAL HYPERTENSION: ICD-10-CM

## 2019-03-01 DIAGNOSIS — Z86.718 HISTORY OF DVT (DEEP VEIN THROMBOSIS): Primary | ICD-10-CM

## 2019-03-01 NOTE — PROGRESS NOTES
1. Have you been to the ER, urgent care clinic since your last visit? Hospitalized since your last visit? No 
 
2. Have you seen or consulted any other health care providers outside of the [address] [place] [address] since your last visit? Include any pap smears or colon screening. No  
Chief Complaint Patient presents with  Hypertension  
  follow up Not fasting Abuse Screening Questionnaire 3/1/2019 Do you ever feel afraid of your partner? Donna Aparicio Are you in a relationship with someone who physically or mentally threatens you? Donna Aparicio Is it safe for you to go home?  Cayetano Primrose

## 2019-03-01 NOTE — PROGRESS NOTES
Johnson Mloina is a 24[address] [facility] male Chief Complaint Patient presents with  Hypertension  
  follow up Health Maintenance Due Topic Date Due  
 DTaP/Tdap/Td series (1 - Tdap) 12/22/1979  Shingrix Vaccine Age 50> (1 of 2) 12/22/2008 HPI Hx DVT & PE - needs INR rechecked, but we are out of test strips in the office today. Currently taking Coumadin 6 mg daily. HTN - remains uncontrolled today. Attempted to Changed Chlorthalidone to Lisinopril-HCTZ 20-25 at last visit 2/22. Pt didn't start Lisinopril-HCTZ because was worried about s/e of potentially feeling dizzy. He felt dizzy on this medication once in the remote past.  
 
BP Readings from Last 9 Encounters:  
03/01/19 153/83  
02/22/19 149/85  
01/18/19 150/82  
11/14/18 (!) 179/107  
11/12/18 128/70  
10/18/18 (!) 163/99  
10/11/18 164/88  
08/29/18 (!) 157/92  
08/09/18 136/85 Review of Systems Respiratory: Negative for shortness of breath. Cardiovascular: Negative for chest pain and palpitations. Neurological: Negative for dizziness, loss of consciousness and weakness. Physical Exam  
Constitutional: He is oriented to person, place, and time. He appears well-developed and well-nourished. No distress. HENT:  
Head: Normocephalic and atraumatic. Neck: Neck supple. No JVD present. No bruit bilateral carotid arteries. Cardiovascular: Normal rate, regular rhythm and normal heart sounds. Pulmonary/Chest: Effort normal and breath sounds normal. No respiratory distress. Musculoskeletal: He exhibits no edema. Neurological: He is alert and oriented to person, place, and time. Skin: Skin is warm and dry. Psychiatric: He has a normal mood and affect. His behavior is normal. Judgment and thought content normal.  
Nursing note and vitals reviewed. Diagnoses and all orders for this visit: 
 
1. History of DVT (deep vein thrombosis) 
-     PROTHROMBIN TIME + INR 
 
2.  History of pulmonary embolus (PE) 
 -     PROTHROMBIN TIME + INR 
 
3. Essential hypertension Pt elects to check BP frequently at home and when out as well and then bring a log of BPs to review next time. Detailed risks of inadequately treated HTN including stroke/MI. Pt notes understanding.

## 2019-03-02 LAB
INR PPP: 1.4 (ref 0.8–1.2)
PROTHROMBIN TIME: 14.4 SEC (ref 9.1–12)

## 2019-03-03 RX ORDER — WARFARIN 7.5 MG/1
7.5 TABLET ORAL DAILY
Qty: 30 TAB | Refills: 1 | Status: SHIPPED | OUTPATIENT
Start: 2019-03-03 | End: 2019-03-11 | Stop reason: ALTCHOICE

## 2019-03-03 NOTE — PROGRESS NOTES
Kelly Cedeno, Your INR hasn't improved despite the increase to 6 mg daily last week. You were taking 6 mg daily last week, right? If so, let's try 7.5 mg per day this time and recheck in 1 week. I'll send a new script now.   
Sofía Chang

## 2019-03-11 ENCOUNTER — OFFICE VISIT (OUTPATIENT)
Dept: INTERNAL MEDICINE CLINIC | Age: 59
End: 2019-03-11

## 2019-03-11 ENCOUNTER — PATIENT MESSAGE (OUTPATIENT)
Dept: INTERNAL MEDICINE CLINIC | Age: 59
End: 2019-03-11

## 2019-03-11 VITALS
TEMPERATURE: 98.4 F | HEART RATE: 62 BPM | WEIGHT: 208.6 LBS | HEIGHT: 73 IN | RESPIRATION RATE: 18 BRPM | SYSTOLIC BLOOD PRESSURE: 154 MMHG | OXYGEN SATURATION: 99 % | DIASTOLIC BLOOD PRESSURE: 89 MMHG | BODY MASS INDEX: 27.65 KG/M2

## 2019-03-11 DIAGNOSIS — Z86.718 HISTORY OF DVT (DEEP VEIN THROMBOSIS): Primary | ICD-10-CM

## 2019-03-11 DIAGNOSIS — Z86.711 HISTORY OF PULMONARY EMBOLUS (PE): ICD-10-CM

## 2019-03-11 DIAGNOSIS — I10 ESSENTIAL HYPERTENSION: ICD-10-CM

## 2019-03-11 DIAGNOSIS — G60.8 IDIOPATHIC SMALL AND LARGE FIBER SENSORY NEUROPATHY: ICD-10-CM

## 2019-03-11 LAB
INR BLD: 2
PT POC: 24.6 SECONDS
VALID INTERNAL CONTROL?: YES

## 2019-03-11 NOTE — PROGRESS NOTES
1. Have you been to the ER, urgent care clinic since your last visit? Hospitalized since your last visit? No    2. Have you seen or consulted any other health care providers outside of the [address] [place] [address] since your last visit? Include any pap smears or colon screening.  No   Chief Complaint   Patient presents with    Anticoagulation     follow up     Not fasting

## 2019-03-11 NOTE — PROGRESS NOTES
Linard Favre is a 59 y.o. male  Chief Complaint   Patient presents with    Anticoagulation     follow up        Health Maintenance Due   Topic Date Due    DTaP/Tdap/Td series (1 - Tdap) 12/22/1979    Shingrix Vaccine Age 50> (1 of 2) 12/22/2008       HPI  Hx DVT & PE - Previously took Eliquis, but developed neuropathy symptoms. Saw neuro and there was a question of whether the Eliquis was the cause for neuropathy symptoms. This is week #5 of Coumadin tx and pt has noticed no change in neuropathy. Here today for coumadin monitoring. Increased Coumadin dose from 6 mg to 7.5 mg daily 1.5 weeks ago. INR is 2.0 today. Lab Results   Component Value Date/Time    INR 1.4 (H) 03/01/2019 10:29 AM    INR 1.1 11/14/2018 12:30 PM    INR POC 2.0 03/11/2019 02:38 PM    INR POC 1.4 02/22/2019 08:00 AM    Prothrombin time 14.4 (H) 03/01/2019 10:29 AM    Prothrombin time 11.0 11/14/2018 12:30 PM     HTN -   BP uncontrolled   BP Readings from Last 3 Encounters:   03/11/19 154/89   03/01/19 153/83   02/22/19 149/85     Pt has been checking BP at home and brings in a long list: Usually below 140/90. Rare readings in 140s/90s-150s/90s. Has not yet checked BP outside of the home, but plans to do so. Has not yet started Lisinopril HCTZ. Currently taking Chlorthalidone. Now willing to start Lisinopril HCTZ. Undecided if he's willing to take whole or half tab to start. Key CAD CHF Meds             warfarin (COUMADIN) 7.5 mg tablet Take 1 Tab by mouth daily. lisinopril-hydroCHLOROthiazide (PRINZIDE, ZESTORETIC) 20-25 mg per tablet Take 1 Tab by mouth daily. Review of Systems   Respiratory: Negative for shortness of breath. Cardiovascular: Negative for chest pain and palpitations. Gastrointestinal: Negative for blood in stool. Genitourinary: Negative for hematuria. Neurological: Negative for dizziness, loss of consciousness and weakness. Endo/Heme/Allergies: Does not bruise/bleed easily.        Physical Exam   Constitutional: He is oriented to person, place, and time. He appears well-developed and well-nourished. No distress. HENT:   Head: Normocephalic and atraumatic. Neck: Neck supple. No JVD present. No bruit bilateral carotid arteries. Cardiovascular: Normal rate, regular rhythm and normal heart sounds. Pulmonary/Chest: Effort normal and breath sounds normal. No respiratory distress. Musculoskeletal: He exhibits no edema. Neurological: He is alert and oriented to person, place, and time. Skin: Skin is warm and dry. Psychiatric: He has a normal mood and affect. His behavior is normal. Judgment and thought content normal.   Nursing note and vitals reviewed. Diagnoses and all orders for this visit:    1. History of DVT (deep vein thrombosis)  -     AMB POC PT/INR    2. History of pulmonary embolus (PE)  -     AMB POC PT/INR  Controlled INR. Continue 7.5 mg daily and follow up in 2 weeks. 3. Essential hypertension  Encouraged pt to stop Chlorthalidone and start Lisinopril/HCTZ as planned. Follow up in 2 weeks. 4. Idiopathic small and large fiber sensory neuropathy - Will send Neuro a note on question of how long to take Coumadin before ruling out Eliquis as the cause of neuropathy symptoms.

## 2019-03-13 NOTE — TELEPHONE ENCOUNTER
From: Bib Barbour PA-C  To: Sarmad Yancey  Sent: 3/11/2019 4:41 PM EDT  Subject: Eliquis    Milo Mota Courser,   Your neurologist replied and said that our trial of Coumadin vs Eliquis has been sufficient and we don't need to continue the Coumadin because if the itching was caused by Eliquis, it would have stopped by now. You can go back to the Eliquis, and we don't need to keep checking INRs.   Todd Leyva

## 2019-03-21 DIAGNOSIS — I10 HYPERTENSION, UNCONTROLLED: ICD-10-CM

## 2019-03-22 ENCOUNTER — TELEPHONE (OUTPATIENT)
Dept: INTERNAL MEDICINE CLINIC | Age: 59
End: 2019-03-22

## 2019-03-22 RX ORDER — LISINOPRIL AND HYDROCHLOROTHIAZIDE 20; 25 MG/1; MG/1
1 TABLET ORAL DAILY
Qty: 90 TAB | Refills: 1 | Status: SHIPPED | OUTPATIENT
Start: 2019-03-22 | End: 2019-07-08 | Stop reason: SDUPTHER

## 2019-03-22 NOTE — TELEPHONE ENCOUNTER
Informed pharmacist per Marcos MONTESINOS note from 3/11/19 states \"encouraged pt to stop Chlorthalidone and start Lisinopril/HCTZ as planned. Follow up in 2 weeks. \"

## 2019-03-22 NOTE — TELEPHONE ENCOUNTER
Viviana Fall (pharmacist) with [facility] request for clarification on blood pressure medications. They have patient being on Chlorthalidone, but they received prescription for Lisinopril/HCTZ.

## 2019-04-05 ENCOUNTER — OFFICE VISIT (OUTPATIENT)
Dept: INTERNAL MEDICINE CLINIC | Age: 59
End: 2019-04-05

## 2019-04-05 VITALS
RESPIRATION RATE: 18 BRPM | DIASTOLIC BLOOD PRESSURE: 78 MMHG | SYSTOLIC BLOOD PRESSURE: 132 MMHG | BODY MASS INDEX: 27.3 KG/M2 | HEART RATE: 63 BPM | TEMPERATURE: 98.9 F | WEIGHT: 206 LBS | HEIGHT: 73 IN | OXYGEN SATURATION: 99 %

## 2019-04-05 DIAGNOSIS — I10 ESSENTIAL HYPERTENSION: Primary | ICD-10-CM

## 2019-04-05 NOTE — PROGRESS NOTES
1. Have you been to the ER, urgent care clinic since your last visit? Hospitalized since your last visit? No    2. Have you seen or consulted any other health care providers outside of the [address] [place] [address] since your last visit? Include any pap smears or colon screening.  No   Chief Complaint   Patient presents with    Hypertension     follow up     Not fasting

## 2019-04-05 NOTE — PROGRESS NOTES
Pushpa Lozano is a 59 y.o. male  Chief Complaint   Patient presents with    Hypertension     follow up        Health Maintenance Due   Topic Date Due    DTaP/Tdap/Td series (1 - Tdap) 12/22/1979    Shingrix Vaccine Age 50> (1 of 2) 12/22/2008       HPI  BP uncontrolled at first check:   BP Readings from Last 3 Encounters:   04/05/19 (!) 152/95   03/11/19 154/89   03/01/19 153/83     Controlled at recheck:   BP Readings from Last 1 Encounters:   04/05/19 132/78     Currently taking:   Key CAD CHF Meds             lisinopril-hydroCHLOROthiazide (PRINZIDE, ZESTORETIC) 20-25 mg per tablet Take 1 Tab by mouth daily. apixaban (ELIQUIS) 5 mg tablet TAKE 1 TALBET BY MOUTH 2 TIMES A DAY        BPs outside of the office 112-120s/70-80s! Rarely getting a reading 140s-150s when not at rest, but immediately going down to normal range. Review of Systems   Respiratory: Negative for shortness of breath. Cardiovascular: Negative for chest pain and palpitations. Neurological: Negative for dizziness, loss of consciousness and weakness. Physical Exam   Constitutional: He is oriented to person, place, and time. He appears well-developed and well-nourished. No distress. HENT:   Head: Normocephalic and atraumatic. Neck: Neck supple. No JVD present. No bruit bilateral carotid arteries. Cardiovascular: Normal rate, regular rhythm and normal heart sounds. Pulmonary/Chest: Effort normal and breath sounds normal. No respiratory distress. Musculoskeletal: He exhibits no edema. Neurological: He is alert and oriented to person, place, and time. Skin: Skin is warm and dry. Psychiatric: He has a normal mood and affect. His behavior is normal. Judgment and thought content normal.   Nursing note and vitals reviewed. Diagnoses and all orders for this visit:    1. Essential hypertension    Controlled! Continue current medications, continue monitoring at home, and follow up in 6 months.

## 2019-06-10 ENCOUNTER — PATIENT MESSAGE (OUTPATIENT)
Dept: INTERNAL MEDICINE CLINIC | Age: 59
End: 2019-06-10

## 2019-06-11 NOTE — TELEPHONE ENCOUNTER
From: Maribel Love  To: Nereyda Zaman PA-C  Sent: 6/10/2019 5:51 PM EDT  Subject: Prescription Question    Berna Lizzy,  I have experienced the following since 6/3/19. I started taking Lisinopril-HCTZ 20/25 recently. I wonder could it be the cause.     Rare Side Effect    Fast or Irregular heartbeat    Gege Robledo

## 2019-06-29 LAB
CREATININE, EXTERNAL: 1.13
HBA1C MFR BLD HPLC: 5.9 %
LDL-C, EXTERNAL: 124
MICROALBUMIN UR TEST STR-MCNC: <3 MG/DL
PSA, EXTERNAL: 1.2

## 2019-07-07 ENCOUNTER — PATIENT MESSAGE (OUTPATIENT)
Dept: INTERNAL MEDICINE CLINIC | Age: 59
End: 2019-07-07

## 2019-07-07 DIAGNOSIS — I10 HYPERTENSION, UNCONTROLLED: ICD-10-CM

## 2019-07-08 RX ORDER — LISINOPRIL AND HYDROCHLOROTHIAZIDE 20; 25 MG/1; MG/1
1 TABLET ORAL DAILY
Qty: 90 TAB | Refills: 1 | Status: SHIPPED | OUTPATIENT
Start: 2019-07-08 | End: 2020-02-15

## 2019-07-08 NOTE — TELEPHONE ENCOUNTER
From: Valentin Castelan  To: Wicho Mojica  Sent: 7/7/2019 4:16 PM EDT  Subject: Prescription Question    Berna Wallis,    I have contacted [facility] for prescription refills. They need your approval. They should be contacting you also. The prescription are for the following. Hopefully, I can get the BP med by Wednesday. Thank you.   LISINOPRIL-HCTZ TAB 20-25MG  ELIQUIS TAB 5MG    [facility] phone: [phone]

## 2019-08-02 ENCOUNTER — OFFICE VISIT (OUTPATIENT)
Dept: NEUROLOGY | Age: 59
End: 2019-08-02

## 2019-08-02 VITALS
RESPIRATION RATE: 18 BRPM | DIASTOLIC BLOOD PRESSURE: 84 MMHG | BODY MASS INDEX: 27.18 KG/M2 | OXYGEN SATURATION: 98 % | HEART RATE: 73 BPM | WEIGHT: 206 LBS | SYSTOLIC BLOOD PRESSURE: 154 MMHG

## 2019-08-02 DIAGNOSIS — R20.0 NUMBNESS AND TINGLING OF RIGHT THUMB: ICD-10-CM

## 2019-08-02 DIAGNOSIS — R20.2 NUMBNESS AND TINGLING OF RIGHT THUMB: ICD-10-CM

## 2019-08-02 DIAGNOSIS — G62.9 SENSORY NEUROPATHY: Primary | ICD-10-CM

## 2019-08-02 NOTE — PATIENT INSTRUCTIONS
PRESCRIPTION REFILL POLICY [facility] Statement to Patients April 1, 2014 In an effort to ensure the large volume of patient prescription refills is processed in the most efficient and expeditious manner, we are asking our patients to assist us by calling your Pharmacy for all prescription refills, this will include also your  Mail Order Pharmacy. The pharmacy will contact our office electronically to continue the refill process. Please do not wait until the last minute to call your pharmacy. We need at least 48 hours (2days) to fill prescriptions. We also encourage you to call your pharmacy before going to  your prescription to make sure it is ready. With regard to controlled substance prescription refill requests (narcotic refills) that need to be picked up at our office, we ask your cooperation by providing us with at least 72 hours (3days) notice that you will need a refill. We will not refill narcotic prescription refill requests after 4:00pm on any weekday, Monday through Thursday, or after 2:00pm on Fridays, or on the weekends. We encourage everyone to explore another way of getting your prescription refill request processed using [employer], our patient web portal through our electronic medical record system. [employer] is an efficient and effective way to communicate your medication request directly to the office and  downloadable as an irma on your smart phone . [employer] also features a review functionality that allows you to view your medication list as well as leave messages for your physician. Are you ready to get connected? If so please review the attatched instructions or speak to any of our staff to get you set up right away! Thank you so much for your cooperation. Should you have any questions please contact our Practice Administrator. The Physicians and Staff,  [facility]

## 2019-08-02 NOTE — PROGRESS NOTES
Neurology Consult Note      HISTORY PROVIDED BY: patient and wife    Chief Complaint:   Chief Complaint   Patient presents with    Neurologic Problem      Subjective:   Pt is a 59 y.o. right handed male last seen in clinic on 1/18/19 in f/u for paresthesias with onset while hospitalized for DVT/PE in May, 2018, described as a crawling type feeling under his skin and numbness R>>L hands, and in the previous couple of weeks had diffuse pruritis, becoming painful with rubbing. NCS/EMG on 10/18/18 with Dr. Tawanna Levy revealed findings suggest a moderate to severe, distal, symmetric, axonal type of sensory greater than motor polyneuropathy. HgbA1C of 6.0 on 10/29/18. Exam with dysconjugate eye movements with end gaze nystagmus bilaterally, +intermittent brief diplopia, chronic, o/w was non-focal and unremarkable. Still questioned, given timing of pruritis with start of Eliquis, that this is the cause of his pruritis. Discussed diagnosis of PN, treatment of underlying cause to prevent progression, and most likely etiology being abnormal glucose metabolism. Diabetic PN would not be expected to progressive rapidly. He returns for f/u. Pt reports sxs are stable since we last met in January. States his sxs are not better, but not worse. The pruritis is improved, but crawling sensation is still present. His right thumb is persistently numb, no change from previous. Notes from new PCP, Dr. Nate Dowlingr reviewed -labs 7/30/2019-heavy metal profile unremarkable, 6/28/19 hemoglobin A1c 5.9, UA unremarkable, CMP-unremarkable, CBC with differential -unremarkable except ANC 1.0  Office visit 7/8/2019-annual physical exam reviewed history of his sensory neuropathy and work-up. Mentions diagnosis of neutropenia mild and present as far back as 2013. Hypertension not at goal.  Diagnosis of prediabetes reviewed.     Previous lab evaluation for etiology of sensory neuropathy:  11/14/18 - Anti Hu Ab - neg, SSA/SSB - neg  10/29/18 - RAYMUNDO - neg, HgbA1C 6.0, RPR - NR, SPEP with IF - neg, B6 - 17.2  Other labs - 8/9/18 - HCV - neg, TSH 2.010, Folate >20, B12 599, CMP - wnl, CBC with diff - wnl. Past Medical History:   Diagnosis Date    [facility] 5/22/2018    BPH (benign prostatic hyperplasia)     DVT, bilateral lower limbs ([facility] 5/22/2018    Gout     HTN (hypertension)     Hypertension     Neutropenia ([facility]     Prediabetes     Thromboembolus ([facility]     2009 & 2012    Thromboembolus [facility])     2009 & 2012. 2014 elevated D dimer.        Past Surgical History:   Procedure Laterality Date    HX COLONOSCOPY  2012      Social History     Socioeconomic History    Marital status:      Spouse name: Not on file    Number of children: Not on file    Years of education: Not on file    Highest education level: Not on file   Occupational History    Occupation: Retired   Social Needs    Financial resource strain: Not on file    Food insecurity:     Worry: Not on file     Inability: Not on file   [employer] needs:     Medical: Not on file     Non-medical: Not on file   Tobacco Use    Smoking status: Never Smoker    Smokeless tobacco: Never Used   Substance and Sexual Activity    Alcohol use: No    Drug use: No    Sexual activity: Yes     Partners: Female     Comment:  [employer]   Lifestyle    Physical activity:     Days per week: Not on file     Minutes per session: Not on file    Stress: Not on file   Relationships    Social connections:     Talks on phone: Not on file     Gets together: Not on file     Attends [employer] service: Not on file     Active member of club or organization: Not on file     Attends meetings of clubs or organizations: Not on file     Relationship status: Not on file    Intimate partner violence:     Fear of current or ex partner: Not on file     Emotionally abused: Not on file     Physically abused: Not on file     Forced sexual activity: Not on file   Other Topics Concern    Not on file   Social History Narrative    Lives in [place] with wife     Family History   Problem Relation Age of Onset    Cancer Mother         multiple myeloma    Diabetes Mother     Hypertension Mother     Stroke Mother     Cancer Father         pancreatic    Heart Disease Father     Hypertension Father     No Known Problems Brother     Hypertension Brother         Resolved    Other Brother         Sarcoid    No Known Problems Son     No Known Problems Daughter          Objective:   ROS: Per HPI or PMH o/w neg    Allergies   Allergen Reactions    Levaquin [Levofloxacin] Rash        Meds:  Outpatient Medications Prior to Visit   Medication Sig Dispense Refill    lisinopril-hydroCHLOROthiazide (PRINZIDE, ZESTORETIC) 20-25 mg per tablet Take 1 Tab by mouth daily. 90 Tab 1    apixaban (ELIQUIS) 5 mg tablet TAKE 1 TALBET BY MOUTH 2 TIMES A  Tab 3    apixaban (ELIQUIS) 5 mg tablet TAKE 1 TALBET BY MOUTH 2  TIMES A  Tab 3    Cholecalciferol, Vitamin D3, 1,000 unit cap Take 1,000 Units by mouth daily. No facility-administered medications prior to visit. Imaging:  MRI Results (most recent):  No results found for this or any previous visit. CT Results (most recent):  Results from Hospital Encounter encounter on 11/14/18   CTA CHEST W OR W WO CONT    Narrative EXAM:  CTA CHEST W OR W WO CONT    INDICATION:   hx of dvt; shortness of breath    COMPARISON: 5/22/2018. CONTRAST:  100 mL of Isovue-370. TECHNIQUE:   Precontrast  images were obtained to localize the volume for acquisition. Multislice helical CT arteriography was performed from the diaphragm to the  thoracic inlet during uneventful rapid bolus intravenous contrast  administration. Lung and soft tissue windows were generated. Coronal and  sagittal images were generated and 3D post processing consisting of coronal  maximum intensity images was performed.   CT dose reduction was achieved through  use of a standardized protocol tailored for this examination and automatic  exposure control for dose modulation. FINDINGS:    THYROID: No nodule. MEDIASTINUM: No mass or lymphadenopathy. NICCI: No mass or lymphadenopathy. THORACIC AORTA: No dissection or aneurysm. PULMONARY ARTERIES: Normal in caliber. The pulmonary arteries are well enhanced. Respiratory motion is seen in the inferior left lower lobe. No evidence of  pulmonary metastases. .  TRACHEA/BRONCHI: Patent. ESOPHAGUS: No wall thickening or dilatation. HEART: Normal in size. PLEURA: No effusion or pneumothorax. LUNGS: No nodule, mass, or airspace disease. INCIDENTALLY IMAGED UPPER ABDOMEN: No focal abnormality. BONES: No destructive bone lesion. There is a small bone island in the right  lateral seventh rib. Impression IMPRESSION:   1. Respiratory motion artifact in the inferior left lower lobe. No evidence of  pulmonary embolus. 2. No evidence of acute process. .                Reviewed records in [employer] tab today    Lab Review   Results for orders placed or performed in visit on 03/11/19   AMB POC PT/INR   Result Value Ref Range    VALID INTERNAL CONTROL POC Yes     Prothrombin time (POC) 24.6 seconds    INR POC 2.0         Exam:  Visit Vitals  /84   Pulse 73   Resp 18   Wt 93.4 kg (206 lb)   SpO2 98%   BMI 27.18 kg/m²     General:  Alert, cooperative, no distress. Head:  Normocephalic, without obvious abnormality, atraumatic. Respiratory:  Heart:   Non labored breathing  Regular rate and rhythm, no murmurs   Neck:      Extremities: Warm, no cyanosis or edema. Pulses: 2+ radial pulses. Neurologic:  MS: Alert and oriented x 4, speech intact. Language intact. Attention and fund of knowledge appropriate. Recent and remote memory intact.   Cranial Nerves:  II: visual fields    II: pupils    II: optic disc    III,VII: ptosis none   III,IV,VI: extraocular muscles  EOM dysconjugate with end gaze nystagmus bilaterally   V: facial light touch sensation     VII: facial muscle function   symmetric   VIII: hearing intact   IX: soft palate elevation     XI: trapezius strength     XI: sternocleidomastoid strength    XII: tongue       Motor: normal bulk and tone, no tremor              Strength: 5/5 throughout, no PD  Sensory: intact to LT, PP except distal right thumb  Coordination: FTN, HTS, VANESSA intact, neg romberg  Gait: normal gait  Reflexes: 2+ symmetric, toes downgoing       Assessment/Plan   Pt is a 59 y.o. right handed male with paresthesias with onset while hospitalized for DVT/PE in May, 2018, described as a crawling type feeling under his skin and numbness R>>L hands, particularly his right thumb without change in sxs in last 6 months, had diffuse pruritis, now resolved. NCS/EMG on 10/18/18 with Dr. Huang Mccarthy revealed findings suggest a moderate to severe, distal, symmetric, axonal type of sensory greater than motor polyneuropathy. Most recent HgbA1C is still elevated in pre-diabetes range. Other labs eval for large fiber sensory neuropathy were unremarkable. Exam with dysconjugate eye movements with end gaze nystagmus bilaterally and intermittent brief diplopia, chronic - not appreciated today, dec PP in right distal thumb, o/w is non-focal and unremarkable. Discussed diagnosis of large fiber sensory neuropathy, treatment of underlying cause to prevent progression, and only potential etiology being abnormal glucose metabolism. Right thumb numbness is unlikely related to sensory neuropathy, no etiology seen on previous NCS/EMG. Recommend evaluation at [facility] in the Neuromuscular clinic in order to confirm diagnosis and if accurate, evaluate for potential etiologies that may have been missed. F/u in 6 months, instructed to call in the interim if needed or if notices significant change in sxs. ICD-10-CM ICD-9-CM    1. Sensory neuropathy G62.9 356.9 REFERRAL TO NEUROLOGY   2.  Numbness and tingling of right thumb R20.0 782.0 REFERRAL TO NEUROLOGY    R20.2         Signed:   Chasidy Martin MD  8/2/2019

## 2019-08-06 ENCOUNTER — OFFICE VISIT (OUTPATIENT)
Dept: CARDIOLOGY CLINIC | Age: 59
End: 2019-08-06

## 2019-08-06 VITALS
HEIGHT: 73 IN | BODY MASS INDEX: 27.7 KG/M2 | HEART RATE: 57 BPM | DIASTOLIC BLOOD PRESSURE: 82 MMHG | WEIGHT: 209 LBS | RESPIRATION RATE: 17 BRPM | SYSTOLIC BLOOD PRESSURE: 120 MMHG | OXYGEN SATURATION: 99 %

## 2019-08-06 DIAGNOSIS — R00.2 PALPITATIONS: ICD-10-CM

## 2019-08-06 DIAGNOSIS — I10 ESSENTIAL HYPERTENSION: ICD-10-CM

## 2019-08-06 DIAGNOSIS — I77.810 DILATED AORTIC ROOT (HCC): Primary | ICD-10-CM

## 2019-08-06 DIAGNOSIS — Z86.711 HISTORY OF PULMONARY EMBOLUS (PE): ICD-10-CM

## 2019-08-06 DIAGNOSIS — Z86.718 HISTORY OF DVT (DEEP VEIN THROMBOSIS): ICD-10-CM

## 2019-08-06 NOTE — PROGRESS NOTES
HISTORY OF PRESENT ILLNESS  Reynaldo Harrison is a 59 y.o. male. He is seen in the office after an absence of about two years. He had treated hypertension in the past and has been noted also to have minimal dilatation of aortic root. He has a history of deep venous thrombosis and pulmonary emboli. He continues on Eliquis. I reviewed all labs from his primary care physician today. He has had two CT angiograms done in the past that did not comment on aortic root enlargement but his cardiac echocardiogram did suggest mild dilatation. His last echocardiogram was done in May 2018. He is now retired and does aerobics and lifts weights almost every day. He now takes lisinopril with hydrochlorothiazide with good control of his blood pressure. 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 90 Tab 1    apixaban (ELIQUIS) 5 mg tablet TAKE 1 TALBET BY MOUTH 2 TIMES A  Tab 3     Past Medical History:   Diagnosis Date    Bilateral pulmonary embolism ([address] 5/22/2018    BPH (benign prostatic hyperplasia)     DVT, bilateral lower limbs ([address] 5/22/2018    Gout     HTN (hypertension)     Hypertension     Neutropenia ([address]     Prediabetes     [facility]     2009 & 2012    Thromboembolus [facility])     2009 & 2012. 2014 elevated D dimer.       Past Surgical History:   Procedure Laterality Date    HX COLONOSCOPY  2012       Review of Systems   Constitutional: Negative. HENT: Negative. Eyes: Negative. Respiratory: Negative. Cardiovascular: Negative. Gastrointestinal: Negative. Musculoskeletal: Negative. Skin: Negative. Neurological: Negative. Endo/Heme/Allergies: Negative. Visit Vitals  /82 (BP 1 Location: Left arm, BP Patient Position: Sitting)   Pulse (!) 57   Resp 17   Ht 6' 1\" (1.854 m)   Wt 209 lb (94.8 kg)   SpO2 99%   BMI 27.57 kg/m²       Physical Exam   Constitutional: He is oriented to person, place, and time. He appears well-nourished. HENT:   Head: Atraumatic. Eyes: Conjunctivae are normal.   Neck: Neck supple. Cardiovascular: Normal rate, regular rhythm and normal heart sounds. Exam reveals no gallop and no friction rub. No murmur heard. Pulmonary/Chest: Breath sounds normal. He has no wheezes. Abdominal: Bowel sounds are normal. There is no tenderness. Musculoskeletal: He exhibits no edema. Neurological: He is oriented to person, place, and time. No cranial nerve deficit. Skin: Skin is dry. Psychiatric: His behavior is normal.   Nursing note and vitals reviewed. ASSESSMENT and PLAN  Her blood pressure is well controlled today. I do not hear any murmur of aortic insufficiency. He has no recurrence of venous thrombosis. I will order an echocardiogram in the office and we will call him regarding the results primarily looking at the size of the aortic root. I will also give him a follow up appointment to have another echocardiogram done in one year to see if there has been any change. If the aorta has enlarged significantly, then he will need a CT angiogram to be done. English

## 2019-08-08 ENCOUNTER — TELEPHONE (OUTPATIENT)
Dept: NEUROLOGY | Age: 59
End: 2019-08-08

## 2019-08-08 ENCOUNTER — HOSPITAL ENCOUNTER (OUTPATIENT)
Dept: MRI IMAGING | Age: 59
Discharge: HOME | End: 2019-08-08
Attending: FAMILY MEDICINE
Payer: COMMERCIAL

## 2019-08-08 DIAGNOSIS — G62.9 POLYNEUROPATHY, UNSPECIFIED: ICD-10-CM

## 2019-08-08 PROCEDURE — 72141 MRI NECK SPINE W/O DYE: CPT

## 2019-08-08 NOTE — TELEPHONE ENCOUNTER
Called pt to discuss MRI results. Called Dr. Vincent Richardson who is referring pt to Dr. Leo Bazzi for urgent evaluation. Pt told Dr Vincent Richardson that if he extends his neck posteriorly he gets a transient electric shock sensation in his hand, prompting MRI C-spine. Pt has not heard from [facility], instructed pt to not make an appt if they call.      Davie - Please call [facility] to cancel my referral.

## 2019-08-08 NOTE — TELEPHONE ENCOUNTER
Dr. Gutierrez Shallow calling to speak with you re pt. No other detailed were given.    He left his mobile number: [phone]

## 2019-08-13 ENCOUNTER — TELEPHONE (OUTPATIENT)
Dept: CARDIOLOGY CLINIC | Age: 59
End: 2019-08-13

## 2019-08-13 NOTE — TELEPHONE ENCOUNTER
Called patient. Verified patient's identity with two identifiers. Notified patient of results and Dr. Mosher Rings message. Patient is already schedule for appointment in 1 year with an echo. Patient verbalized understanding and denied further questions or concerns.        Future Appointments   Date Time Provider Thuy Villegas   10/9/2019 10:00 AM LUIS ARMANDO Solorio   8/6/2020  9:00 AM ECHO, [id number] [address]   8/6/2020  9:40 AM Mark Malik  [address]

## 2019-08-13 NOTE — TELEPHONE ENCOUNTER
----- Message from Marielle Barrett MD sent at 8/13/2019 12:38 PM EDT -----  Aortic root minimally enlarged does not need CT, can be seen in one year with echo.

## 2019-09-30 ENCOUNTER — HOSPITAL ENCOUNTER (OUTPATIENT)
Dept: PREADMISSION TESTING | Age: 59
Discharge: HOME | End: 2019-09-30
Payer: COMMERCIAL

## 2019-09-30 VITALS
TEMPERATURE: 98.1 F | BODY MASS INDEX: 27.59 KG/M2 | HEART RATE: 6 BPM | SYSTOLIC BLOOD PRESSURE: 177 MMHG | WEIGHT: 208.13 LBS | DIASTOLIC BLOOD PRESSURE: 89 MMHG | HEIGHT: 73 IN | OXYGEN SATURATION: 99 %

## 2019-09-30 LAB
ANION GAP SERPL CALC-SCNC: 5 MMOL/L (ref 5–15)
BASOPHILS # BLD: 0 K/UL (ref 0–0.1)
BASOPHILS NFR BLD: 1 % (ref 0–1)
BUN SERPL-MCNC: 12 MG/DL (ref 6–20)
BUN/CREAT SERPL: 11 (ref 12–20)
CALCIUM SERPL-MCNC: 9.5 MG/DL (ref 8.5–10.1)
CHLORIDE SERPL-SCNC: 105 MMOL/L (ref 97–108)
CO2 SERPL-SCNC: 30 MMOL/L (ref 21–32)
CREAT SERPL-MCNC: 1.13 MG/DL (ref 0.7–1.3)
DIFFERENTIAL METHOD BLD: ABNORMAL
EOSINOPHIL # BLD: 0.1 K/UL (ref 0–0.4)
EOSINOPHIL NFR BLD: 4 % (ref 0–7)
ERYTHROCYTE [DISTWIDTH] IN BLOOD BY AUTOMATED COUNT: 15.5 % (ref 11.5–14.5)
GLUCOSE SERPL-MCNC: 95 MG/DL (ref 65–100)
HCT VFR BLD AUTO: 45.7 % (ref 36.6–50.3)
HGB BLD-MCNC: 14.1 G/DL (ref 12.1–17)
IMM GRANULOCYTES # BLD AUTO: 0 K/UL (ref 0–0.04)
IMM GRANULOCYTES NFR BLD AUTO: 0 % (ref 0–0.5)
LYMPHOCYTES # BLD: 1.6 K/UL (ref 0.8–3.5)
LYMPHOCYTES NFR BLD: 46 % (ref 12–49)
MCH RBC QN AUTO: 25.9 PG (ref 26–34)
MCHC RBC AUTO-ENTMCNC: 30.9 G/DL (ref 30–36.5)
MCV RBC AUTO: 83.9 FL (ref 80–99)
MONOCYTES # BLD: 0.4 K/UL (ref 0–1)
MONOCYTES NFR BLD: 13 % (ref 5–13)
NEUTS SEG # BLD: 1.2 K/UL (ref 1.8–8)
NEUTS SEG NFR BLD: 36 % (ref 32–75)
NRBC # BLD: 0 K/UL (ref 0–0.01)
NRBC BLD-RTO: 0 PER 100 WBC
PLATELET # BLD AUTO: 227 K/UL (ref 150–400)
PMV BLD AUTO: 10.2 FL (ref 8.9–12.9)
POTASSIUM SERPL-SCNC: 4.2 MMOL/L (ref 3.5–5.1)
RBC # BLD AUTO: 5.45 M/UL (ref 4.1–5.7)
SODIUM SERPL-SCNC: 140 MMOL/L (ref 136–145)
WBC # BLD AUTO: 3.4 K/UL (ref 4.1–11.1)

## 2019-09-30 PROCEDURE — 85025 COMPLETE CBC W/AUTO DIFF WBC: CPT

## 2019-09-30 PROCEDURE — 80048 BASIC METABOLIC PNL TOTAL CA: CPT

## 2019-09-30 PROCEDURE — 36415 COLL VENOUS BLD VENIPUNCTURE: CPT

## 2019-10-01 LAB
BACTERIA SPEC CULT: NORMAL
BACTERIA SPEC CULT: NORMAL
SERVICE CMNT-IMP: NORMAL

## 2019-10-01 NOTE — PERIOP NOTES
Faxed PAT testing reports (and fax confirmation received) to Dr. Ellen Wooten office. Called at [phone] on 10/1/19 (left message with Lorrie) RE: abnormal WBC count done 9/30/19.     WBC: 3.4

## 2019-10-02 ENCOUNTER — APPOINTMENT (OUTPATIENT)
Dept: CT IMAGING | Age: 59
End: 2019-10-02
Attending: EMERGENCY MEDICINE
Payer: COMMERCIAL

## 2019-10-02 ENCOUNTER — HOSPITAL ENCOUNTER (EMERGENCY)
Age: 59
Discharge: HOME | End: 2019-10-02
Attending: EMERGENCY MEDICINE
Payer: COMMERCIAL

## 2019-10-02 VITALS
SYSTOLIC BLOOD PRESSURE: 145 MMHG | DIASTOLIC BLOOD PRESSURE: 85 MMHG | HEART RATE: 64 BPM | RESPIRATION RATE: 18 BRPM | OXYGEN SATURATION: 100 % | TEMPERATURE: 98.1 F

## 2019-10-02 DIAGNOSIS — R06.00 DYSPNEA, UNSPECIFIED TYPE: Primary | ICD-10-CM

## 2019-10-02 LAB
ALBUMIN SERPL-MCNC: 3.8 G/DL (ref 3.5–5)
ALBUMIN/GLOB SERPL: 0.8 {RATIO} (ref 1.1–2.2)
ALP SERPL-CCNC: 68 U/L (ref 45–117)
ALT SERPL-CCNC: 33 U/L (ref 12–78)
ANION GAP SERPL CALC-SCNC: 7 MMOL/L (ref 5–15)
AST SERPL-CCNC: 40 U/L (ref 15–37)
ATRIAL RATE: 65 BPM
BASOPHILS # BLD: 0 K/UL (ref 0–0.1)
BASOPHILS NFR BLD: 1 % (ref 0–1)
BILIRUB SERPL-MCNC: 1.1 MG/DL (ref 0.2–1)
BNP SERPL-MCNC: 181 PG/ML
BUN SERPL-MCNC: 18 MG/DL (ref 6–20)
BUN/CREAT SERPL: 14 (ref 12–20)
CALCIUM SERPL-MCNC: 9.5 MG/DL (ref 8.5–10.1)
CALCULATED P AXIS, ECG09: 65 DEGREES
CALCULATED R AXIS, ECG10: -44 DEGREES
CALCULATED T AXIS, ECG11: 62 DEGREES
CHLORIDE SERPL-SCNC: 105 MMOL/L (ref 97–108)
CO2 SERPL-SCNC: 29 MMOL/L (ref 21–32)
COMMENT, HOLDF: NORMAL
CREAT SERPL-MCNC: 1.3 MG/DL (ref 0.7–1.3)
DIAGNOSIS, 93000: NORMAL
DIFFERENTIAL METHOD BLD: ABNORMAL
EOSINOPHIL # BLD: 0.2 K/UL (ref 0–0.4)
EOSINOPHIL NFR BLD: 4 % (ref 0–7)
ERYTHROCYTE [DISTWIDTH] IN BLOOD BY AUTOMATED COUNT: 15.9 % (ref 11.5–14.5)
GLOBULIN SER CALC-MCNC: 4.6 G/DL (ref 2–4)
GLUCOSE SERPL-MCNC: 105 MG/DL (ref 65–100)
HCT VFR BLD AUTO: 46.2 % (ref 36.6–50.3)
HGB BLD-MCNC: 14.5 G/DL (ref 12.1–17)
IMM GRANULOCYTES # BLD AUTO: 0 K/UL (ref 0–0.04)
IMM GRANULOCYTES NFR BLD AUTO: 0 % (ref 0–0.5)
LYMPHOCYTES # BLD: 2.7 K/UL (ref 0.8–3.5)
LYMPHOCYTES NFR BLD: 56 % (ref 12–49)
MCH RBC QN AUTO: 26.1 PG (ref 26–34)
MCHC RBC AUTO-ENTMCNC: 31.4 G/DL (ref 30–36.5)
MCV RBC AUTO: 83.2 FL (ref 80–99)
MONOCYTES # BLD: 0.5 K/UL (ref 0–1)
MONOCYTES NFR BLD: 10 % (ref 5–13)
NEUTS SEG # BLD: 1.4 K/UL (ref 1.8–8)
NEUTS SEG NFR BLD: 29 % (ref 32–75)
NRBC # BLD: 0 K/UL (ref 0–0.01)
NRBC BLD-RTO: 0 PER 100 WBC
P-R INTERVAL, ECG05: 164 MS
PLATELET # BLD AUTO: 243 K/UL (ref 150–400)
PMV BLD AUTO: 9.9 FL (ref 8.9–12.9)
POTASSIUM SERPL-SCNC: 4.2 MMOL/L (ref 3.5–5.1)
PROT SERPL-MCNC: 8.4 G/DL (ref 6.4–8.2)
Q-T INTERVAL, ECG07: 384 MS
QRS DURATION, ECG06: 114 MS
QTC CALCULATION (BEZET), ECG08: 399 MS
RBC # BLD AUTO: 5.55 M/UL (ref 4.1–5.7)
SAMPLES BEING HELD,HOLD: NORMAL
SODIUM SERPL-SCNC: 141 MMOL/L (ref 136–145)
TROPONIN I SERPL-MCNC: <0.05 NG/ML
VENTRICULAR RATE, ECG03: 65 BPM
WBC # BLD AUTO: 4.8 K/UL (ref 4.1–11.1)

## 2019-10-02 PROCEDURE — 99284 EMERGENCY DEPT VISIT MOD MDM: CPT

## 2019-10-02 PROCEDURE — 84484 ASSAY OF TROPONIN QUANT: CPT

## 2019-10-02 PROCEDURE — 80053 COMPREHEN METABOLIC PANEL: CPT

## 2019-10-02 PROCEDURE — 93005 ELECTROCARDIOGRAM TRACING: CPT

## 2019-10-02 PROCEDURE — 85025 COMPLETE CBC W/AUTO DIFF WBC: CPT

## 2019-10-02 PROCEDURE — 71275 CT ANGIOGRAPHY CHEST: CPT

## 2019-10-02 PROCEDURE — 36415 COLL VENOUS BLD VENIPUNCTURE: CPT

## 2019-10-02 PROCEDURE — 83880 ASSAY OF NATRIURETIC PEPTIDE: CPT

## 2019-10-02 PROCEDURE — 74011636320 HC RX REV CODE- 636/320: Performed by: RADIOLOGY

## 2019-10-02 PROCEDURE — 74011000258 HC RX REV CODE- 258: Performed by: RADIOLOGY

## 2019-10-02 RX ORDER — GLUCOSAMINE SULFATE 1500 MG
POWDER IN PACKET (EA) ORAL
COMMUNITY
End: 2019-10-12

## 2019-10-02 RX ORDER — COLCHICINE 0.6 MG/1
TABLET ORAL
Refills: 0 | COMMUNITY
Start: 2019-08-16

## 2019-10-02 RX ORDER — INDOMETHACIN 50 MG/1
CAPSULE ORAL
Refills: 0 | COMMUNITY
Start: 2019-08-16 | End: 2019-10-08

## 2019-10-02 RX ORDER — SODIUM CHLORIDE 0.9 % (FLUSH) 0.9 %
10 SYRINGE (ML) INJECTION
Status: COMPLETED | OUTPATIENT
Start: 2019-10-02 | End: 2019-10-02

## 2019-10-02 RX ADMIN — IOPAMIDOL 80 ML: 755 INJECTION, SOLUTION INTRAVENOUS at 16:10

## 2019-10-02 RX ADMIN — SODIUM CHLORIDE 100 ML: 900 INJECTION, SOLUTION INTRAVENOUS at 16:10

## 2019-10-02 RX ADMIN — Medication 10 ML: at 16:10

## 2019-10-02 NOTE — ED PROVIDER NOTES
Shortness of Breath   This is a recurrent problem. The problem occurs rarely. The current episode started yesterday. The problem has been resolved. Associated symptoms include chest pain. Pertinent negatives include no fever, no headaches, no coryza, no rhinorrhea, no sore throat, no swollen glands, no ear pain, no neck pain, no cough, no sputum production, no hemoptysis, no wheezing, no PND, no orthopnea, no syncope, no vomiting, no abdominal pain, no rash, no leg pain, no leg swelling and no claudication. It is unknown what precipitated the problem. He has tried nothing for the symptoms. The treatment provided significant relief. He has had prior hospitalizations. He has had prior ED visits. Associated medical issues include PE. Associated medical issues do not include asthma, COPD, pneumonia, chronic lung disease, CAD, heart failure, past MI, DVT or recent surgery. Past Medical History:   Diagnosis Date    Bilateral pulmonary embolism ([address] 5/22/2018    BPH (benign prostatic hyperplasia)     DVT, bilateral lower limbs ([facility] 5/22/2018    Gout     HTN (hypertension)     Hypertension     Neutropenia ([facility]     Prediabetes     Thromboembolus ([facility]     2009 & 2012    Thromboembolus [facility])     2009 & 2012. 2014 elevated D dimer.         Past Surgical History:   Procedure Laterality Date    HX COLONOSCOPY  2012    HX COLONOSCOPY  2014    HX HEENT  2015    ORAL SURGERY X 3    HX HEENT  04/2015    SINUS SURGERY    HX KNEE ARTHROSCOPY Left          Family History:   Problem Relation Age of Onset    Cancer Mother         multiple myeloma    Diabetes Mother     Hypertension Mother     Stroke Mother     Cancer Father         pancreatic    Heart Disease Father     Hypertension Father     No Known Problems Brother     Hypertension Brother         Resolved    Other Brother         Sarcoid    No Known Problems Son     No Known Problems Daughter     Anesth Problems Neg Hx        Social History Socioeconomic History    Marital status:      Spouse name: Not on file    Number of children: Not on file    Years of education: Not on file    Highest education level: Not on file   Occupational History    Occupation: Retired   Social Needs    Financial resource strain: Not on file    Food insecurity:     Worry: Not on file     Inability: Not on file   [employer] [place] needs:     Medical: Not on file     Non-medical: Not on file   Tobacco Use    Smoking status: Never Smoker    Smokeless tobacco: Never Used   Substance and Sexual Activity    Alcohol use: No    Drug use: No    Sexual activity: Yes     Partners: Female     Comment:  [employer]   Lifestyle    Physical activity:     Days per week: Not on file     Minutes per session: Not on file    Stress: Not on file   Relationships    Social connections:     Talks on phone: Not on file     Gets together: Not on file     Attends Anabaptist service: Not on file     Active member of club or organization: Not on file     Attends meetings of clubs or organizations: Not on file     Relationship status: Not on file    Intimate partner violence:     Fear of current or ex partner: Not on file     Emotionally abused: Not on file     Physically abused: Not on file     Forced sexual activity: Not on file   Other Topics Concern    Not on file   Social History Narrative    Lives in [facility] [place] [facility] with wife         ALLERGIES: Levaquin [levofloxacin]    Review of Systems   Constitutional: Negative for activity change, chills and fever. HENT: Negative for ear pain, nosebleeds, rhinorrhea, sore throat, trouble swallowing and voice change. Eyes: Negative for visual disturbance. Respiratory: Positive for shortness of breath. Negative for cough, hemoptysis, sputum production and wheezing. Cardiovascular: Positive for chest pain and palpitations. Negative for orthopnea, claudication, leg swelling, syncope and PND.    Gastrointestinal: Negative for abdominal pain, constipation, diarrhea, nausea and vomiting. Genitourinary: Negative for difficulty urinating, dysuria, hematuria and urgency. Musculoskeletal: Negative for back pain, neck pain and neck stiffness. Skin: Negative for color change and rash. Allergic/Immunologic: Negative for immunocompromised state. Neurological: Negative for dizziness, seizures, syncope, weakness, light-headedness, numbness and headaches. Psychiatric/Behavioral: Negative for behavioral problems, confusion, hallucinations, self-injury and suicidal ideas. Vitals:    10/02/19 1307 10/02/19 1351 10/02/19 1527   BP:  137/84 145/85   Pulse: 88 78 64   Resp:  16 18   Temp:  98.4 °F (36.9 °C)    SpO2: 98% 98% 100%            Physical Exam   Constitutional: He is oriented to person, place, and time. He appears well-developed and well-nourished. No distress. HENT:   Head: Normocephalic and atraumatic. Eyes: Pupils are equal, round, and reactive to light. Neck: Normal range of motion. Neck supple. Cardiovascular: Normal rate, regular rhythm and normal heart sounds. Exam reveals no gallop and no friction rub. No murmur heard. Pulmonary/Chest: Effort normal and breath sounds normal. No respiratory distress. He has no wheezes. Abdominal: Soft. Bowel sounds are normal. He exhibits no distension. There is no tenderness. There is no rebound and no guarding. Musculoskeletal: Normal range of motion. Neurological: He is alert and oriented to person, place, and time. Skin: Skin is warm. No rash noted. He is not diaphoretic. Psychiatric: He has a normal mood and affect. His behavior is normal. Judgment and thought content normal.   Nursing note and vitals reviewed. MDM     58YOM with PMHx PE on [facility] presenting with complaints of intermittent CP, LONG, SOB, palpitations starting yesterday. PE reassuring, lab work thus far unremarkable, will add BNP and obtain CTA, disposition pending.       Procedures

## 2019-10-02 NOTE — DISCHARGE INSTRUCTIONS

## 2019-10-04 ENCOUNTER — ANESTHESIA EVENT (OUTPATIENT)
Dept: SURGERY | Age: 59
DRG: 472 | End: 2019-10-04
Payer: COMMERCIAL

## 2019-10-07 ENCOUNTER — ANESTHESIA (OUTPATIENT)
Dept: SURGERY | Age: 59
DRG: 472 | End: 2019-10-07
Payer: COMMERCIAL

## 2019-10-07 ENCOUNTER — APPOINTMENT (OUTPATIENT)
Dept: GENERAL RADIOLOGY | Age: 59
DRG: 472 | End: 2019-10-07
Attending: NEUROLOGICAL SURGERY
Payer: COMMERCIAL

## 2019-10-07 ENCOUNTER — HOSPITAL ENCOUNTER (INPATIENT)
Age: 59
LOS: 1 days | Discharge: HOME | DRG: 472 | End: 2019-10-08
Attending: NEUROLOGICAL SURGERY | Admitting: NEUROLOGICAL SURGERY
Payer: COMMERCIAL

## 2019-10-07 DIAGNOSIS — M48.02 CERVICAL STENOSIS OF SPINAL CANAL: Primary | ICD-10-CM

## 2019-10-07 PROCEDURE — 74011250637 HC RX REV CODE- 250/637: Performed by: ANESTHESIOLOGY

## 2019-10-07 PROCEDURE — 77030008684 HC TU ET CUF COVD -B: Performed by: ANESTHESIOLOGY

## 2019-10-07 PROCEDURE — 77030029099 HC BN WAX SSPC -A: Performed by: NEUROLOGICAL SURGERY

## 2019-10-07 PROCEDURE — 77030011267 HC ELECTRD BLD COVD -A: Performed by: NEUROLOGICAL SURGERY

## 2019-10-07 PROCEDURE — 74011250636 HC RX REV CODE- 250/636: Performed by: NEUROLOGICAL SURGERY

## 2019-10-07 PROCEDURE — 74011250637 HC RX REV CODE- 250/637: Performed by: NEUROLOGICAL SURGERY

## 2019-10-07 PROCEDURE — 77030003666 HC NDL SPINAL BD -A: Performed by: NEUROLOGICAL SURGERY

## 2019-10-07 PROCEDURE — 74011000250 HC RX REV CODE- 250

## 2019-10-07 PROCEDURE — 76210000016 HC OR PH I REC 1 TO 1.5 HR: Performed by: NEUROLOGICAL SURGERY

## 2019-10-07 PROCEDURE — 74011000272 HC RX REV CODE- 272: Performed by: NEUROLOGICAL SURGERY

## 2019-10-07 PROCEDURE — 77030004391 HC BUR FLUT MEDT -C: Performed by: NEUROLOGICAL SURGERY

## 2019-10-07 PROCEDURE — 77030030028 HC BIT DRL SPN FLAT CHK DSP J&J -B: Performed by: NEUROLOGICAL SURGERY

## 2019-10-07 PROCEDURE — 74011250636 HC RX REV CODE- 250/636: Performed by: ANESTHESIOLOGY

## 2019-10-07 PROCEDURE — 77030012890

## 2019-10-07 PROCEDURE — 77030020268 HC MISC GENERAL SUPPLY: Performed by: NEUROLOGICAL SURGERY

## 2019-10-07 PROCEDURE — 74011250636 HC RX REV CODE- 250/636: Performed by: NURSE ANESTHETIST, CERTIFIED REGISTERED

## 2019-10-07 PROCEDURE — 77030040361 HC SLV COMPR DVT MDII -B: Performed by: NEUROLOGICAL SURGERY

## 2019-10-07 PROCEDURE — 77030039970 HC GRFT BN SUB PRIME HD MUSC -D: Performed by: NEUROLOGICAL SURGERY

## 2019-10-07 PROCEDURE — C1713 ANCHOR/SCREW BN/BN,TIS/BN: HCPCS | Performed by: NEUROLOGICAL SURGERY

## 2019-10-07 PROCEDURE — 99218 HC RM OBSERVATION: CPT

## 2019-10-07 PROCEDURE — 77030005513 HC CATH URETH FOL11 MDII -B: Performed by: NEUROLOGICAL SURGERY

## 2019-10-07 PROCEDURE — 77030038600 HC TU BPLR IRR DISP STRY -B: Performed by: NEUROLOGICAL SURGERY

## 2019-10-07 PROCEDURE — 77030002933 HC SUT MCRYL J&J -A: Performed by: NEUROLOGICAL SURGERY

## 2019-10-07 PROCEDURE — 77030040423 HC CGE SPN CERV LORDTC ACIS J&J -G: Performed by: NEUROLOGICAL SURGERY

## 2019-10-07 PROCEDURE — 76010000173 HC OR TIME 3 TO 3.5 HR INTENSV-TIER 1: Performed by: NEUROLOGICAL SURGERY

## 2019-10-07 PROCEDURE — 65270000029 HC RM PRIVATE

## 2019-10-07 PROCEDURE — 74011000250 HC RX REV CODE- 250: Performed by: NEUROLOGICAL SURGERY

## 2019-10-07 PROCEDURE — 77030039266 HC ADH SKN EXOFIN S2SG -A: Performed by: NEUROLOGICAL SURGERY

## 2019-10-07 PROCEDURE — 51798 US URINE CAPACITY MEASURE: CPT

## 2019-10-07 PROCEDURE — 77030026438 HC STYL ET INTUB CARD -A: Performed by: ANESTHESIOLOGY

## 2019-10-07 PROCEDURE — 74011000250 HC RX REV CODE- 250: Performed by: NURSE ANESTHETIST, CERTIFIED REGISTERED

## 2019-10-07 PROCEDURE — 0RG20A0 FUSION OF 2 OR MORE CERVICAL VERTEBRAL JOINTS WITH INTERBODY FUSION DEVICE, ANTERIOR APPROACH, ANTERIOR COLUMN, OPEN APPROACH: ICD-10-PCS | Performed by: NEUROLOGICAL SURGERY

## 2019-10-07 PROCEDURE — 77030021678 HC GLIDESCP STAT DISP VERT -B: Performed by: ANESTHESIOLOGY

## 2019-10-07 PROCEDURE — 77030003029 HC SUT VCRL J&J -B: Performed by: NEUROLOGICAL SURGERY

## 2019-10-07 PROCEDURE — 77030014647 HC SEAL FBRN TISSL BAXT -D: Performed by: NEUROLOGICAL SURGERY

## 2019-10-07 PROCEDURE — 0RT30ZZ RESECTION OF CERVICAL VERTEBRAL DISC, OPEN APPROACH: ICD-10-PCS | Performed by: NEUROLOGICAL SURGERY

## 2019-10-07 PROCEDURE — 77030012406 HC DRN WND PENRS BARD -A: Performed by: NEUROLOGICAL SURGERY

## 2019-10-07 PROCEDURE — 76060000037 HC ANESTHESIA 3 TO 3.5 HR: Performed by: NEUROLOGICAL SURGERY

## 2019-10-07 PROCEDURE — 77030018836 HC SOL IRR NACL ICUM -A: Performed by: NEUROLOGICAL SURGERY

## 2019-10-07 PROCEDURE — 74011250636 HC RX REV CODE- 250/636

## 2019-10-07 DEVICE — IMPLANTABLE DEVICE: Type: IMPLANTABLE DEVICE | Site: SPINE CERVICAL | Status: FUNCTIONAL

## 2019-10-07 DEVICE — SCREW SPNL L16MM DIA4MM ANT CERV TI SELF DRL VAR ANG FULL: Type: IMPLANTABLE DEVICE | Site: SPINE CERVICAL | Status: FUNCTIONAL

## 2019-10-07 DEVICE — SCREW SPNL L16MM DIA4MM ANT CERV TI SELF DRL FULL THRD: Type: IMPLANTABLE DEVICE | Site: SPINE CERVICAL | Status: FUNCTIONAL

## 2019-10-07 DEVICE — SPACER SPNL W13XH7XL14MM ST ANT CERV PEEK TI ALLY LORD STD: Type: IMPLANTABLE DEVICE | Site: SPINE CERVICAL | Status: FUNCTIONAL

## 2019-10-07 DEVICE — GRAFT BONE SUB 2.5CC DEMIN BONE MTRX PRIM HD: Type: IMPLANTABLE DEVICE | Site: SPINE CERVICAL | Status: FUNCTIONAL

## 2019-10-07 RX ORDER — HYDRALAZINE HYDROCHLORIDE 20 MG/ML
10 INJECTION INTRAMUSCULAR; INTRAVENOUS
Status: COMPLETED | OUTPATIENT
Start: 2019-10-07 | End: 2019-10-07

## 2019-10-07 RX ORDER — SODIUM CHLORIDE, SODIUM LACTATE, POTASSIUM CHLORIDE, CALCIUM CHLORIDE 600; 310; 30; 20 MG/100ML; MG/100ML; MG/100ML; MG/100ML
125 INJECTION, SOLUTION INTRAVENOUS CONTINUOUS
Status: DISCONTINUED | OUTPATIENT
Start: 2019-10-07 | End: 2019-10-07 | Stop reason: HOSPADM

## 2019-10-07 RX ORDER — FENTANYL CITRATE 50 UG/ML
INJECTION, SOLUTION INTRAMUSCULAR; INTRAVENOUS AS NEEDED
Status: DISCONTINUED | OUTPATIENT
Start: 2019-10-07 | End: 2019-10-07 | Stop reason: HOSPADM

## 2019-10-07 RX ORDER — DIPHENHYDRAMINE HYDROCHLORIDE 50 MG/ML
12.5 INJECTION, SOLUTION INTRAMUSCULAR; INTRAVENOUS AS NEEDED
Status: DISCONTINUED | OUTPATIENT
Start: 2019-10-07 | End: 2019-10-07 | Stop reason: HOSPADM

## 2019-10-07 RX ORDER — MORPHINE SULFATE 10 MG/ML
2 INJECTION, SOLUTION INTRAMUSCULAR; INTRAVENOUS
Status: DISCONTINUED | OUTPATIENT
Start: 2019-10-07 | End: 2019-10-07 | Stop reason: HOSPADM

## 2019-10-07 RX ORDER — TAMSULOSIN HYDROCHLORIDE 0.4 MG/1
0.4 CAPSULE ORAL DAILY
Status: DISCONTINUED | OUTPATIENT
Start: 2019-10-08 | End: 2019-10-08 | Stop reason: HOSPADM

## 2019-10-07 RX ORDER — LABETALOL HYDROCHLORIDE 5 MG/ML
INJECTION, SOLUTION INTRAVENOUS
Status: COMPLETED
Start: 2019-10-07 | End: 2019-10-07

## 2019-10-07 RX ORDER — EPHEDRINE SULFATE/0.9% NACL/PF 50 MG/5 ML
SYRINGE (ML) INTRAVENOUS AS NEEDED
Status: DISCONTINUED | OUTPATIENT
Start: 2019-10-07 | End: 2019-10-07 | Stop reason: HOSPADM

## 2019-10-07 RX ORDER — SODIUM CHLORIDE 0.9 % (FLUSH) 0.9 %
5-40 SYRINGE (ML) INJECTION AS NEEDED
Status: DISCONTINUED | OUTPATIENT
Start: 2019-10-07 | End: 2019-10-07 | Stop reason: HOSPADM

## 2019-10-07 RX ORDER — SODIUM CHLORIDE 0.9 % (FLUSH) 0.9 %
5-40 SYRINGE (ML) INJECTION EVERY 8 HOURS
Status: DISCONTINUED | OUTPATIENT
Start: 2019-10-07 | End: 2019-10-07 | Stop reason: HOSPADM

## 2019-10-07 RX ORDER — ONDANSETRON 2 MG/ML
INJECTION INTRAMUSCULAR; INTRAVENOUS AS NEEDED
Status: DISCONTINUED | OUTPATIENT
Start: 2019-10-07 | End: 2019-10-07 | Stop reason: HOSPADM

## 2019-10-07 RX ORDER — MELATONIN
1 DAILY
Status: DISCONTINUED | OUTPATIENT
Start: 2019-10-08 | End: 2019-10-08 | Stop reason: HOSPADM

## 2019-10-07 RX ORDER — PHENYLEPHRINE HCL IN 0.9% NACL 0.4MG/10ML
SYRINGE (ML) INTRAVENOUS AS NEEDED
Status: DISCONTINUED | OUTPATIENT
Start: 2019-10-07 | End: 2019-10-07

## 2019-10-07 RX ORDER — HYDROMORPHONE HYDROCHLORIDE 1 MG/ML
1 INJECTION, SOLUTION INTRAMUSCULAR; INTRAVENOUS; SUBCUTANEOUS
Status: DISCONTINUED | OUTPATIENT
Start: 2019-10-07 | End: 2019-10-08 | Stop reason: HOSPADM

## 2019-10-07 RX ORDER — LIDOCAINE HYDROCHLORIDE 10 MG/ML
0.5 INJECTION, SOLUTION EPIDURAL; INFILTRATION; INTRACAUDAL; PERINEURAL AS NEEDED
Status: DISCONTINUED | OUTPATIENT
Start: 2019-10-07 | End: 2019-10-07 | Stop reason: HOSPADM

## 2019-10-07 RX ORDER — MIDAZOLAM HYDROCHLORIDE 1 MG/ML
1 INJECTION, SOLUTION INTRAMUSCULAR; INTRAVENOUS
Status: DISCONTINUED | OUTPATIENT
Start: 2019-10-07 | End: 2019-10-07 | Stop reason: HOSPADM

## 2019-10-07 RX ORDER — MIDAZOLAM HYDROCHLORIDE 1 MG/ML
1 INJECTION, SOLUTION INTRAMUSCULAR; INTRAVENOUS AS NEEDED
Status: DISCONTINUED | OUTPATIENT
Start: 2019-10-07 | End: 2019-10-07 | Stop reason: HOSPADM

## 2019-10-07 RX ORDER — SODIUM CHLORIDE 0.9 % (FLUSH) 0.9 %
5-40 SYRINGE (ML) INJECTION AS NEEDED
Status: DISCONTINUED | OUTPATIENT
Start: 2019-10-07 | End: 2019-10-08 | Stop reason: HOSPADM

## 2019-10-07 RX ORDER — ROCURONIUM BROMIDE 10 MG/ML
INJECTION, SOLUTION INTRAVENOUS AS NEEDED
Status: DISCONTINUED | OUTPATIENT
Start: 2019-10-07 | End: 2019-10-07 | Stop reason: HOSPADM

## 2019-10-07 RX ORDER — ONDANSETRON 2 MG/ML
4 INJECTION INTRAMUSCULAR; INTRAVENOUS AS NEEDED
Status: DISCONTINUED | OUTPATIENT
Start: 2019-10-07 | End: 2019-10-07 | Stop reason: HOSPADM

## 2019-10-07 RX ORDER — CEFAZOLIN SODIUM/WATER 2 G/20 ML
2 SYRINGE (ML) INTRAVENOUS ONCE
Status: COMPLETED | OUTPATIENT
Start: 2019-10-07 | End: 2019-10-07

## 2019-10-07 RX ORDER — OXYCODONE HYDROCHLORIDE 5 MG/1
5 TABLET ORAL AS NEEDED
Status: DISCONTINUED | OUTPATIENT
Start: 2019-10-07 | End: 2019-10-07 | Stop reason: HOSPADM

## 2019-10-07 RX ORDER — SODIUM CHLORIDE 9 MG/ML
125 INJECTION, SOLUTION INTRAVENOUS CONTINUOUS
Status: DISCONTINUED | OUTPATIENT
Start: 2019-10-07 | End: 2019-10-08

## 2019-10-07 RX ORDER — OXYCODONE HYDROCHLORIDE 5 MG/1
10 TABLET ORAL
Status: DISCONTINUED | OUTPATIENT
Start: 2019-10-07 | End: 2019-10-08 | Stop reason: HOSPADM

## 2019-10-07 RX ORDER — DEXTROSE, SODIUM CHLORIDE, SODIUM LACTATE, POTASSIUM CHLORIDE, AND CALCIUM CHLORIDE 5; .6; .31; .03; .02 G/100ML; G/100ML; G/100ML; G/100ML; G/100ML
125 INJECTION, SOLUTION INTRAVENOUS CONTINUOUS
Status: DISCONTINUED | OUTPATIENT
Start: 2019-10-07 | End: 2019-10-07 | Stop reason: HOSPADM

## 2019-10-07 RX ORDER — GLYCOPYRROLATE 0.2 MG/ML
INJECTION INTRAMUSCULAR; INTRAVENOUS AS NEEDED
Status: DISCONTINUED | OUTPATIENT
Start: 2019-10-07 | End: 2019-10-07 | Stop reason: HOSPADM

## 2019-10-07 RX ORDER — MIDAZOLAM HYDROCHLORIDE 1 MG/ML
INJECTION, SOLUTION INTRAMUSCULAR; INTRAVENOUS AS NEEDED
Status: DISCONTINUED | OUTPATIENT
Start: 2019-10-07 | End: 2019-10-07 | Stop reason: HOSPADM

## 2019-10-07 RX ORDER — PROPOFOL 10 MG/ML
INJECTION, EMULSION INTRAVENOUS AS NEEDED
Status: DISCONTINUED | OUTPATIENT
Start: 2019-10-07 | End: 2019-10-07 | Stop reason: HOSPADM

## 2019-10-07 RX ORDER — OXYCODONE HYDROCHLORIDE 5 MG/1
5 TABLET ORAL
Status: DISCONTINUED | OUTPATIENT
Start: 2019-10-07 | End: 2019-10-08 | Stop reason: HOSPADM

## 2019-10-07 RX ORDER — FENTANYL CITRATE 50 UG/ML
50 INJECTION, SOLUTION INTRAMUSCULAR; INTRAVENOUS AS NEEDED
Status: DISCONTINUED | OUTPATIENT
Start: 2019-10-07 | End: 2019-10-07 | Stop reason: HOSPADM

## 2019-10-07 RX ORDER — ACETAMINOPHEN 325 MG/1
650 TABLET ORAL EVERY 6 HOURS
Status: DISCONTINUED | OUTPATIENT
Start: 2019-10-07 | End: 2019-10-08 | Stop reason: HOSPADM

## 2019-10-07 RX ORDER — HYDRALAZINE HYDROCHLORIDE 20 MG/ML
10 INJECTION INTRAMUSCULAR; INTRAVENOUS ONCE
Status: COMPLETED | OUTPATIENT
Start: 2019-10-07 | End: 2019-10-07

## 2019-10-07 RX ORDER — HYDROMORPHONE HYDROCHLORIDE 2 MG/ML
INJECTION, SOLUTION INTRAMUSCULAR; INTRAVENOUS; SUBCUTANEOUS AS NEEDED
Status: DISCONTINUED | OUTPATIENT
Start: 2019-10-07 | End: 2019-10-07 | Stop reason: HOSPADM

## 2019-10-07 RX ORDER — LABETALOL HYDROCHLORIDE 5 MG/ML
10 INJECTION, SOLUTION INTRAVENOUS
Status: COMPLETED | OUTPATIENT
Start: 2019-10-07 | End: 2019-10-07

## 2019-10-07 RX ORDER — ONDANSETRON 2 MG/ML
4 INJECTION INTRAMUSCULAR; INTRAVENOUS
Status: DISCONTINUED | OUTPATIENT
Start: 2019-10-07 | End: 2019-10-08 | Stop reason: HOSPADM

## 2019-10-07 RX ORDER — SCOLOPAMINE TRANSDERMAL SYSTEM 1 MG/1
1 PATCH, EXTENDED RELEASE TRANSDERMAL
Status: DISCONTINUED | OUTPATIENT
Start: 2019-10-07 | End: 2019-10-08 | Stop reason: HOSPADM

## 2019-10-07 RX ORDER — ACETAMINOPHEN 325 MG/1
650 TABLET ORAL ONCE
Status: COMPLETED | OUTPATIENT
Start: 2019-10-07 | End: 2019-10-07

## 2019-10-07 RX ORDER — SODIUM CHLORIDE 0.9 % (FLUSH) 0.9 %
5-40 SYRINGE (ML) INJECTION EVERY 8 HOURS
Status: DISCONTINUED | OUTPATIENT
Start: 2019-10-07 | End: 2019-10-08 | Stop reason: HOSPADM

## 2019-10-07 RX ORDER — FENTANYL CITRATE 50 UG/ML
25 INJECTION, SOLUTION INTRAMUSCULAR; INTRAVENOUS
Status: DISCONTINUED | OUTPATIENT
Start: 2019-10-07 | End: 2019-10-07 | Stop reason: HOSPADM

## 2019-10-07 RX ORDER — LIDOCAINE HYDROCHLORIDE 20 MG/ML
INJECTION, SOLUTION EPIDURAL; INFILTRATION; INTRACAUDAL; PERINEURAL AS NEEDED
Status: DISCONTINUED | OUTPATIENT
Start: 2019-10-07 | End: 2019-10-07 | Stop reason: HOSPADM

## 2019-10-07 RX ORDER — NALOXONE HYDROCHLORIDE 0.4 MG/ML
0.4 INJECTION, SOLUTION INTRAMUSCULAR; INTRAVENOUS; SUBCUTANEOUS AS NEEDED
Status: DISCONTINUED | OUTPATIENT
Start: 2019-10-07 | End: 2019-10-08 | Stop reason: HOSPADM

## 2019-10-07 RX ORDER — DEXAMETHASONE SODIUM PHOSPHATE 4 MG/ML
INJECTION, SOLUTION INTRA-ARTICULAR; INTRALESIONAL; INTRAMUSCULAR; INTRAVENOUS; SOFT TISSUE AS NEEDED
Status: DISCONTINUED | OUTPATIENT
Start: 2019-10-07 | End: 2019-10-07 | Stop reason: HOSPADM

## 2019-10-07 RX ORDER — CEFAZOLIN SODIUM/WATER 2 G/20 ML
2 SYRINGE (ML) INTRAVENOUS EVERY 8 HOURS
Status: COMPLETED | OUTPATIENT
Start: 2019-10-07 | End: 2019-10-08

## 2019-10-07 RX ORDER — HYDRALAZINE HYDROCHLORIDE 20 MG/ML
INJECTION INTRAMUSCULAR; INTRAVENOUS
Status: COMPLETED
Start: 2019-10-07 | End: 2019-10-07

## 2019-10-07 RX ORDER — DIAZEPAM 5 MG/1
5 TABLET ORAL
Status: DISCONTINUED | OUTPATIENT
Start: 2019-10-07 | End: 2019-10-08 | Stop reason: HOSPADM

## 2019-10-07 RX ORDER — AMOXICILLIN 250 MG
1 CAPSULE ORAL 2 TIMES DAILY
Status: DISCONTINUED | OUTPATIENT
Start: 2019-10-07 | End: 2019-10-08 | Stop reason: HOSPADM

## 2019-10-07 RX ADMIN — SODIUM CHLORIDE 125 ML/HR: 900 INJECTION, SOLUTION INTRAVENOUS at 13:45

## 2019-10-07 RX ADMIN — HYDRALAZINE HYDROCHLORIDE 10 MG: 20 INJECTION INTRAMUSCULAR; INTRAVENOUS at 13:39

## 2019-10-07 RX ADMIN — ROCURONIUM BROMIDE 5 MG: 10 SOLUTION INTRAVENOUS at 10:00

## 2019-10-07 RX ADMIN — ROCURONIUM BROMIDE 10 MG: 10 SOLUTION INTRAVENOUS at 08:04

## 2019-10-07 RX ADMIN — HYDRALAZINE HYDROCHLORIDE 10 MG: 20 INJECTION INTRAMUSCULAR; INTRAVENOUS at 14:30

## 2019-10-07 RX ADMIN — Medication 10 ML: at 16:36

## 2019-10-07 RX ADMIN — GLYCOPYRROLATE 0.1 MG: 0.2 INJECTION, SOLUTION INTRAMUSCULAR; INTRAVENOUS at 09:05

## 2019-10-07 RX ADMIN — ONDANSETRON HYDROCHLORIDE 4 MG: 2 INJECTION, SOLUTION INTRAMUSCULAR; INTRAVENOUS at 10:18

## 2019-10-07 RX ADMIN — ACETAMINOPHEN 650 MG: 325 TABLET, FILM COATED ORAL at 18:53

## 2019-10-07 RX ADMIN — FENTANYL CITRATE 100 MCG: 50 INJECTION, SOLUTION INTRAMUSCULAR; INTRAVENOUS at 07:31

## 2019-10-07 RX ADMIN — SODIUM CHLORIDE, POTASSIUM CHLORIDE, SODIUM LACTATE AND CALCIUM CHLORIDE: 600; 310; 30; 20 INJECTION, SOLUTION INTRAVENOUS at 10:03

## 2019-10-07 RX ADMIN — HYDROMORPHONE HYDROCHLORIDE 0.4 MG: 2 INJECTION, SOLUTION INTRAMUSCULAR; INTRAVENOUS; SUBCUTANEOUS at 09:28

## 2019-10-07 RX ADMIN — FENTANYL CITRATE 25 MCG: 50 INJECTION INTRAMUSCULAR; INTRAVENOUS at 12:05

## 2019-10-07 RX ADMIN — LABETALOL HYDROCHLORIDE 10 MG: 5 INJECTION INTRAVENOUS at 11:28

## 2019-10-07 RX ADMIN — PROPOFOL 200 MG: 10 INJECTION, EMULSION INTRAVENOUS at 07:34

## 2019-10-07 RX ADMIN — FENTANYL CITRATE 25 MCG: 50 INJECTION INTRAMUSCULAR; INTRAVENOUS at 12:15

## 2019-10-07 RX ADMIN — HYDROMORPHONE HYDROCHLORIDE 0.4 MG: 2 INJECTION, SOLUTION INTRAMUSCULAR; INTRAVENOUS; SUBCUTANEOUS at 10:12

## 2019-10-07 RX ADMIN — Medication 5 MG: at 09:14

## 2019-10-07 RX ADMIN — LABETALOL HYDROCHLORIDE 10 MG: 5 INJECTION, SOLUTION INTRAVENOUS at 11:28

## 2019-10-07 RX ADMIN — ROCURONIUM BROMIDE 5 MG: 10 SOLUTION INTRAVENOUS at 09:47

## 2019-10-07 RX ADMIN — ACETAMINOPHEN 650 MG: 325 TABLET, FILM COATED ORAL at 07:14

## 2019-10-07 RX ADMIN — Medication 2 G: at 16:35

## 2019-10-07 RX ADMIN — SODIUM CHLORIDE, POTASSIUM CHLORIDE, SODIUM LACTATE AND CALCIUM CHLORIDE: 600; 310; 30; 20 INJECTION, SOLUTION INTRAVENOUS at 07:20

## 2019-10-07 RX ADMIN — ROCURONIUM BROMIDE 20 MG: 10 SOLUTION INTRAVENOUS at 08:07

## 2019-10-07 RX ADMIN — FENTANYL CITRATE 25 MCG: 50 INJECTION INTRAMUSCULAR; INTRAVENOUS at 12:30

## 2019-10-07 RX ADMIN — Medication 5 MG: at 09:11

## 2019-10-07 RX ADMIN — DEXAMETHASONE SODIUM PHOSPHATE 4 MG: 4 INJECTION, SOLUTION INTRAMUSCULAR; INTRAVENOUS at 08:45

## 2019-10-07 RX ADMIN — Medication 2 G: at 07:45

## 2019-10-07 RX ADMIN — OXYCODONE HYDROCHLORIDE 5 MG: 5 TABLET ORAL at 16:41

## 2019-10-07 RX ADMIN — MIDAZOLAM 2 MG: 1 INJECTION INTRAMUSCULAR; INTRAVENOUS at 07:29

## 2019-10-07 RX ADMIN — ROCURONIUM BROMIDE 40 MG: 10 SOLUTION INTRAVENOUS at 07:36

## 2019-10-07 RX ADMIN — LIDOCAINE HYDROCHLORIDE 100 MG: 20 INJECTION, SOLUTION EPIDURAL; INFILTRATION; INTRACAUDAL; PERINEURAL at 07:31

## 2019-10-07 RX ADMIN — ROCURONIUM BROMIDE 5 MG: 10 SOLUTION INTRAVENOUS at 08:48

## 2019-10-07 RX ADMIN — ROCURONIUM BROMIDE 5 MG: 10 SOLUTION INTRAVENOUS at 09:27

## 2019-10-07 RX ADMIN — LABETALOL HYDROCHLORIDE 10 MG: 5 INJECTION, SOLUTION INTRAVENOUS at 12:01

## 2019-10-07 RX ADMIN — HYDROCHLOROTHIAZIDE: 25 TABLET ORAL at 16:35

## 2019-10-07 RX ADMIN — ROCURONIUM BROMIDE 10 MG: 10 SOLUTION INTRAVENOUS at 09:16

## 2019-10-07 RX ADMIN — ROCURONIUM BROMIDE 5 MG: 10 SOLUTION INTRAVENOUS at 10:13

## 2019-10-07 RX ADMIN — SENNOSIDES, DOCUSATE SODIUM 1 TABLET: 50; 8.6 TABLET, FILM COATED ORAL at 18:53

## 2019-10-07 NOTE — ANESTHESIA POSTPROCEDURE EVALUATION
Post-Anesthesia Evaluation and Assessment    Patient: Milan Almonte MRN: [id number]  SSN: [id number]    YOB: 1960  Age: 59 y.o. Sex: male      I have evaluated the patient and they are stable and ready for discharge from the PACU. Cardiovascular Function/Vital Signs  Visit Vitals  /88   Pulse 65   Temp 36.9 °C (98.4 °F)   Resp 20   Ht 6' 1\" (1.854 m)   Wt 94.3 kg (208 lb)   SpO2 100%   BMI 27.44 kg/m²       Patient is status post General anesthesia for Procedure(s):  C4-5 C5-6 C6-7 ANTERIOR CERVICAL DISCECTOMY AND FUSION WITH DEPUY PEAK, ALLOGRAFT, SKYLINE PLATE. Nausea/Vomiting: None    Postoperative hydration reviewed and adequate. Pain:  Pain Scale 1: Numeric (0 - 10) (10/07/19 1215)  Pain Intensity 1: 4 (10/07/19 1215)   Managed    Neurological Status:   Neuro (WDL): Within Defined Limits (10/07/19 1215)  Neuro  Neurologic State: Alert (10/07/19 1215)  LUE Motor Response: Purposeful (10/07/19 1215)  LLE Motor Response: Purposeful (10/07/19 1215)  RUE Motor Response:  unequal L greater than R;Purposeful (10/07/19 1215)  RLE Motor Response: Purposeful (10/07/19 1215)   At baseline    Mental Status, Level of Consciousness: Alert and  oriented to person, place, and time    Pulmonary Status:   O2 Device: CO2 nasal cannula (10/07/19 1056)   Adequate oxygenation and airway patent    Complications related to anesthesia: None    Post-anesthesia assessment completed. No concerns    Signed By: Marcelina Francois MD     October 7, 2019              Procedure(s):  C4-5 C5-6 C6-7 ANTERIOR CERVICAL DISCECTOMY AND FUSION WITH DEPUY PEAK, ALLOGRAFT, SKYLINE PLATE. general    <BSHSIANPOST>    Vitals Value Taken Time   /95 10/7/2019 12:30 PM   Temp 36.9 °C (98.4 °F) 10/7/2019 10:56 AM   Pulse 66 10/7/2019 12:40 PM   Resp 18 10/7/2019 12:40 PM   SpO2 100 % 10/7/2019 12:40 PM   Vitals shown include unvalidated device data.

## 2019-10-07 NOTE — PERIOP NOTES
TRANSFER - OUT REPORT:    Verbal report given to [address] RN(name) on [employer]  being transferred to [facility](unit) for routine post - op       Report consisted of patients Situation, Background, Assessment and   Recommendations(SBAR). Time Pre op antibiotic given:Y  Anesthesia Stop time: Y  Patel Present on Transfer to floor:N  Order for Patel on Chart:N  Discharge Prescriptions with Chart:N    Information from the following report(s) SBAR was reviewed with the receiving nurse. Opportunity for questions and clarification was provided. Is the patient on 02? NO       L/Min        Other     Is the patient on a monitor? NO    Is the nurse transporting with the patient? NO    Surgical Waiting Area notified of patient's transfer from PACU?  NO      The following personal items collected during your admission accompanied patient upon transfer:   Dental Appliance: Dental Appliances: None  Vision: Visual Aid: Glasses  Hearing Aid:    Jewelry:    Clothing:    Other Valuables:    Valuables sent to safe:

## 2019-10-07 NOTE — PROGRESS NOTES
10/07/19 1330   Vital Signs   Pulse (Heart Rate) 71   Resp Rate 23   BP (!) 178/94   Oxygen Therapy   O2 Sat (%) 100 %   Pulse via Oximetry 70 beats per minute     Pt bp has increased to 170s/90s again. Dr Amada Rincon notified. Hydralazine ordered and given. See MAR and flow sheet for f/u.

## 2019-10-07 NOTE — PROGRESS NOTES
Problem: Simple Spine Procedure:  Day of Surgery  Goal: Nutrition/Diet  Outcome: Progressing Towards Goal

## 2019-10-07 NOTE — PERIOP NOTES
Patient: Douglas Shah MRN: [id number]  SSN: [id number]   YOB: 1960  Age: 59 y.o. Sex: male     Patient is status post Procedure(s):  C4-5 C5-6 C6-7 ANTERIOR CERVICAL DISCECTOMY AND FUSION WITH DEPUY PEAK, ALLOGRAFT, SKYLINE PLATE. Surgeon(s) and Role:     Audelia Boucher MD - Primary                      Peripheral IV 10/07/19 Left Hand (Active)          Hemovac Anterior Neck (Active)                     Dressing/Packing:  Wound Neck-Dressing Type: Topical skin adhesive/glue; Other (Comment)([place] dressing ) (10/07/19 1038)    Splint/Cast: Wound Neck-Splint Type/Material: Cervical Collar(Hard)]

## 2019-10-07 NOTE — PROGRESS NOTES
10/07/19 1115   Vital Signs   Pulse (Heart Rate) 72   Resp Rate 23   /85   Oxygen Therapy   O2 Sat (%) 100 %   Pulse via Oximetry 72 beats per minute     Dr Dayron Nava notified pt bp 170/80s-90s. Pt pain free at this time and resting. Labetalol ordered. See MARl and flow sheet for f/u.

## 2019-10-07 NOTE — PROGRESS NOTES
Primary Nurse Toña Alvarado RN and Jenelle RN performed a dual skin assessment on this patient No impairment noted  Reed score is 20

## 2019-10-07 NOTE — PROGRESS NOTES
TRANSFER - IN REPORT:    Verbal report received from [address] (name) on Vivien Rangel  being received from PACU (unit) for routine post - op      Report consisted of patients Situation, Background, Assessment and   Recommendations(SBAR). Information from the following report(s) SBAR, OR Summary, Procedure Summary and Intake/Output was reviewed with the receiving nurse. Opportunity for questions and clarification was provided. Assessment completed upon patients arrival to unit and care assumed.

## 2019-10-07 NOTE — BRIEF OP NOTE
BRIEF OPERATIVE NOTE    Date of Procedure: 10/7/2019   Preoperative Diagnosis: CERVICAL STENOSIS WITH MYLEOPATHY  Postoperative Diagnosis: CERVICAL STENOSIS WITH MYLEOPATHY    Procedure(s):  C4-5 C5-6 C6-7 ANTERIOR CERVICAL DISCECTOMY AND FUSION WITH DEPUY PEAK, ALLOGRAFT, SKYLINE PLATE  Surgeon(s) and Role:     * Sindhu Hastings MD - Primary         Surgical Assistant: Tavo Butler    Surgical Staff:  Circ-Regina Carolina RN  Circ-Relief: Isis Iniguez RN  Scrub RN-1: Lopez Oreilly RN  Surg Asst-1: Carlos Doctor Staff: Darlene Beard  Event Time In Time Out   Incision Start 6701    Incision Close       Anesthesia: General   Estimated Blood Loss: 50  Specimens: * No specimens in log *   Findings: stenosis   Complications: no  Implants:   Implant Name Type Inv.  Item Serial No.  Lot No. LRB No. Used Action   GRAFT BNE INJCT MOLD 2.5 CC -- PRIME HD DBM - [id number]  GRAFT BNE INJCT MOLD 2.5 CC -- PRIME HD DBM [id number] MUSCULOSKELETAL TRANS N/A N/A 1 Implanted   CAGE CERV LORDTC [id number] -- STRL ACIS - SN/A  CAGE CERV LORDTC [id number] -- STRL ACIS N/A [facility] DEPU[employer] SPINE [id number]  N/A 1 Implanted   CAGE CERV LORDTC [id number] -- STRL ACIS - SN/A  CAGE CERV LORDTC [id number] -- STRL ACIS N/A [facility] [employer]INE [id number] N/A 1 Implanted   CAGE CERV LORDTC [id number] -- STRL ACIS - SN/A  CAGE CERV LORDTC [id number] -- STRL ACIS N/A [facility] [employer]INE [id number] N/A 1 Implanted   PLATE CERV ANTR 3 LVL 51MM TI -- SKYLINE - SN/A  PLATE CERV ANTR 3 LVL 51MM TI -- SKYLINE N/A [employer] DE[employer] SPINE N/A N/A 1 Implanted   SCR CERV ANTR VA SD 16MM TI -- SKYLINE - SN/A  SCR CERV ANTR VA SD 16MM TI -- SKYLINE N/A [facility] DEPUY SPINE N/A N/A 6 Implanted   SCR CERV ANTR FA SD 16MM TI -- SKYLINE - SN/A  SCR CERV ANTR FA SD 16MM TI -- SKYLINE N/A [employer] DEPUY SPINE N/A N/A 2 Implanted

## 2019-10-07 NOTE — ANESTHESIA PREPROCEDURE EVALUATION
Relevant Problems   No relevant active problems       Anesthetic History   No history of anesthetic complications            Review of Systems / Medical History  Patient summary reviewed, nursing notes reviewed and pertinent labs reviewed    Pulmonary  Within defined limits                 Neuro/Psych   Within defined limits           Cardiovascular    Hypertension: well controlled                   GI/Hepatic/Renal  Within defined limits              Endo/Other  Within defined limits           Other Findings              Physical Exam    Airway  Mallampati: II  TM Distance: > 6 cm  Neck ROM: normal range of motion   Mouth opening: Normal     Cardiovascular  Regular rate and rhythm,  S1 and S2 normal,  no murmur, click, rub, or gallop             Dental  No notable dental hx       Pulmonary  Breath sounds clear to auscultation               Abdominal  GI exam deferred       Other Findings            Anesthetic Plan    ASA: 2  Anesthesia type: general          Induction: Intravenous  Anesthetic plan and risks discussed with: Patient

## 2019-10-08 VITALS
BODY MASS INDEX: 27.57 KG/M2 | TEMPERATURE: 98.2 F | SYSTOLIC BLOOD PRESSURE: 138 MMHG | RESPIRATION RATE: 16 BRPM | HEIGHT: 73 IN | HEART RATE: 72 BPM | DIASTOLIC BLOOD PRESSURE: 82 MMHG | OXYGEN SATURATION: 98 % | WEIGHT: 208 LBS

## 2019-10-08 PROCEDURE — 97116 GAIT TRAINING THERAPY: CPT

## 2019-10-08 PROCEDURE — 97535 SELF CARE MNGMENT TRAINING: CPT

## 2019-10-08 PROCEDURE — 51798 US URINE CAPACITY MEASURE: CPT

## 2019-10-08 PROCEDURE — 99218 HC RM OBSERVATION: CPT

## 2019-10-08 PROCEDURE — 74011250637 HC RX REV CODE- 250/637: Performed by: NEUROLOGICAL SURGERY

## 2019-10-08 PROCEDURE — 97161 PT EVAL LOW COMPLEX 20 MIN: CPT

## 2019-10-08 PROCEDURE — 74011250636 HC RX REV CODE- 250/636: Performed by: NEUROLOGICAL SURGERY

## 2019-10-08 PROCEDURE — 97165 OT EVAL LOW COMPLEX 30 MIN: CPT

## 2019-10-08 RX ORDER — OXYCODONE HYDROCHLORIDE 5 MG/1
5 TABLET ORAL
Qty: 20 TAB | Refills: 0 | Status: SHIPPED | OUTPATIENT
Start: 2019-10-08 | End: 2019-10-14

## 2019-10-08 RX ADMIN — HYDROCHLOROTHIAZIDE: 25 TABLET ORAL at 08:38

## 2019-10-08 RX ADMIN — TAMSULOSIN HYDROCHLORIDE 0.4 MG: 0.4 CAPSULE ORAL at 08:38

## 2019-10-08 RX ADMIN — Medication 10 ML: at 06:38

## 2019-10-08 RX ADMIN — ACETAMINOPHEN 650 MG: 325 TABLET, FILM COATED ORAL at 00:16

## 2019-10-08 RX ADMIN — ACETAMINOPHEN 650 MG: 325 TABLET, FILM COATED ORAL at 06:38

## 2019-10-08 RX ADMIN — SENNOSIDES, DOCUSATE SODIUM 1 TABLET: 50; 8.6 TABLET, FILM COATED ORAL at 08:39

## 2019-10-08 RX ADMIN — Medication 2 G: at 00:16

## 2019-10-08 RX ADMIN — MELATONIN 1 TABLET: at 08:38

## 2019-10-08 NOTE — PROGRESS NOTES
PHYSICAL THERAPY EVALUATION  Patient: Reyna Stafford (43 y.o. male)  Date: 10/8/2019  Primary Diagnosis: Cervical stenosis of spinal canal [M48.02]  Procedure(s) (LRB):  C4-5 C5-6 C6-7 ANTERIOR CERVICAL DISCECTOMY AND FUSION WITH DEPUY PEAK, ALLOGRAFT, SKYLINE PLATE (N/A) 1 Day Post-Op   Precautions: No bending, no lifting greater than 5 lbs, no twisting, log-roll technique, repositioning every 20-30 min except when sleeping, brace when OOB           ASSESSMENT  Based on the objective data described below, the patient presents with impaired trunk ROM, spinal precautions, mild pain, balance, and weakness and gait dysfunction/intolerance causing limited independence with mobility s/p recent C4-C7 ACDF POD #1. Pt received in bed, min assist to adjust brace, STS without assist, ambulates >500ft and performs stairs x12 B rails. Pt tolerates well without much difficulty after initial cautious step length, decreased toe clearance and increased nervousness that resolves with time and instruction. Reviewed back precautions, positioning in bed, and progress. Pt is cleared for discharge from Physical Therapy standpoint at this time, do not recommend HHPT. Current Level of Function Impacting Discharge (mobility/balance): none    Functional Outcome Measure: The patient scored Total: 85/100 on the Barthel Index which is indicative of low impaired ability to care for basic self needs/dependency on others.           PLAN :  Recommendations and Planned Interventions: none required at this time      Recommendation for discharge: (in order for the patient to meet his/her long term goals)  Outpatient physical therapy follow up recommended for cervical retraining if recommended by physician after healing    This discharge recommendation:  Has been made in collaboration with the attending provider and/or case management    Equipment recommendations for successful discharge (if) home: brace/splint         SUBJECTIVE:   Patient stated [facility] cant wait to go home.  \"I feel like this brace does not quite fit me\" adjusted height of brace as 1-2 inch space noted raising chin 1/2 in with space remaining for support    OBJECTIVE DATA SUMMARY:   HISTORY:    Past Medical History:   Diagnosis Date    Bilateral pulmonary embolism ([facility] 5/22/2018    BPH (benign prostatic hyperplasia)     DVT, bilateral lower limbs ([facility] 5/22/2018    Gout     HTN (hypertension)     Hypertension     Neutropenia ([facility]     Prediabetes     Thromboembolus ([facility]     2009 & 2012    Thromboembolus [facility])     2009 & 2012. 2014 elevated D dimer. Past Surgical History:   Procedure Laterality Date    HX COLONOSCOPY  2012    HX COLONOSCOPY  2014    HX HEENT  2015    ORAL SURGERY X 3    HX HEENT  04/2015    SINUS SURGERY    HX KNEE ARTHROSCOPY Left        Personal factors and/or comorbidities impacting plan of care:     Home Situation  Home Environment: Private residence  # Steps to Enter: 1  Rails to Enter: No  Wheelchair Ramp: No  One/Two Story Residence: Two story  # of Interior Steps: 9  Interior Rails: Right  Living Alone: No  Support Systems: Family member(s)  Patient Expects to be Discharged to[de-identified] Private residence  Current DME Used/Available at Home: None  Tub or Shower Type: Tub/Shower combination    EXAMINATION/PRESENTATION/DECISION MAKING:   Critical Behavior:  Neurologic State: Alert           Hearing: Auditory  Auditory Impairment: None  Skin:  intact  Edema: none  Range Of Motion:  AROM: Generally decreased, functional(cervical collar)           PROM: Generally decreased, functional           Strength:    Strength:  Within functional limits                    Tone & Sensation:   Tone: Normal              Sensation: Impaired(r thumb)               Coordination:  Coordination: Generally decreased, functional  Vision:   Acuity: Within Defined Limits  Corrective Lenses: Glasses  Functional Mobility:  Bed Mobility:  Rolling: Supervision;Modified independent  Supine to Sit: Supervision;Modified independent  Sit to Supine: Supervision;Modified independent  Scooting: Modified independent;Supervision  Transfers:  Sit to Stand: Modified independent;Supervision  Stand to Sit: Modified independent;Supervision  Stand Pivot Transfers: Modified independent;Supervision                    Balance:   Sitting: Intact  Standing: Intact  Ambulation/Gait Training:  Distance (ft): 500 Feet (ft)  Assistive Device: Gait belt;Brace/Splint  Ambulation - Level of Assistance: Supervision(S for safety capable of independence)     Gait Description (WDL): Exceptions to WDL  Gait Abnormalities: Other(mild shuffling and decreased toe clearance initially)        Base of Support: Other (comment)(normalized )     Speed/Marcus: Fluctuations(slow and cautious, then resumed normal marcus)  Step Length: Other (comment)(WFL)      Stairs:  Number of Stairs Trained: 12  Stairs - Level of Assistance: Supervision;Contact guard assistance   Rail Use: Both    Functional Measure:  Barthel Index:    Bathin  Bladder: 10  Bowels: 10  Groomin  Dressin  Feeding: 10  Mobility: 15  Stairs: 5  Toilet Use: 10  Transfer (Bed to Chair and Back): 15  Total: 85/100       The Barthel ADL Index: Guidelines  1. The index should be used as a record of what a patient does, not as a record of what a patient could do. 2. The main aim is to establish degree of independence from any help, physical or verbal, however minor and for whatever reason. 3. The need for supervision renders the patient not independent. 4. A patient's performance should be established using the best available evidence. Asking the patient, friends/relatives and nurses are the usual sources, but direct observation and common sense are also important. However direct testing is not needed. 5. Usually the patient's performance over the preceding 24-48 hours is important, but occasionally longer periods will be relevant.   6. Middle categories imply that the patient supplies over 50 per cent of the effort. 7. Use of aids to be independent is allowed. Lazaro Edwards., Barthel, D.W. (0475). Functional evaluation: the Barthel Index. [facility] (14)2. KIMMY Burgos Williams Jetty., Rheta People., [place], [address] Measuring the change indisability after inpatient rehabilitation; comparison of the responsiveness of the Barthel Index and Functional [place] Measure. Journal of Neurology, Neurosurgery, and Psychiatry, 66(4), 253-682. Xander Durham, N.J.A, IVELISSE Kan, & Francisco Javier De Jesus M.A. (2004.) Assessment of post-stroke quality of life in cost-effectiveness studies: The usefulness of the Barthel Index and the EuroQoL-5D. Quality of Life Research, 15, 461-07        Physical Therapy Evaluation Charge Determination   History Examination Presentation Decision-Making   HIGH Complexity :3+ comorbidities / personal factors will impact the outcome/ POC  MEDIUM Complexity : 3 Standardized tests and measures addressing body structure, function, activity limitation and / or participation in recreation  LOW Complexity : Stable, uncomplicated  Other outcome measures barthel  LOW       Based on the above components, the patient evaluation is determined to be of the following complexity level: LOW     Pain Ratin/10, controlled    Activity Tolerance:   Good  Please refer to the flowsheet for vital signs taken during this treatment. After treatment patient left in no apparent distress:   Sitting in chair, Call bell within reach and Caregiver / family present    COMMUNICATION/EDUCATION:   The patients plan of care was discussed with: Registered Nurse and . Fall prevention education was provided and the patient/caregiver indicated understanding. and Patient/family have participated as able in goal setting and plan of care.     Thank you for this referral.  Carmelo Fall, PT   Time Calculation: 35 mins

## 2019-10-08 NOTE — PROGRESS NOTES
Reviewed discharge instructions and allowed time for questions. Reviewed medications and last time administered. PIV discontinued. Drain removed. Patient discharged via wheelchair assistance.      Coral Early

## 2019-10-08 NOTE — OP NOTES
[address]   OPERATIVE REPORT    Name:  Yuli Madera  MR#:  [id number]  :  1960  ACCOUNT #:  [de-identified]  DATE OF SERVICE:  10/07/2019      PREOPERATIVE DIAGNOSIS:  Cervical stenosis with myelopathy, C4 to C7. POSTOPERATIVE DIAGNOSIS:  Cervical stenosis with myelopathy, C4 to C7. PROCEDURES PERFORMED:  C4-5, C5-6, C6-7 anterior cervical diskectomy with instrumented fusion, C4 to C7 using DePuy PEEK structural allograft spacers and DePuy stand-alone anterior skyline plate, use of operating microscope. SURGEON:  David Cox MD    ASSISTANT:  Velia Alba. ANESTHESIA:  General endotracheal anesthesia. COMPLICATIONS:  None. SPECIMENS REMOVED:  None. IMPLANTS:  As noted above. ESTIMATED BLOOD LOSS:  50 mL. OPERATIVE INDICATIONS:  A 59year-old gentleman with myelopathy, loss of function in his right arm and hand, numbness in both hands and feet. Workup revealed congenitally narrow spinal canal with stenosis at multiple levels, especially C4-5 where there is signal change in the spinal cord and tight stenosis, C4-5, C5-6, and C6-7 as well. After discussing treatment options and risks of surgery with the patient and informed consent was obtained. OPERATION IN DETAIL:  The patient was taken to the operating room and placed under general endotracheal anesthesia. All necessary lines and monitors were placed. Given appropriate dose of IV antibiotics. Patel was placed. All pressure points were padded. He was placed supine on the operating room table. Arms tucked by the side. Neck in gentle extension. The anterior cervical region was prepped and draped in a standard sterile fashion. A midline incision was made from the midline to the left sternocleidomastoid with a skin knife, carried down with Bovie electrocautery through the subcutaneous tissue. Platysma was undermined rostrally and caudally.   The plane of medial sternocleidomastoid was identified via sharp and blunt dissection. This was followed down to the prevertebral space. The trachea and esophagus were retracted medially, carotid sheath laterally. The prevertebral space was cleaned off. Localizing x-ray was obtained with fluoroscopy. The anterior vertebral bodies of C4, C5, C6, and C7 were cleaned off soft tissue and the longus colli were undermined bilaterally. Large anterior osteophytes were removed. Dissection began at the C4-5 level where a self-retaining retractor was placed. I then placed distraction pins at this level. Annulotomy was performed. The operating microscope was brought into field. Under the microscope, a complete diskectomy of C4-5 was performed, widely decompressing the thecal sac. There was significant posterior osteophyte at C4 and some central disk protrusion compressing the cord. The posterior longitudinal ligament was opened and the spinal sac was widely decompressed as well as the bilateral neuroforamen. The endplates were decorticated. A 7 mm PEEK spacer packed with demineralized bone matrix was then placed in the interspace and countersunk. The distraction pins and retractors were then adjusted to C5-6 in a similar fashion, a complete diskectomy was performed under the microscope at this level, widely decompressing the spinal cord, removing the entire disk, posterior longitudinal ligament, and osteophytes and widely decompressing the spinal sac and the foramen. The endplates were decorticated and another 7 mm graft packed with demineralized bone matrix was placed and countersunk. The process was then repeated for C6-7 under the microscope. Again, the diskectomy was performed and the PLL was opened. There was significant osteophytosis more on the right. This was widely decompressed the spinal sac as well as the foramen and the neuroforamen. The endplates were decorticated. A 7 mm spacer packed with demineralized bone matrix was again placed and countersunk. The scope was taken out of field. Distraction pins were removed. Retractors were adjusted. A DePuy skyline 51 mm three-level plate was then placed on the anterior spinal and fluoroscopy was performed. I then placed 16 x 4 mm fixed screws bilaterally at C7 and 16 x 4 mm variable screws bilaterally at C4, C5, and C6 with good purchase of the screws. Locking mechanisms were engaged. Fluoroscopy showed good position of the graft and the screws on AP and lateral fluoroscopy. The wound was copiously irrigated with antibiotic solution. Hemostasis was achieved. A Hemovac drain was placed, brought out through a separate stab incision. The retractors were removed. The wound was then closed using 2-0 Vicryl to close the platysma and running 4-0 Monocryl in a subcuticular fashion. The wounds were cleaned, dried, and dressed with sterile dressing. The patient was then placed in a collar, extubated, taken to recovery room in stable condition.       MD YAMEL [email or web address]  D:  10/07/2019 16:18  T:  10/08/2019 1:27  JOB #:  [id number]  CC:  MD David Haji MD

## 2019-10-08 NOTE — PROGRESS NOTES
OCCUPATIONAL THERAPY EVALUATION/DISCHARGE  Patient: Geovanna Montalvo (22 y.o. male)  Date: 10/8/2019  Primary Diagnosis: Cervical stenosis of spinal canal [M48.02]  Procedure(s) (LRB):  C4-5 C5-6 C6-7 ANTERIOR CERVICAL DISCECTOMY AND FUSION WITH DEPUY PEAK, ALLOGRAFT, SKYLINE PLATE (N/A) 1 Day Post-Op   Precautions:cervical       ASSESSMENT  Based on the objective data described below, the patient presents with decreased mobility/ADL due to surgery. Reviewed cervical precautions, ADL, mobility, c collar management. Wife present and capable of providing assistance. Current Level of Function (ADLs/self-care): may need up to Min A due to help with c collar, can do LB ADLS, Min A to get up from flat bed, supervision with head elevated    Functional Outcome Measure: The patient scored 85/100 on the Barthel Index outcome measure which is indicative of 15% deficit with ADLS/mobility. .      Other factors to consider for discharge: none     PLAN :  Recommendation for discharge: (in order for the patient to meet his/her long term goals)  No skilled occupational therapy/ follow up rehabilitation needs identified at this time. This discharge recommendation:  A follow-up discussion with the attending provider and/or case management is planned    Equipment recommendations for successful discharge: none       SUBJECTIVE:   Patient stated I know I can't drive.     OBJECTIVE DATA SUMMARY:   HISTORY:   Past Medical History:   Diagnosis Date    Bilateral pulmonary embolism ([address] 5/22/2018    BPH (benign prostatic hyperplasia)     DVT, bilateral lower limbs ([address] 5/22/2018    Gout     HTN (hypertension)     Hypertension     Neutropenia ([address]     Prediabetes     [facility] [facility]     2009 & 2012    Thromboembolus [facility])     2009 & 2012. 2014 elevated D dimer.       Past Surgical History:   Procedure Laterality Date    HX COLONOSCOPY  2012    HX COLONOSCOPY  2014    HX HEENT  2015    ORAL SURGERY X 3    HX HEENT  04/2015 SINUS SURGERY    HX KNEE ARTHROSCOPY Left        Prior Level of Function/Environment/Context: independent  Expanded or extensive additional review of patient history:     Home Situation  Home Environment: (P) Private residence  # Steps to Enter: (P) 0  One/Two Story Residence: (P) Two story  Living Alone: No  Support Systems: Spouse/Significant Other/Partner  Patient Expects to be Discharged to[de-identified] Private residence  Current DME Used/Available at Home: None    Hand dominance: Right    EXAMINATION OF PERFORMANCE DEFICITS:  Cognitive/Behavioral Status:         calm and cooperative             Skin: cervical incision    Edema: none    Hearing: Auditory  Auditory Impairment: None    Vision/Perceptual:                           Acuity: Within Defined Limits    Corrective Lenses: Glasses    Range of Motion:    AROM: Generally decreased, functional  PROM: Generally decreased, functional                      Strength:    Strength: Generally decreased, functional                Coordination:  Coordination: Generally decreased, functional  Fine Motor Skills-Upper: Right Impaired;Left Intact    Gross Motor Skills-Upper: Right Intact; Left Intact    Tone & Sensation:    Tone: Normal  Sensation: Impaired(R thumb decreased)                      Balance:  Sitting: Intact  Standing: Intact    Functional Mobility and Transfers for ADLs:  Bed Mobility:  Rolling: Supervision  Supine to Sit: Minimum assistance(from flat surface, spouse will assist)  Sit to Supine: Supervision  Scooting: Supervision    Transfers:  Stand to Sit: Supervision  Bathroom Mobility: Supervision/set up  Toilet Transfer : Supervision    ADL Assessment:  Feeding: Independent    Oral Facial Hygiene/Grooming: Independent    Bathing: Supervision    Upper Body Dressing: Minimum assistance(for brace, spouse assists)    Lower Body Dressing: Stand-by assistance                     ADL Intervention and task modifications:  Patient instructed and demonstrated 3/3 cervical spine precautions with verbal cues. Patient instructed and indicated understanding the benefits of maintaining activity tolerance, functional mobility, and independence with self care tasks during acute stay  to ensure safe return home and to baseline. Encouraged patient to increase frequency and duration OOB, not sitting longer than 30 mins without marching/walking with staff, be out of bed for all meals, perform daily ADLs (as approved by RN/MD regarding bathing etc), and performing functional mobility to/from bathroom. Patient instruction and indicated understanding on body mechanics, ergonomics and gravitational force on the spine during different body positions to plan activities in prep for return home to complete basic ADLs, instrumental ADLs and back to work safely. Bathing: Patient instructed and indicated understanding when bathing to not submerge wound in water, stand to shower or sponge bathe, cover wound with plastic and tape to ensure no water reaches bandage/wound without cues. Dressing brace: Patient instructed and demonstrated while in front of mirror to don/doff velcro on brace using dominant side, keeping non-dominant side intact. Instruction and indicated understanding in removal of fabric pieces, placement of clean pieces, don brace, then can hand wash and allow air dry. Dressing lower body: Patient instructed to don brace first and on the benefits to remain seated to don all clothing to increase independence with precautions and pain management. Patient instructed and demonstrated tailor sitting for lower body dressing with supervision. Toileting: Patient instructed on the benefits of using flushable wet wipes and toilet tongs if decreased reach or pain for ashok care. Also, the benefits of a reacher to aid in clothing management.    Home safety: Patient instructed and indicated understanding on home modifications and safety [raise height of ADL objects (i.e. clothing, sink items, fridge items, items to mouth when grooming), change of floor surfaces, clear pathways] to increase independence and fall prevention. Standing: Patient instructed and indicated understanding to walk up to sink/counter top/surfaces, step into walker, square off while using objects, slide objects along surfaces, to increase adherence to back precautions and fall prevention. Tub transfer: Patient instructed and indicated understanding regarding when it is safe to begin transfer into tub (complete stairs with PT, advance exercises with PT high enough to clear tub height, and while clothes donned practice with another person present). Functional Measure:  Barthel Index:    Bathin  Bladder: 10  Bowels: 10  Groomin  Dressin  Feeding: 10  Mobility: 15  Stairs: 5  Toilet Use: 10  Transfer (Bed to Chair and Back): 15  Total: 85/100        The Barthel ADL Index: Guidelines  1. The index should be used as a record of what a patient does, not as a record of what a patient could do. 2. The main aim is to establish degree of independence from any help, physical or verbal, however minor and for whatever reason. 3. The need for supervision renders the patient not independent. 4. A patient's performance should be established using the best available evidence. Asking the patient, friends/relatives and nurses are the usual sources, but direct observation and common sense are also important. However direct testing is not needed. 5. Usually the patient's performance over the preceding 24-48 hours is important, but occasionally longer periods will be relevant. 6. Middle categories imply that the patient supplies over 50 per cent of the effort. 7. Use of aids to be independent is allowed. Mickie Puri., Barthel, D.W. ([id number]). Functional evaluation: the Barthel Index. [address] [facility] (14)2. NAOMI QuintanaF, Nereyda Romo., Ella Prim., [place], [facility] [facility] ().  Measuring the change indisability after inpatient rehabilitation; comparison of the responsiveness of the Barthel Index and Functional [place] Measure. Journal of Neurology, Neurosurgery, and Psychiatry, 66(4), 517-334. KRISTIAN Escalera.GREGORY, IVELISSE Kan, & Mina Roper M.A. (2004.) Assessment of post-stroke quality of life in cost-effectiveness studies: The usefulness of the Barthel Index and the EuroQoL-5D. Quality of Life Research, 15, 711-91         Occupational Therapy Evaluation Charge Determination   History Examination Decision-Making   LOW Complexity : Brief history review  LOW Complexity : 1-3 performance deficits relating to physical, cognitive , or psychosocial skils that result in activity limitations and / or participation restrictions  LOW Complexity : No comorbidities that affect functional and no verbal or physical assistance needed to complete eval tasks       Based on the above components, the patient evaluation is determined to be of the following complexity level: LOW   Pain Rating:  Reports pain manageable    Activity Tolerance:   Good  Please refer to the flowsheet for vital signs taken during this treatment. After treatment patient left in no apparent distress:    seated EOB    COMMUNICATION/EDUCATION:   The patients plan of care was discussed with: Physical Therapist and Registered Nurse. Home safety education was provided and the patient/caregiver indicated understanding., Patient/family have participated as able in goal setting and plan of care. and Patient/family agree to work toward stated goals and plan of care. This patients plan of care is appropriate for delegation to [facility].     Thank you for this referral.  Hendrix Come  Time Calculation: 30 mins

## 2019-10-08 NOTE — DISCHARGE INSTRUCTIONS
After Hospital Care Plan:  Discharge Instructions Cervical (Neck) Spine Surgery Dr. Kenny Dumas     Patient Name: Tracy Munoz    Date of procedure: 10/7/2019      Date of discharge: 10/8/2019    Procedure: Procedure(s):  C4-5 C5-6 C6-7 ANTERIOR CERVICAL DISCECTOMY AND FUSION WITH DEPUY PEAK, ALLOGRAFT, SKYLINE PLATE      PCP: Jane Larson MD    Follow up appointments  -Follow up with Dr. Kenny Dumas in 2 weeks. Call [phone] to make an appointment as soon as you get home from the hospital.    When to call your Orthopaedic Surgeon:  -Difficulty swallowing that is worse than when you left the hospital.  -Signs of infection-if your incision is red; continues to have drainage; drainage has a foul odor or if you have a persistent fever over 101 degrees for 24 hours  -Nausea or vomiting, severe headache  -Changes in sensation in your arms or legs (numbness, tingling, loss of color)  -Increased weakness-greater than before your surgery  -Severe pain or pain not relieved by medications  -Signs of a blood clot in your leg-calf pain, tenderness, redness, swelling of lower leg    When to call your Primary Care Physician:  -Concerns about medical conditions such as diabetes, high blood pressure, asthma, congestive heart failure  -Call if blood sugars are elevated, persistent headache or dizziness, coughing or congestion, constipation or diarrhea, burning with urination, abnormal heart rate    When to call 911 and go to the nearest emergency room:  -Acute onset of chest pain, shortness of breath, difficulty breathing    Activity  -You are going home a well person, be as active as possible. Your only exercise should be walking. Start with short frequent walks and increase your walking distance each day.  -Limit the amount of time you sit to 20-30 minute intervals. Sitting for prolonged periods of time will be uncomfortable for you following surgery.   - Do NOT lift anything over 5 pounds  -Do NOT do any neck exercises Ok thanks until you have been instructed by your doctor  -When you are in bed, you may lay on your back or on either side. Do NOT lie on your stomach    Cervical Collar (Aspen Collar)  -You are required to wear your cervical collar at all times; except while showering. You may remove the collar long enough to change the pads when needed and to change your dressing each day  -Do not bend or twist when your brace is off. It is best to have someone assist you when changing the pads and your dressing to prevent you from bending your neck. Diet  -resume usual diet; drink plenty of fluids; eat foods high in fiber  -It is important to have regular bowel movements. Pain medications may cause constipation. You may want to take a stool softener (such as Senokot-S or Colace) to prevent constipation.  -If constipation occurs, take a laxative (such as Dulcolax tablets, Milk of Magnesia, or a suppository). Laxatives should only be used if the above preventable measures have failed and you still have not had a bowel movement after three days    Driving  -You may not drive or return to work until instructed by your physician. However, you may ride in the car for short periods of time. Incision Care  -You may take brief showers but do not run the water run directly onto the wound. After showering or bathing, remove the wet dressing and gently blot the wound dry with a soft towel.  -Do not rub or apply any lotions or ointments to your incision site.   -Do not soak or scrub your wound  -Keep a dry dressing (ABD and paper tape) on your incision and have it changed daily for 14 days after surgery; more often if your incision is draining. Have your caregiver wash their hands thoroughly before changing your dressing.  -You will have absorbable sutures and steristrips (white tape) on your incision. Leave the steristrips on until they fall off.     Showering  -You may shower in approximately 2 days after your surgery.    -Leave the dressing on during your shower. Do NOT allow the water to run directly onto your dressing. Once you get out of the shower, put on a dry dressing.  -Do not take a tub bath. Preventing blood clots  -You have been given T.E.D. stockings to wear. Continue to wear these for 7 days after your discharge. Put them on in the morning and take them off at night.    -They are used to increase your circulation and prevent blood clots from forming in your legs  -T. E.D. stockings can be machine washed, temperature not to exceed 160° F (71°C) and machine dried for 15 to 20 minutes, temperature not to exceed 250° F (121°C). Pain management  -Take pain medication as prescribed; decrease the amount you use as your pain lessens  -Do not wait until you are in extreme pain to take your medication.  -Avoid alcoholic beverages while taking pain medication    Pain Medication Safety  DO:  -Read the Medication Guide   -Take your medicine exactly as prescribed   -Store your medicine away from children and in a safe place   -Flush unused medicine down the toilet   -Call your healthcare provider for medical advice about side effects. You may report side effects to FDA at 0-155-FDA-4431.   -Please be aware that many medications contain Tylenol. We do not want you to over medicate so please read the information below as a guide. Do not take more than 4 Grams of Tylenol in a 24 hour period.   (There are 1000 milligrams in one Gram)                                                                                                                                                                                                                                                                                                                                                                  Percocet contains 325 mg of Tylenol per tablet (do not take more than 12 tablets in 24 hours)  Lortab contains 500 mg of Tylenol per tablet (do not take more than 8 tablets in 24 hours)  Norco contains 325 mg of Tylenol per tablet (do not take more than 12 tablets in 24 hours). DO NOT:  -Do not give your medicine to others   -Do not take medicine unless it was prescribed for you   -Do not stop taking your medicine without talking to your healthcare provider   -Do not break, chew, crush, dissolve, or inject your medicine. If you cannot  swallow your medicine whole, talk to your healthcare provider.  -Do not drink alcohol while taking this medicine  -Do not take anti-inflammatory medications or aspirin unless instructed by your physician.

## 2019-10-08 NOTE — PROGRESS NOTES
Neurosurgery Progress Note  Ely Felix, AGACNP-BC  Work Cell: [phone]    Post Op Day: 1 Day Post-Op    October 8, 2019     Mary Carmen Pereira    HPI:      Mary Carmen Pereira is a 59 y.o. male with prior medical history notable for DVT/PE, elevated factor VIII, gout, and hypertension. He has had progressive paresthesias after hospitalized with DVT/PE in May 2018. It is more predominant in the right hand. 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 He was diagnosed with diabetic PN with serum a1c in the pre-diabetic range. His right thumb is persistently numb. He then developed balance instability with numbness in his other hand and feet. He had a cervical MRI revealing stenosis C4-7 with cord signal change. .After a discussion of the risks, benefits, and alternatives, Mary Carmen Pereira consented to undergo a  Procedure(s):  C4-5 C5-6 C6-7 ANTERIOR CERVICAL DISCECTOMY AND FUSION WITH DEPUY PEAK, ALLOGRAFT, SKYLINE PLATE    Subjective      Patient ambulating in room. Right thumb still numb. Has not had any loss of balance. Voice strong. No dysphagia. Patient denies headache, dizziness, chest pain, sob, abdominal pain, fever, chills, or nausea/vomiting. Objective:     Physical Exam:  General:  Alert and oriented. No acute distress. Respiratory:  Breathing unlabored. Equal chest expansion   Abdomen:   CV: Soft, non-tender, non-distended  No edema appreciated in the extremities   Neurologic:      Musculoskeletal:  Speech fluent. No facial droop. Follows commands. LI/SILT Motor: full strength and ROM in BUE/BLE. R thumb numb. . Negative hoffmans  Calves soft, nontender upon palpation and with passive stretch. Moves both upper and lower extremities. Incision: Cervical incision clean, dry, and intact. No drainage, erythema or swelling.              Vital Signs:    Patient Vitals for the past 8 hrs:   BP Temp Pulse Resp SpO2   10/08/19 0840 (!) 167/92 98.4 °F (36.9 °C) 77 16 98 % 10/08/19 0257 155/77 98.6 °F (37 °C) 68 14 98 %     Temp (24hrs), Av.5 °F (36.9 °C), Min:98 °F (36.7 °C), Max:99.1 °F (37.3 °C)      Intake/Output:  No intake/output data recorded. 10/06 1901 - 10/08 0700  In: 1250 [I.V.:1250]  Out: 2675 [Urine:2600]    Pain Control:   Pain Assessment  Pain Scale 1: Numeric (0 - 10)  Pain Intensity 1: 2  Pain Onset 1: Postop  Pain Location 1: Throat  Pain Orientation 1: Anterior  Pain Description 1: Sore  Pain Intervention(s) 1: Rest    LAB:    No results for input(s): HCT, HGB, HCTEXT, HGBEXT, HCTEXT, HGBEXT in the last 72 hours. Lab Results   Component Value Date/Time    Sodium 141 10/02/2019 01:39 PM    Potassium 4.2 10/02/2019 01:39 PM    Chloride 105 10/02/2019 01:39 PM    CO2 29 10/02/2019 01:39 PM    Glucose 105 (H) 10/02/2019 01:39 PM    BUN 18 10/02/2019 01:39 PM    Creatinine 1.30 10/02/2019 01:39 PM    Calcium 9.5 10/02/2019 01:39 PM       PT/OT:   Gait:                      Assessment/Plan      1. 1 Day Post-Op Procedure(s):  C4-5 C5-6 C6-7 ANTERIOR CERVICAL DISCECTOMY AND FUSION WITH DEPUY PEAK, ALLOGRAFT, SKYLINE PLATE   -Continue PT/OT   -continue cervical collar   -Pain control- tylenol, prn oxycodone   -Voiding   -Incentive Spirometer   -Tolerating diet   -VTE Prophylaxes - TEDS & SCDs    2. Hx of bilateral PE/DVT in 2018   -can resume Eliquis 10/13    3. Chronic hypertension   -Resume home lisinopril-hctz    Plan above discussed with Dr. Robert Manning    Discharge To:   Home after PT this am    Signed By: Stephan Mack NP

## 2019-10-08 NOTE — PROGRESS NOTES
Neurosurgery Progress Note  Mimi Nguyen, AGACNP-BC  Work Cell: [phone]    Post Op Day: 1 Day Post-Op    October 8, 2019     Valeria Peña    HPI:      Valeria Peña is a 59 y.o. male with prior medical history notable for DVT/PE, elevated factor VIII, gout, and hypertension. He has had progressive paresthesias after hospitalized with DVT/PE in May 2018. It is more predominant in the right hand. 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 He was diagnosed with diabetic PN. His right thumb is persistently numb. He then developed balance instability with numbness in his other hand and feet. He had a cervical MRI revealing stenosis C4-7 with cord signal change. .After a discussion of the risks, benefits, and alternatives, Valeria Peña consented to undergo a  Procedure(s):  C4-5 C5-6 C6-7 ANTERIOR CERVICAL DISCECTOMY AND FUSION WITH DEPUY PEAK, ALLOGRAFT, SKYLINE PLATE    Subjective      Patient ambulating in room. Right thumb still numb. Has not had any loss of balance. Voice strong. No dysphagia. Patient denies headache, dizziness, chest pain, sob, abdominal pain, fever, chills, or nausea/vomiting. Objective:     Physical Exam:  General:  Alert and oriented. No acute distress. Respiratory:  Breathing unlabored. Equal chest expansion   Abdomen:   CV: Soft, non-tender, non-distended  No edema appreciated in the extremities   Neurologic:      Musculoskeletal:  Speech fluent. No facial droop. Follows commands. LI/SILT Motor: full strength and ROM in BUE/BLE. R thumb numb. . Negative hoffmans  Calves soft, nontender upon palpation and with passive stretch. Moves both upper and lower extremities. Incision: Cervical incision clean, dry, and intact. No drainage, erythema or swelling.              Vital Signs:    Patient Vitals for the past 8 hrs:   BP Temp Pulse Resp SpO2   10/08/19 0840 (!) 167/92 98.4 °F (36.9 °C) 77 16 98 %   10/08/19 0257 155/77 98.6 °F (37 °C) 68 14 98 % Temp (24hrs), Av.5 °F (36.9 °C), Min:98 °F (36.7 °C), Max:99.1 °F (37.3 °C)      Intake/Output:  No intake/output data recorded. 10/06 1901 - 10/08 0700  In: 1250 [I.V.:1250]  Out: 2675 [Urine:2600]    Pain Control:   Pain Assessment  Pain Scale 1: Numeric (0 - 10)  Pain Intensity 1: 2  Pain Onset 1: Postop  Pain Location 1: Throat  Pain Orientation 1: Anterior  Pain Description 1: Sore  Pain Intervention(s) 1: Rest    LAB:    No results for input(s): HCT, HGB, HCTEXT, HGBEXT in the last 72 hours. Lab Results   Component Value Date/Time    Sodium 141 10/02/2019 01:39 PM    Potassium 4.2 10/02/2019 01:39 PM    Chloride 105 10/02/2019 01:39 PM    CO2 29 10/02/2019 01:39 PM    Glucose 105 (H) 10/02/2019 01:39 PM    BUN 18 10/02/2019 01:39 PM    Creatinine 1.30 10/02/2019 01:39 PM    Calcium 9.5 10/02/2019 01:39 PM       PT/OT:   Gait:                      Assessment/Plan      1. 1 Day Post-Op Procedure(s):  C4-5 C5-6 C6-7 ANTERIOR CERVICAL DISCECTOMY AND FUSION WITH DEPUY PEAK, ALLOGRAFT, SKYLINE PLATE   -Continue PT/OT   -continue cervical collar   -Pain control- tylenol, prn oxycodone   -Voiding   -Incentive Spirometer   -Tolerating diet   -VTE Prophylaxes - TEDS & SCDs    2. Hx of bilateral PE/DVT in 2018   -can resume Eliquis 10/13    3. Chronic hypertension   -Resume home lisinopril-hctz    Plan above discussed with Dr. Marcella Campoverde    Discharge To:   Home after PT this am    Signed By: Alcira Sanchez NP

## 2019-10-08 NOTE — PROGRESS NOTES
DEYANIRA:  1. Home with family assistance. Care Management Interventions  PCP Verified by CM: Yes  Palliative Care Criteria Met (RRAT>21 & CHF Dx)?: No  Mode of Transport at Discharge: Other (see comment)  MyChart Signup: Yes  Discharge Durable Medical Equipment: No  Health Maintenance Reviewed: Yes  Physical Therapy Consult: Yes  Occupational Therapy Consult: Yes  Speech Therapy Consult: No  Current Support Network: Lives with Spouse  Confirm Follow Up Transport: Family  Plan discussed with Pt/Family/Caregiver: Yes  [place] Resource Information Provided?: No  Discharge Location  Discharge Placement: Home with family assistance    Reason for Admission:   Cervical stenosis of spinal canal                   RRAT Score:          5           Plan for utilizing home health:   Not appropriate at this time. Current Advanced Directive/Advance Care Plan:  Not on file. Transition of Care Plan:           Reviewed chart for transitions of care, and discussed in rounds. CM met with patient and his wife at bedside to explain role and offer support. Patient is alert and oriented x4, and confirmed demographics. He will discharge home with his wife providing transportation. Patient does not need any home health needs at this time. Observation notice provided in writing to patient and/or caregiver as well as verbal explanation of the policy. Patients who are in outpatient status also receive the Observation notice.   Tesha Ortez [facility]

## 2019-10-09 NOTE — DISCHARGE SUMMARY
[address] [place] [address]   [address]  [phone]     Discharge Summary       PATIENT ID: Douglas Shah  MRN: [id number]   YOB: 1960    DATE OF ADMISSION: 10/7/2019  5:41 AM    DATE OF DISCHARGE: 10/8/2019  PRIMARY CARE PROVIDER: Curt Ramires MD     CONSULTATIONS: None    PROCEDURES/SURGERIES: Procedure(s):  C4-5 C5-6 C6-7 ANTERIOR CERVICAL DISCECTOMY AND FUSION WITH DEPUY PEAK, ALLOGRAFT, SKYLINE PLATE    History of Present Illness:  Douglas Shah is a 59 y.o. male with prior medical history notable for DVT/PE, elevated factor VIII, gout, and hypertension. He has had progressive paresthesias after hospitalized with DVT/PE in May 2018. It is more predominant in the right hand. 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 He was diagnosed with diabetic PN with serum a1c in the pre-diabetic range. His right thumb is persistently numb. He then developed balance instability with numbness in his other hand and feet. He had a cervical MRI revealing stenosis C4-7 with cord signal change. After failing conservative therapy and a discussion of the risks, benefits, alternatives, perioperative course, and potential complications of surgery, he consented to undergo a Procedure(s):  C4-5 C5-6 C6-7 ANTERIOR CERVICAL DISCECTOMY AND FUSION WITH DEPUY PEAK, ALLOGRAFT, SKYLINE PLATE. Hospital Course:  Douglas Shah tolerated the procedure well. He was placed in a cervical collar. He was transferred  to the recovery room in stable condition. After a brief stay the patient was then transferred to the Spinal Surgery Unit at [address] [place] [address].  On postoperative day #1, the dressing was clean and dry, he was neurovascularly intact. The patient was afebrile and vital signs were stable. Calves were soft and non-tender bilaterally.  The patient was tolerating a regular diet without swallowing troubles, voiding, and making progress with physical therapy. Milan Almonte made satisfactory progress with physical therapy and was discharged to Home in stable condition on postoperative day 1. He was provided with routine postoperative instructions and advised to follow up with  Brooke Dorsey MD in 2 weeks following discharge from the hospital.      FOLLOW UP APPOINTMENTS:    Follow-up Information     Follow up With Specialties Details Why Contact Info    Jessica Jimenez, [address] [facility]  [address] [address] [facility]  [phone]               DISCHARGE MEDICATIONS:  Discharge Medication List as of 10/8/2019 10:54 AM      START taking these medications    Details   oxyCODONE IR (ROXICODONE) 5 mg immediate release tablet Take 1 Tab by mouth every six (6) hours as needed for Pain for up to 5 days. Max Daily Amount: 20 mg., Print, Disp-20 Tab, R-0         CONTINUE these medications which have NOT CHANGED    Details   colchicine 0.6 mg tablet TAKE 2 AT THE FIRST SIGN OF FLARE - THEN 1 TAB 1HR LATER, THEN 1 TAB EVERY DAY TIL SYMPTOMS RESOLVE, Historical Med, R-0      avanafil (STENDRA) 200 mg tab tablet Stendra 200 mg tablet   0.5-1 tablet by mouth once a day 30 minutes prn prior to need, Historical Med      cholecalciferol (VITAMIN D3) 1,000 unit cap Vitamin D3 1,000 unit capsule   Take by oral route., Historical Med      mv-mins/folic/lycopene/ginkgo (ONE-A-DAY MEN 50 PLUS, GINKGO, PO) Take 1 Tab by mouth daily. , Historical Med      lisinopril-hydroCHLOROthiazide (PRINZIDE, ZESTORETIC) 20-25 mg per tablet Take 1 Tab by mouth daily. , Normal, Disp-90 Tab, R-1      apixaban (ELIQUIS) 5 mg tablet TAKE 1 TALBET BY MOUTH 2 TIMES A DAY, Normal, Disp-180 Tab, R-3         STOP taking these medications       indomethacin (INDOCIN) 50 mg capsule Comments:   Reason for Stopping:               CHRONIC MEDICAL DIAGNOSES:  Problem List as of 10/8/2019 Date Reviewed: 10/7/2019          Codes Class Noted - Resolved    Cervical stenosis of spinal canal ICD-10-CM: M48.02  ICD-9-CM: 723.0  10/7/2019 - Present        Idiopathic small and large fiber sensory neuropathy ICD-10-CM: G60.8  ICD-9-CM: 356.4  3/11/2019 - Present        History of DVT (deep vein thrombosis) ICD-10-CM: Z86.718  ICD-9-CM: V12.51  1/27/2019 - Present        History of pulmonary embolus (PE) ICD-10-CM: Z86.711  ICD-9-CM: V12.55  1/27/2019 - Present        Abnormal ECG ICD-10-CM: R94.31  ICD-9-CM: 794.31  5/22/2018 - Present        Near syncope ICD-10-CM: R55  ICD-9-CM: 780.2  5/22/2018 - Present        Elevated factor VIII level ICD-10-CM: R79.1  ICD-9-CM: 790.92  5/22/2018 - Present        Dilated aortic root (HCC) ICD-10-CM: I77.810  ICD-9-CM: 447.71  9/18/2017 - Present        Essential hypertension ICD-10-CM: I10  ICD-9-CM: 401.9  8/15/2017 - Present        Idiopathic gout ICD-10-CM: M10.00  ICD-9-CM: 274.9  8/15/2017 - Present        Palpitations ICD-10-CM: R00.2  ICD-9-CM: 785.1  6/22/2017 - Present        ED (erectile dysfunction) ICD-10-CM: N52.9  ICD-9-CM: 607.84  11/25/2013 - Present        BPH (benign prostatic hyperplasia) ICD-10-CM: N40.0  ICD-9-CM: 600.00  10/11/2013 - Present    Overview Signed 8/15/2017  8:17 AM by Crissy JENKINS     2015 biopsy negative. RESOLVED: DVT, bilateral lower limbs ([address] ICD-10-CM: I82.403  ICD-9-CM: 453.40  5/22/2018 - 1/27/2019        RESOLVED: Bilateral pulmonary embolism ([address] ICD-10-CM: I26.99  ICD-9-CM: 415.19  5/22/2018 - 1/27/2019        RESOLVED: White coat hypertension ICD-10-CM: BGL2866  ICD-9-CM: Lynn Singer  1/20/2016 - 3/16/2018        RESOLVED: Hypertension ICD-10-CM: I10  ICD-9-CM: 401.9  Unknown - 8/15/2017        RESOLVED: Thromboembolus ([facility] [address] ICD-10-CM: I74.9  ICD-9-CM: 444.9  Unknown - 1/27/2019    Overview Addendum 4/22/2014  6:31 AM by Lucien Tam     2009 & 2012. 2014 elevated D dimer.                    Signed:   Donna Boast, NP  10/9/2019  11:31 AM

## 2019-10-12 ENCOUNTER — HOSPITAL ENCOUNTER (INPATIENT)
Age: 59
LOS: 2 days | Discharge: HOME | DRG: 176 | End: 2019-10-14
Attending: EMERGENCY MEDICINE | Admitting: HOSPITALIST
Payer: COMMERCIAL

## 2019-10-12 ENCOUNTER — APPOINTMENT (OUTPATIENT)
Dept: VASCULAR SURGERY | Age: 59
DRG: 176 | End: 2019-10-12
Attending: PHYSICIAN ASSISTANT
Payer: COMMERCIAL

## 2019-10-12 ENCOUNTER — APPOINTMENT (OUTPATIENT)
Dept: CT IMAGING | Age: 59
DRG: 176 | End: 2019-10-12
Attending: PHYSICIAN ASSISTANT
Payer: COMMERCIAL

## 2019-10-12 DIAGNOSIS — Z86.718 HISTORY OF DVT (DEEP VEIN THROMBOSIS): ICD-10-CM

## 2019-10-12 DIAGNOSIS — M48.02 CERVICAL STENOSIS OF SPINAL CANAL: ICD-10-CM

## 2019-10-12 DIAGNOSIS — I26.99 ACUTE PULMONARY EMBOLISM WITHOUT ACUTE COR PULMONALE, UNSPECIFIED PULMONARY EMBOLISM TYPE (HCC): Primary | ICD-10-CM

## 2019-10-12 DIAGNOSIS — I10 ESSENTIAL HYPERTENSION: ICD-10-CM

## 2019-10-12 DIAGNOSIS — R03.0 ELEVATED BLOOD PRESSURE READING: ICD-10-CM

## 2019-10-12 DIAGNOSIS — R79.1 ELEVATED FACTOR VIII LEVEL: ICD-10-CM

## 2019-10-12 LAB
ANION GAP BLD CALC-SCNC: 15 MMOL/L (ref 10–20)
APTT PPP: 27.6 SEC (ref 22.1–32)
BASOPHILS # BLD: 0 K/UL (ref 0–0.1)
BASOPHILS NFR BLD: 1 % (ref 0–1)
BUN BLD-MCNC: 17 MG/DL (ref 9–20)
CA-I BLD-MCNC: 1.24 MMOL/L (ref 1.12–1.32)
CHLORIDE BLD-SCNC: 97 MMOL/L (ref 98–107)
CO2 BLD-SCNC: 31 MMOL/L (ref 21–32)
COMMENT, HOLDF: NORMAL
CREAT BLD-MCNC: 1 MG/DL (ref 0.6–1.3)
DIFFERENTIAL METHOD BLD: ABNORMAL
EOSINOPHIL # BLD: 0.1 K/UL (ref 0–0.4)
EOSINOPHIL NFR BLD: 2 % (ref 0–7)
ERYTHROCYTE [DISTWIDTH] IN BLOOD BY AUTOMATED COUNT: 14.5 % (ref 11.5–14.5)
GLUCOSE BLD-MCNC: 89 MG/DL (ref 65–100)
HCT VFR BLD AUTO: 41.8 % (ref 36.6–50.3)
HCT VFR BLD CALC: 48 % (ref 36.6–50.3)
HGB BLD-MCNC: 13.5 G/DL (ref 12.1–17)
IMM GRANULOCYTES # BLD AUTO: 0 K/UL (ref 0–0.04)
IMM GRANULOCYTES NFR BLD AUTO: 0 % (ref 0–0.5)
LYMPHOCYTES # BLD: 1.4 K/UL (ref 0.8–3.5)
LYMPHOCYTES NFR BLD: 37 % (ref 12–49)
MCH RBC QN AUTO: 26.3 PG (ref 26–34)
MCHC RBC AUTO-ENTMCNC: 32.3 G/DL (ref 30–36.5)
MCV RBC AUTO: 81.5 FL (ref 80–99)
MONOCYTES # BLD: 0.4 K/UL (ref 0–1)
MONOCYTES NFR BLD: 12 % (ref 5–13)
NEUTS SEG # BLD: 1.8 K/UL (ref 1.8–8)
NEUTS SEG NFR BLD: 48 % (ref 32–75)
NRBC # BLD: 0 K/UL (ref 0–0.01)
NRBC BLD-RTO: 0 PER 100 WBC
PLATELET # BLD AUTO: 232 K/UL (ref 150–400)
PMV BLD AUTO: 9.7 FL (ref 8.9–12.9)
POTASSIUM BLD-SCNC: 3.9 MMOL/L (ref 3.5–5.1)
RBC # BLD AUTO: 5.13 M/UL (ref 4.1–5.7)
SAMPLES BEING HELD,HOLD: NORMAL
SERVICE CMNT-IMP: ABNORMAL
SODIUM BLD-SCNC: 139 MMOL/L (ref 136–145)
THERAPEUTIC RANGE,PTTT: NORMAL SECS (ref 58–77)
WBC # BLD AUTO: 3.7 K/UL (ref 4.1–11.1)

## 2019-10-12 PROCEDURE — 36415 COLL VENOUS BLD VENIPUNCTURE: CPT

## 2019-10-12 PROCEDURE — 65660000000 HC RM CCU STEPDOWN

## 2019-10-12 PROCEDURE — 99285 EMERGENCY DEPT VISIT HI MDM: CPT

## 2019-10-12 PROCEDURE — 71275 CT ANGIOGRAPHY CHEST: CPT

## 2019-10-12 PROCEDURE — 74011000258 HC RX REV CODE- 258: Performed by: RADIOLOGY

## 2019-10-12 PROCEDURE — 85025 COMPLETE CBC W/AUTO DIFF WBC: CPT

## 2019-10-12 PROCEDURE — 93005 ELECTROCARDIOGRAM TRACING: CPT

## 2019-10-12 PROCEDURE — 93971 EXTREMITY STUDY: CPT

## 2019-10-12 PROCEDURE — 85730 THROMBOPLASTIN TIME PARTIAL: CPT

## 2019-10-12 PROCEDURE — 74011636320 HC RX REV CODE- 636/320: Performed by: RADIOLOGY

## 2019-10-12 PROCEDURE — 80047 BASIC METABLC PNL IONIZED CA: CPT

## 2019-10-12 PROCEDURE — 74011250636 HC RX REV CODE- 250/636: Performed by: EMERGENCY MEDICINE

## 2019-10-12 PROCEDURE — 74011250636 HC RX REV CODE- 250/636: Performed by: NURSE PRACTITIONER

## 2019-10-12 PROCEDURE — 96374 THER/PROPH/DIAG INJ IV PUSH: CPT

## 2019-10-12 PROCEDURE — 74011250637 HC RX REV CODE- 250/637: Performed by: NURSE PRACTITIONER

## 2019-10-12 RX ORDER — LORAZEPAM 2 MG/ML
1 INJECTION INTRAMUSCULAR
Status: COMPLETED | OUTPATIENT
Start: 2019-10-12 | End: 2019-10-12

## 2019-10-12 RX ORDER — OXYCODONE HYDROCHLORIDE 5 MG/1
5 TABLET ORAL
Status: DISCONTINUED | OUTPATIENT
Start: 2019-10-12 | End: 2019-10-14 | Stop reason: HOSPADM

## 2019-10-12 RX ORDER — BISACODYL 5 MG
5 TABLET, DELAYED RELEASE (ENTERIC COATED) ORAL DAILY PRN
Status: DISCONTINUED | OUTPATIENT
Start: 2019-10-12 | End: 2019-10-14 | Stop reason: HOSPADM

## 2019-10-12 RX ORDER — HEPARIN SODIUM 5000 [USP'U]/ML
80 INJECTION, SOLUTION INTRAVENOUS; SUBCUTANEOUS ONCE
Status: COMPLETED | OUTPATIENT
Start: 2019-10-12 | End: 2019-10-12

## 2019-10-12 RX ORDER — ONDANSETRON 2 MG/ML
4 INJECTION INTRAMUSCULAR; INTRAVENOUS
Status: DISCONTINUED | OUTPATIENT
Start: 2019-10-12 | End: 2019-10-14 | Stop reason: HOSPADM

## 2019-10-12 RX ORDER — CLONIDINE HYDROCHLORIDE 0.1 MG/1
0.2 TABLET ORAL
Status: COMPLETED | OUTPATIENT
Start: 2019-10-12 | End: 2019-10-12

## 2019-10-12 RX ORDER — HEPARIN SODIUM 10000 [USP'U]/100ML
15-36 INJECTION, SOLUTION INTRAVENOUS
Status: DISCONTINUED | OUTPATIENT
Start: 2019-10-12 | End: 2019-10-14

## 2019-10-12 RX ORDER — SODIUM CHLORIDE 0.9 % (FLUSH) 0.9 %
5-40 SYRINGE (ML) INJECTION EVERY 8 HOURS
Status: DISCONTINUED | OUTPATIENT
Start: 2019-10-12 | End: 2019-10-14 | Stop reason: HOSPADM

## 2019-10-12 RX ORDER — SODIUM CHLORIDE 0.9 % (FLUSH) 0.9 %
10 SYRINGE (ML) INJECTION
Status: COMPLETED | OUTPATIENT
Start: 2019-10-12 | End: 2019-10-12

## 2019-10-12 RX ORDER — MELATONIN
1000 DAILY
Status: DISCONTINUED | OUTPATIENT
Start: 2019-10-13 | End: 2019-10-12

## 2019-10-12 RX ORDER — HEPARIN SODIUM 5000 [USP'U]/ML
80 INJECTION, SOLUTION INTRAVENOUS; SUBCUTANEOUS ONCE
Status: DISCONTINUED | OUTPATIENT
Start: 2019-10-12 | End: 2019-10-12

## 2019-10-12 RX ORDER — HEPARIN SODIUM 10000 [USP'U]/100ML
15-36 INJECTION, SOLUTION INTRAVENOUS
Status: DISCONTINUED | OUTPATIENT
Start: 2019-10-12 | End: 2019-10-12 | Stop reason: SDUPTHER

## 2019-10-12 RX ORDER — SENNOSIDES 8.6 MG/1
2 TABLET ORAL DAILY
COMMUNITY

## 2019-10-12 RX ORDER — HYDRALAZINE HYDROCHLORIDE 20 MG/ML
10 INJECTION INTRAMUSCULAR; INTRAVENOUS
Status: DISCONTINUED | OUTPATIENT
Start: 2019-10-12 | End: 2019-10-14 | Stop reason: HOSPADM

## 2019-10-12 RX ORDER — HEPARIN SODIUM 10000 [USP'U]/100ML
15-36 INJECTION, SOLUTION INTRAVENOUS
Status: DISCONTINUED | OUTPATIENT
Start: 2019-10-12 | End: 2019-10-12

## 2019-10-12 RX ORDER — SODIUM CHLORIDE 0.9 % (FLUSH) 0.9 %
5-40 SYRINGE (ML) INJECTION AS NEEDED
Status: DISCONTINUED | OUTPATIENT
Start: 2019-10-12 | End: 2019-10-14 | Stop reason: HOSPADM

## 2019-10-12 RX ORDER — NALOXONE HYDROCHLORIDE 0.4 MG/ML
0.4 INJECTION, SOLUTION INTRAMUSCULAR; INTRAVENOUS; SUBCUTANEOUS AS NEEDED
Status: DISCONTINUED | OUTPATIENT
Start: 2019-10-12 | End: 2019-10-14 | Stop reason: HOSPADM

## 2019-10-12 RX ADMIN — Medication 10 ML: at 17:12

## 2019-10-12 RX ADMIN — IOPAMIDOL 80 ML: 755 INJECTION, SOLUTION INTRAVENOUS at 17:12

## 2019-10-12 RX ADMIN — Medication 10 ML: at 22:00

## 2019-10-12 RX ADMIN — CLONIDINE HYDROCHLORIDE 0.2 MG: 0.1 TABLET ORAL at 18:09

## 2019-10-12 RX ADMIN — SODIUM CHLORIDE 100 ML: 900 INJECTION, SOLUTION INTRAVENOUS at 17:12

## 2019-10-12 RX ADMIN — HEPARIN SODIUM AND DEXTROSE 15 UNITS/KG/HR: 10000; 5 INJECTION INTRAVENOUS at 23:57

## 2019-10-12 RX ADMIN — LORAZEPAM 1 MG: 2 INJECTION, SOLUTION INTRAMUSCULAR; INTRAVENOUS at 18:38

## 2019-10-12 RX ADMIN — HEPARIN SODIUM 7550 UNITS: 5000 INJECTION INTRAVENOUS; SUBCUTANEOUS at 23:57

## 2019-10-12 NOTE — ED NOTES
S/w pharmacy- Heparin to be held until 1100 due to patient taking last anticoagulant dose at 1100. S/w Dr. Jaylin Vasquez who agrees to hold until 2300.

## 2019-10-12 NOTE — PROGRESS NOTES
Admission Medication Reconciliation:    Information obtained from:  Patient  RxQuery data available¹:  yes    Comments    1) patient is a good historian and was able to provide updates to the PTA medication list.    2)  Medication changes (since last review): Added  - Apixaban (takes 5mg BID, last dose was 11am)  -Senna    Adjusted  - none    Removed  - Stendra, Vit D3m, and multi vitamin    3)  Patient restarted Apixaban last Wed. (after he completed his surgery last Monday)     ¹RxQuery pharmacy benefit data reflects medications filled and processed through the patient's insurance, however   this data does NOT capture whether the medication was picked up or is currently being taken by the patient. Allergies:  Levaquin [levofloxacin]    Significant PMH/Disease States:   Past Medical History:   Diagnosis Date    Bilateral pulmonary embolism ([facility] [address] 5/22/2018    BPH (benign prostatic hyperplasia)     DVT, bilateral lower limbs ([facility] [address] 5/22/2018    Gout     HTN (hypertension)     Hypertension     Neutropenia ([facility] [address]     Prediabetes     Thromboembolus ([facility] [address]     2009 & 2012    Thromboembolus [facility])     2009 & 2012. 2014 elevated D dimer. Chief Complaint for this Admission:    Chief Complaint   Patient presents with    Leg Pain     Prior to Admission Medications:   Prior to Admission Medications   Prescriptions Last Dose Informant Patient Reported? Taking? apixaban (ELIQUIS) 5 mg tablet 10/12/2019 at Unknown time  Yes Yes   Sig: Take 5 mg by mouth two (2) times a day. colchicine 0.6 mg tablet   Yes Yes   Sig: TAKE 2 AT THE FIRST SIGN OF FLARE - THEN 1 TAB 1HR LATER, THEN 1 TAB EVERY DAY TIL SYMPTOMS RESOLVE   lisinopril-hydroCHLOROthiazide (PRINZIDE, ZESTORETIC) 20-25 mg per tablet 10/12/2019 at Unknown time  No Yes   Sig: Take 1 Tab by mouth daily.    oxyCODONE IR (ROXICODONE) 5 mg immediate release tablet 10/12/2019 at Unknown time  No Yes   Sig: Take 1 Tab by mouth every six (6) hours as needed for Pain for up to 5 days. Max Daily Amount: 20 mg.   senna (SENNA) 8.6 mg tablet 10/11/2019 at Unknown time  Yes Yes   Sig: Take 2 Tabs by mouth daily. Facility-Administered Medications: None       Please contact the main inpatient pharmacy with any questions or concerns at [phone] and we will direct you to the clinical pharmacist covering this patient's care while in-house.    Romario Castro, LINAD

## 2019-10-12 NOTE — ED TRIAGE NOTES
Arrived from home with wife c/o right leg pain. Aspen collar in place dressing noted to anterior neck. Patient was recently discharged Tuesday. Pain only to sensation.

## 2019-10-12 NOTE — H&P
History & Physical    Primary Care Provider: Cherise Thomas MD  Source of Information: Patient     History of Presenting Illness:   Shahriar Heck is a 59 y.o. male who presents with chest pain and right leg discomfort     WM with h/o multiple unprovoked VTE since 2009, elevated Factor VIII level was recommended on life-long anticoagulation by his hematologist. He is on eliquis at home. Patient said he had an episode of sob back to 10/2 and he was worked up in the emergency department on October 2 and had a normal work-up chest CTA. He was still on eliquis on that day. Then next day 10/3, he stopped his eliquis per recommendation of NS for his surgery. he was admitted to the hospital for cervical fusion on 10/7. His eliquis was off for 3 more days post-op per the NS recommendation and just restarted his ac on Wednesday 10/9. He noticed increasing  right lower extremity pain on post-op day 1. He feels increasing chest pain last 2 days so he came back to ER. In ER. Noticed bp high. Pt said \" I am having white coat syndrome\"   Denied sob . Denied headache,      Review of Systems:  General: no fever, no changes of appetite or weight   HEENT: no headache, no vision changes, no nose discharge, no hearing changes   RES: no wheezing, no cough, hpi   CVS: no cp, no palpitation. Muscular:wear aspen collar, HPI.  No joint swelling   Skin: no rash, no itching   GI: no vomiting, no diarrhea  : no dysuria, no hematuria  Hemo: no gum bleeding, no petechial   Neuro: no sensation changes, no focal weakness   Endo: no polydipsia   Psych: denied depression     Past Medical History:   Diagnosis Date    Bilateral pulmonary embolism ([address] 5/22/2018    BPH (benign prostatic hyperplasia)     DVT, bilateral lower limbs ([address] 5/22/2018    Gout     HTN (hypertension)     Hypertension     Neutropenia ([address]     Prediabetes     [facility]     2009 & 2012    Thromboembolus [facility]) 2009 & 2012. 2014 elevated D dimer. Past Surgical History:   Procedure Laterality Date    HX COLONOSCOPY  2012    HX COLONOSCOPY  2014    HX HEENT  2015    ORAL SURGERY X 3    HX HEENT  04/2015    SINUS SURGERY    HX KNEE ARTHROSCOPY Left      Prior to Admission medications    Medication Sig Start Date End Date Taking? Authorizing Provider   oxyCODONE IR (ROXICODONE) 5 mg immediate release tablet Take 1 Tab by mouth every six (6) hours as needed for Pain for up to 5 days. Max Daily Amount: 20 mg. 10/8/19 10/13/19  Hilary Meredith NP   colchicine 0.6 mg tablet TAKE 2 AT THE FIRST SIGN OF FLARE - THEN 1 TAB 1HR LATER, THEN 1 TAB EVERY DAY TIL SYMPTOMS RESOLVE 8/16/19   Other, MD Luisito   avanafil (STENDRA) 200 mg tab tablet Stendra 200 mg tablet   0.5-1 tablet by mouth once a day 30 minutes prn prior to need    OtherLuisito MD   cholecalciferol (VITAMIN D3) 1,000 unit cap Vitamin D3 1,000 unit capsule   Take by oral route. Other, MD Luisito   mv-mins/folic/lycopene/ginkgo (ONE-A-DAY MEN 50 PLUS, GINKGO, PO) Take 1 Tab by mouth daily. Provider, Historical   lisinopril-hydroCHLOROthiazide (PRINZIDE, ZESTORETIC) 20-25 mg per tablet Take 1 Tab by mouth daily.  7/8/19   Larry Baer PA-C     Allergies   Allergen Reactions    Levaquin [Levofloxacin] Rash      Family History   Problem Relation Age of Onset    Cancer Mother         multiple myeloma    Diabetes Mother     Hypertension Mother     Stroke Mother     Cancer Father         pancreatic    Heart Disease Father     Hypertension Father     No Known Problems Brother     Hypertension Brother         Resolved    Other Brother         Sarcoid    No Known Problems Son     No Known Problems Daughter     Anesth Problems Neg Hx         SOCIAL HISTORY:  Patient resides:  Independently x   Assisted Living    SNF    With family care       Smoking history:   None x   Former    Chronic      Alcohol history:   None x   Social    Chronic Ambulates:   Independently x   w/cane    w/walker    w/wc    CODE STATUS:  DNR    Full x   Other      Objective:     Physical Exam:     Visit Vitals  BP (!) 209/101   Pulse 74   Temp 98.2 °F (36.8 °C)   Resp 23   Wt 94.3 kg (208 lb)   SpO2 98%   BMI 27.44 kg/m²      O2 Device: Room air    General:  Alert, cooperative, no distress, appears stated age. Head:  Normocephalic, without obvious abnormality, atraumatic. Eyes:  Conjunctivae/corneas clear. PERRL, EOMs intact. Nose: Nares normal. Septum midline. Mucosa normal. No drainage or sinus tenderness. Throat: Lips, mucosa, and tongue normal. Teeth and gums normal.   Neck: Aspen collar. .   Back:   Symmetric, no curvature. ROM normal. No CVA tenderness. Lungs:   Clear to auscultation bilaterally. Chest wall:  No tenderness or deformity. Heart:  Regular rate and rhythm, S1, S2 normal, no murmur, click, rub or gallop. Abdomen:   Soft, non-tender. Bowel sounds normal. No masses,  No organomegaly. Extremities: Right leg + edema, mild tenderness to touch. Left leg normal    Pulses: 2+ and symmetric all extremities. Skin: Skin color, texture, turgor normal. No rashes or lesions   Neurologic: CNII-XII intact. No focal weakness              Data Review:     Recent Days:  No results for input(s): WBC, HGB, HCT, PLT, HGBEXT, HCTEXT, PLTEXT in the last 72 hours. No results for input(s): NA, K, CL, CO2, GLU, BUN, CREA, CA, MG, PHOS, ALB, TBIL, SGOT, ALT, INR, INREXT in the last 72 hours. No results for input(s): PH, PCO2, PO2, HCO3, FIO2 in the last 72 hours.     24 Hour Results:  Recent Results (from the past 24 hour(s))   POC CHEM8    Collection Time: 10/12/19  4:37 PM   Result Value Ref Range    Calcium, ionized (POC) 1.24 1.12 - 1.32 mmol/L    Sodium (POC) 139 136 - 145 mmol/L    Potassium (POC) 3.9 3.5 - 5.1 mmol/L    Chloride (POC) 97 (L) 98 - 107 mmol/L    CO2 (POC) 31 21 - 32 mmol/L    Anion gap (POC) 15 10 - 20 mmol/L    Glucose (POC) 89 65 - 100 mg/dL    BUN (POC) 17 9 - 20 mg/dL    Creatinine (POC) 1.0 0.6 - 1.3 mg/dL    GFRAA, POC >60 >60 ml/min/1.73m2    GFRNA, POC >60 >60 ml/min/1.73m2    Hematocrit (POC) 48 36.6 - 50.3 %    Comment Comment Not Indicated. EKG, 12 LEAD, INITIAL    Collection Time: 10/12/19  6:07 PM   Result Value Ref Range    Ventricular Rate 77 BPM    Atrial Rate 77 BPM    P-R Interval 124 ms    QRS Duration 112 ms    Q-T Interval 366 ms    QTC Calculation (Bezet) 414 ms    Calculated P Axis 59 degrees    Calculated R Axis -41 degrees    Calculated T Axis 55 degrees    Diagnosis       Normal sinus rhythm  Left atrial enlargement  Left axis deviation  When compared with ECG of 02-OCT-2019 13:44,  premature ventricular complexes are no longer present           Imaging:   Cta Chest W Or W Wo Cont    Result Date: 10/12/2019  IMPRESSION: Acute PE, as described above. Assessment:     Active Problems:    PE (pulmonary thromboembolism) ([address] (10/12/2019)           Plan:     1. Acute PE: compared to CTA of chest 10 days ago. This recurrent acute PE likely related to his recent off [facility] for his cervical spine procedure. Pt is started on heparin drip for now. Will consult his hematologist. This may not consider as eliquis failure.           Signed By: Sonia Rodriguez MD     October 12, 2019

## 2019-10-12 NOTE — ED PROVIDER NOTES
78-year-old male with history of DVT and recent admission to the hospital for cervical fusion presents to the emergency department with right lower extremity pain. Patient was worked up in the emergency department on October 2 for shortness of breath and had a normal work-up at that time. Patient states that shortly after that he was admitted to the hospital for cervical fusion and 1 day after he was discharged he developed the right lower extremity pain which was this past Tuesday. He denies any redness or warmth of the right lower extremity. He has not taken anything over-the-counter for his symptoms. Patient did complain of some mild shortness of breath over the last couple of days but is not experiencing any today. He specifically denies fever hemoptysis chest pain or recent travel outside of the country. Temo Rae PA-C           Past Medical History:   Diagnosis Date    Bilateral pulmonary embolism ([address] 5/22/2018    BPH (benign prostatic hyperplasia)     DVT, bilateral lower limbs ([address] 5/22/2018    Gout     HTN (hypertension)     Hypertension     Neutropenia ([address]     Prediabetes     Throm[facility]emb[facility]us ([facility]     2009 & 2012    Thromboembolus [facility])     2009 & 2012. 2014 elevated D dimer.         Past Surgical History:   Procedure Laterality Date    HX COLONOSCOPY  2012    HX COLONOSCOPY  2014    HX HEENT  2015    ORAL SURGERY X 3    HX HEENT  04/2015    SINUS SURGERY    HX KNEE ARTHROSCOPY Left          Family History:   Problem Relation Age of Onset    Cancer Mother         multiple myeloma    Diabetes Mother     Hypertension Mother     Stroke Mother     Cancer Father         pancreatic    Heart Disease Father     Hypertension Father     No Known Problems Brother     Hypertension Brother         Resolved    Other Brother         Sarcoid    No Known Problems Son     No Known Problems Daughter     Anesth Problems Neg Hx        Social History     Socioeconomic History    Marital status:      Spouse name: Not on file    Number of children: Not on file    Years of education: Not on file    Highest education level: Not on file   Occupational History    Occupation: Retired   Social Needs    Financial resource strain: Not on file    Food insecurity:     Worry: Not on file     Inability: Not on file   Threadbox needs:     Medical: Not on file     Non-medical: Not on file   Tobacco Use    Smoking status: Never Smoker    Smokeless tobacco: Never Used   Substance and Sexual Activity    Alcohol use: No    Drug use: No    Sexual activity: Yes     Partners: Female     Comment:  [employer]   Lifestyle    Physical activity:     Days per week: Not on file     Minutes per session: Not on file    Stress: Not on file   Relationships    Social connections:     Talks on phone: Not on file     Gets together: Not on file     Attends Hindu service: Not on file     Active member of club or organization: Not on file     Attends meetings of clubs or organizations: Not on file     Relationship status: Not on file    Intimate partner violence:     Fear of current or ex partner: Not on file     Emotionally abused: Not on file     Physically abused: Not on file     Forced sexual activity: Not on file   Other Topics Concern    Not on file   Social History Narrative    Lives in [address] with wife         ALLERGIES: Levaquin [levofloxacin]    Review of Systems   Constitutional: Negative for chills, diaphoresis and fever. HENT: Negative for congestion, postnasal drip, rhinorrhea and sore throat. Eyes: Negative for photophobia, discharge, redness and visual disturbance. Respiratory: Positive for shortness of breath. Negative for cough, chest tightness and wheezing. Cardiovascular: Negative for chest pain, palpitations and leg swelling.    Gastrointestinal: Negative for abdominal distention, abdominal pain, blood in stool, constipation, diarrhea, nausea and vomiting. Genitourinary: Negative for difficulty urinating, dysuria, frequency, hematuria and urgency. Musculoskeletal: Negative for arthralgias, back pain, joint swelling and myalgias. Skin: Negative for color change and rash. Neurological: Negative for dizziness, speech difficulty, weakness, light-headedness, numbness and headaches. Psychiatric/Behavioral: Negative for confusion. The patient is not nervous/anxious. All other systems reviewed and are negative. Vitals:    10/12/19 1311   BP: (!) 212/104   Pulse: 74   Resp: 17   Temp: 98.2 °F (36.8 °C)   SpO2: 100%            Physical Exam   Constitutional: He is oriented to person, place, and time. He appears well-developed and well-nourished. No distress. HENT:   Head: Normocephalic and atraumatic. Head is without raccoon's eyes, without Hoover's sign and without laceration. Right Ear: Hearing, tympanic membrane, external ear and ear canal normal. No foreign bodies. Tympanic membrane is not bulging. No hemotympanum. Left Ear: Hearing, tympanic membrane, external ear and ear canal normal. No foreign bodies. Tympanic membrane is not bulging. No hemotympanum. Nose: Nose normal. No mucosal edema or rhinorrhea. Right sinus exhibits no maxillary sinus tenderness and no frontal sinus tenderness. Left sinus exhibits no maxillary sinus tenderness and no frontal sinus tenderness. Mouth/Throat: Uvula is midline, oropharynx is clear and moist and mucous membranes are normal. No tonsillar abscesses. Eyes: Pupils are equal, round, and reactive to light. Conjunctivae and EOM are normal. Right eye exhibits no discharge. Left eye exhibits no discharge. Neck: Normal range of motion. Neck supple. Cardiovascular: Normal rate, regular rhythm and normal heart sounds. Exam reveals no gallop and no friction rub. No murmur heard. Regular rate and rhythm. No murmurs, gallops, rubs, or clicks.      Pulmonary/Chest: Effort normal and breath sounds normal. No respiratory distress. He has no wheezes. He has no rales. No stridor or wheezes. No accessory muscle usage. No nasal flaring. Breath Sounds equal bilaterally. Abdominal: Soft. Bowel sounds are normal. He exhibits no distension. There is no tenderness. There is no rebound and no guarding. No abdominal Bruits. No pulsatile mass. No abdominal scars. Active bowel sounds. Musculoskeletal: Normal range of motion. He exhibits no edema, tenderness or deformity. Neurological: He is alert and oriented to person, place, and time. Skin: He is not diaphoretic. Nursing note and vitals reviewed. MDM  Number of Diagnoses or Management Options  Diagnosis management comments: Patient does have DVT of the right lower extremity. Concern for possible pulmonary embolus. Waiting on CTA results. Patient was signed out to mid-level provider Frances Perez.   Mariano Reyes PA-C         Procedures

## 2019-10-12 NOTE — ED NOTES
5:07 PM  Change of shift. Care of patient taken over from [facility]; H&P reviewed. Awaiting CT scan.     5:39 PM  Requested BP recheck- very elevated- Clonidine ordered   Last time he took Eliquis was this morning  Requested pt be placed in CORE bed w/ tele monitor given cards hx, PE hx, and aortic root dilation hx. LABORATORY TESTS:  Recent Results (from the past 12 hour(s))   POC CHEM8    Collection Time: 10/12/19  4:37 PM   Result Value Ref Range    Calcium, ionized (POC) 1.24 1.12 - 1.32 mmol/L    Sodium (POC) 139 136 - 145 mmol/L    Potassium (POC) 3.9 3.5 - 5.1 mmol/L    Chloride (POC) 97 (L) 98 - 107 mmol/L    CO2 (POC) 31 21 - 32 mmol/L    Anion gap (POC) 15 10 - 20 mmol/L    Glucose (POC) 89 65 - 100 mg/dL    BUN (POC) 17 9 - 20 mg/dL    Creatinine (POC) 1.0 0.6 - 1.3 mg/dL    GFRAA, POC >60 >60 ml/min/1.73m2    GFRNA, POC >60 >60 ml/min/1.73m2    Hematocrit (POC) 48 36.6 - 50.3 %    Comment Comment Not Indicated.      EKG, 12 LEAD, INITIAL    Collection Time: 10/12/19  6:07 PM   Result Value Ref Range    Ventricular Rate 77 BPM    Atrial Rate 77 BPM    P-R Interval 124 ms    QRS Duration 112 ms    Q-T Interval 366 ms    QTC Calculation (Bezet) 414 ms    Calculated P Axis 59 degrees    Calculated R Axis -41 degrees    Calculated T Axis 55 degrees    Diagnosis       Normal sinus rhythm  Left atrial enlargement  Left axis deviation  When compared with ECG of 02-OCT-2019 13:44,  premature ventricular complexes are no longer present     PTT    Collection Time: 10/12/19  7:11 PM   Result Value Ref Range    aPTT 27.6 22.1 - 32.0 sec    aPTT, therapeutic range     58.0 - 77.0 SECS   CBC WITH AUTOMATED DIFF    Collection Time: 10/12/19  7:20 PM   Result Value Ref Range    WBC 3.7 (L) 4.1 - 11.1 K/uL    RBC 5.13 4.10 - 5.70 M/uL    HGB 13.5 12.1 - 17.0 g/dL    HCT 41.8 36.6 - 50.3 %    MCV 81.5 80.0 - 99.0 FL    MCH 26.3 26.0 - 34.0 PG    MCHC 32.3 30.0 - 36.5 g/dL    RDW 14.5 11.5 - 14.5 %    PLATELET 036 637 - 400 K/uL    MPV 9.7 8.9 - 12.9 FL    NRBC 0.0 0  WBC    ABSOLUTE NRBC 0.00 0.00 - 0.01 K/uL    NEUTROPHILS 48 32 - 75 %    LYMPHOCYTES 37 12 - 49 %    MONOCYTES 12 5 - 13 %    EOSINOPHILS 2 0 - 7 %    BASOPHILS 1 0 - 1 %    IMMATURE GRANULOCYTES 0 0.0 - 0.5 %    ABS. NEUTROPHILS 1.8 1.8 - 8.0 K/UL    ABS. LYMPHOCYTES 1.4 0.8 - 3.5 K/UL    ABS. MONOCYTES 0.4 0.0 - 1.0 K/UL    ABS. EOSINOPHILS 0.1 0.0 - 0.4 K/UL    ABS. BASOPHILS 0.0 0.0 - 0.1 K/UL    ABS. IMM. GRANS. 0.0 0.00 - 0.04 K/UL    DF AUTOMATED     SAMPLES BEING HELD    Collection Time: 10/12/19  7:20 PM   Result Value Ref Range    SAMPLES BEING HELD 1RED 1PST     COMMENT        Add-on orders for these samples will be processed based on acceptable specimen integrity and analyte stability, which may vary by analyte. IMAGING RESULTS:  CTA CHEST W OR W WO CONT   Final Result   IMPRESSION: Acute PE, as described above.              MEDICATIONS GIVEN:  Medications   hydrALAZINE (APRESOLINE) 20 mg/mL injection 10 mg (has no administration in time range)   sodium chloride (NS) flush 5-40 mL (10 mL IntraVENous Given 10/12/19 2200)   sodium chloride (NS) flush 5-40 mL (has no administration in time range)   naloxone (NARCAN) injection 0.4 mg (has no administration in time range)   bisacodyl (DULCOLAX) tablet 5 mg (has no administration in time range)   ondansetron (ZOFRAN) injection 4 mg (has no administration in time range)   oxyCODONE IR (ROXICODONE) tablet 5 mg (has no administration in time range)   heparin 25,000 units in D5W 250 ml infusion (15 Units/kg/hr × 94.3 kg IntraVENous New Bag 10/12/19 0410)   lisinopril/hydroCHLOROthiazide(PRINZIDE/ZESTORETIC) 20/25 mg (has no administration in time range)   sodium chloride 0.9 % bolus infusion 100 mL (0 mL IntraVENous IV Completed 10/12/19 1810)   iopamidol (ISOVUE-370) 76 % injection 100 mL (80 mL IntraVENous Given 10/12/19 1712)   sodium chloride (NS) flush 10 mL (10 mL IntraVENous Given 10/12/19 1712) cloNIDine HCl (CATAPRES) tablet 0.2 mg (0.2 mg Oral Given 10/12/19 4566)   LORazepam (ATIVAN) injection 1 mg (1 mg IntraVENous Given 10/12/19 1252)   heparin (porcine) injection 7,550 Units (7,550 Units IntraVENous Given 10/12/19 9131)       IMPRESSION:  1. Acute pulmonary embolism without acute cor pulmonale, unspecified pulmonary embolism type (HCC)    2. Elevated factor VIII level    3. History of DVT (deep vein thrombosis)    4. Elevated blood pressure reading        PLAN:  Hospitalist Robby for Admission  6:20 PM    ED Room Number: 359/01  Patient Name and age:  Alexus Fortune 59 y.o.  male  Working Diagnosis:   1. Acute pulmonary embolism without acute cor pulmonale, unspecified pulmonary embolism type (HCC)    2. Elevated factor VIII level    3. History of DVT (deep vein thrombosis)    4. Elevated blood pressure reading      Readmission: yes  Isolation Requirements:  no  Recommended Level of Care:  telemetry  Code Status:  Full Code  Department:[facility] Adult ED - 21   Other:  Pt w/ hx of PE and elevated factor VII level- recent cervical spine surgery- had stopped his Eliquis pre-op then restarted on Wed ot this week.  Increased SOB/ pain like past PE_ new PE while on eliquis- also accelerated HTN- given clonidine- needs BP recheck- also high anxiety level

## 2019-10-13 LAB
ANION GAP SERPL CALC-SCNC: 5 MMOL/L (ref 5–15)
APTT PPP: 61.5 SEC (ref 22.1–32)
APTT PPP: 64.3 SEC (ref 22.1–32)
APTT PPP: 97.1 SEC (ref 22.1–32)
ATRIAL RATE: 77 BPM
BUN SERPL-MCNC: 14 MG/DL (ref 6–20)
BUN/CREAT SERPL: 14 (ref 12–20)
CALCIUM SERPL-MCNC: 9.6 MG/DL (ref 8.5–10.1)
CALCULATED P AXIS, ECG09: 59 DEGREES
CALCULATED R AXIS, ECG10: -41 DEGREES
CALCULATED T AXIS, ECG11: 55 DEGREES
CHLORIDE SERPL-SCNC: 100 MMOL/L (ref 97–108)
CO2 SERPL-SCNC: 30 MMOL/L (ref 21–32)
COMMENT, HOLDF: NORMAL
CREAT SERPL-MCNC: 1.01 MG/DL (ref 0.7–1.3)
DIAGNOSIS, 93000: NORMAL
GLUCOSE SERPL-MCNC: 102 MG/DL (ref 65–100)
P-R INTERVAL, ECG05: 124 MS
POTASSIUM SERPL-SCNC: 3.7 MMOL/L (ref 3.5–5.1)
Q-T INTERVAL, ECG07: 366 MS
QRS DURATION, ECG06: 112 MS
QTC CALCULATION (BEZET), ECG08: 414 MS
SAMPLES BEING HELD,HOLD: NORMAL
SODIUM SERPL-SCNC: 135 MMOL/L (ref 136–145)
THERAPEUTIC RANGE,PTTT: ABNORMAL SECS (ref 58–77)
VENTRICULAR RATE, ECG03: 77 BPM

## 2019-10-13 PROCEDURE — 74011250637 HC RX REV CODE- 250/637: Performed by: HOSPITALIST

## 2019-10-13 PROCEDURE — 97165 OT EVAL LOW COMPLEX 30 MIN: CPT

## 2019-10-13 PROCEDURE — 97161 PT EVAL LOW COMPLEX 20 MIN: CPT

## 2019-10-13 PROCEDURE — 85730 THROMBOPLASTIN TIME PARTIAL: CPT

## 2019-10-13 PROCEDURE — 97530 THERAPEUTIC ACTIVITIES: CPT

## 2019-10-13 PROCEDURE — 65660000000 HC RM CCU STEPDOWN

## 2019-10-13 PROCEDURE — 80048 BASIC METABOLIC PNL TOTAL CA: CPT

## 2019-10-13 PROCEDURE — 74011250636 HC RX REV CODE- 250/636: Performed by: EMERGENCY MEDICINE

## 2019-10-13 PROCEDURE — 36415 COLL VENOUS BLD VENIPUNCTURE: CPT

## 2019-10-13 RX ADMIN — Medication 10 ML: at 14:14

## 2019-10-13 RX ADMIN — Medication 10 ML: at 07:24

## 2019-10-13 RX ADMIN — Medication 10 ML: at 22:00

## 2019-10-13 RX ADMIN — HYDROCHLOROTHIAZIDE: 25 TABLET ORAL at 08:20

## 2019-10-13 RX ADMIN — HEPARIN SODIUM AND DEXTROSE 12 UNITS/KG/HR: 10000; 5 INJECTION INTRAVENOUS at 08:23

## 2019-10-13 RX ADMIN — HEPARIN SODIUM AND DEXTROSE 12 UNITS/KG/HR: 10000; 5 INJECTION INTRAVENOUS at 22:06

## 2019-10-13 NOTE — PROGRESS NOTES
Hospitalist Progress Note    NAME: Emery Sequeira   :  1960   MRN:  [id number]       Assessment / Plan:  1. Acute PE - pt resumed maintenance eliquis on Wednesday. May need to be rebolused. Defer to oncology      25.0 - 29.9 Overweight / Body mass index is 26.53 kg/m². Code status: Full  Prophylaxis: heparin  Recommended Disposition: Home w/Family     Subjective:     Chief Complaint / Reason for Physician Visit  \"no cp or sob. \". Discussed with RN events overnight. Review of Systems:  Symptom Y/N Comments  Symptom Y/N Comments   Fever/Chills    Chest Pain     Poor Appetite    Edema     Cough    Abdominal Pain     Sputum    Joint Pain     SOB/LONG    Pruritis/Rash     Nausea/vomit    Tolerating PT/OT     Diarrhea    Tolerating Diet     Constipation    Other       Could NOT obtain due to:      Objective:     VITALS:   Last 24hrs VS reviewed since prior progress note. Most recent are:  Patient Vitals for the past 24 hrs:   Temp Pulse Resp BP SpO2   10/13/19 0747 99.2 °F (37.3 °C) 68 16 129/77 99 %   10/13/19 0002 98.8 °F (37.1 °C) (!) 54 14 130/79 98 %   10/13/19 0000  (!) 59      10/12/19 2034 97.5 °F (36.4 °C) 73 16 (!) 176/93 99 %   10/12/19 2000  (!) 54 15 104/62 94 %   10/12/19 1945  63 21 131/74 97 %   10/12/19 1930  65 15 149/77 97 %   10/12/19 1900  78 18 (!) 194/95 98 %   10/12/19 1845  74 23 (!) 209/101 98 %   10/12/19 1830  75 17 (!) 212/98 99 %   10/12/19 1815  80 16 (!) 237/100 99 %   10/12/19 1736    (!) 215/105    10/12/19 1736    (!) 215/111    10/12/19 1311 98.2 °F (36.8 °C) 74 17 (!) 212/104 100 %     No intake or output data in the 24 hours ending 10/13/19 1013     PHYSICAL EXAM:  General: WD, WN. Alert, cooperative, no acute distress    EENT:  EOMI. Anicteric sclerae. MMM  Resp:  CTA bilaterally, no wheezing or rales.   No accessory muscle use  CV:  Regular  rhythm,  No edema  GI:  Soft, Non distended, Non tender.  +Bowel sounds  Neurologic:  Alert and oriented X 3, normal speech,   Psych:   Good insight. Not anxious nor agitated  Skin:  No rashes. No jaundice    Reviewed most current lab test results and cultures  YES  Reviewed most current radiology test results   YES  Review and summation of old records today    NO  Reviewed patient's current orders and MAR    YES  PMH/SH reviewed - no change compared to H&P  ________________________________________________________________________  Care Plan discussed with:    Comments   Patient     Family      RN     Care Manager     Consultant                        Multidiciplinary team rounds were held today with , nursing, pharmacist and clinical coordinator. Patient's plan of care was discussed; medications were reviewed and discharge planning was addressed. ________________________________________________________________________  Total NON critical care TIME:  20   Minutes    Total CRITICAL CARE TIME Spent:   Minutes non procedure based      Comments   >50% of visit spent in counseling and coordination of care     ________________________________________________________________________  Rhoda Craft DO     Procedures: see electronic medical records for all procedures/Xrays and details which were not copied into this note but were reviewed prior to creation of Plan. LABS:  I reviewed today's most current labs and imaging studies.   Pertinent labs include:  Recent Labs     10/12/19  1920   WBC 3.7*   HGB 13.5   HCT 41.8        Recent Labs     10/13/19  0548   *   K 3.7      CO2 30   *   BUN 14   CREA 1.01   CA 9.6       Signed: Rhoda Craft DO

## 2019-10-13 NOTE — CONSULTS
[id number] [facility] Oncology at [address] [place] [address]  [phone]    Hematology / Oncology Consult    Reason for Visit:   Dogulas Shah is a 59 y.o. male who is seen in consultation at the request of Dr. Jesu Saleh for evaluation of PE. History of Present Illness:   Douglas Shah is a 59 y.o. 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 Pt recently underwent workup in ER on 10/2/19 for SOB and chest CTA at that time was negative for PE. On 10/3/19, he discontinued his Eliquis per recommendations from neurosurgery in preparation for cervical fusion surgery, which was done on 10/7/19. He then restarted Eliquis on 10/9. However, he developed SOB, came into ER, was found to have BRYAN and LLL PE as well as RLE DVT. Pt was started on heparin infusion. He denies any pleuritic CP. He states his SOB is already improving. No melena/hematuria. 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 He completed approx 1 yr of Warfarin at that time. In 2012, he was found to have chronic DVT. In 2014, he was found to have elevated Factor VIII level and was seen by Dr. Freddy Puri for evaluation. Recommendation was for lifelong anticoagulation. However, pt was resistant and instead started on ASA 81mg/d. He then had another event with unprovoked BLE DVT and bilateral PE in 5/2018. He was started on Eliquis with recommendations to remain on lifelong anticoagulation. Past Medical History:   Diagnosis Date    Bilateral pulmonary embolism ([address] 5/22/2018    BPH (benign prostatic hyperplasia)     DVT, bilateral lower limbs ([address] 5/22/2018    Gout     HTN (hypertension)     Hypertension     Neutropenia ([address]     Prediabetes     Thromboemb[facility]us ([facility]     2009 & 2012    Thromboembolus [facility])     2009 & 2012. 2014 elevated D dimer.        Past Surgical History:   Procedure Laterality Date    HX COLONOSCOPY  2012    HX COLONOSCOPY  2014    HX HEENT  2015    ORAL SURGERY X 3    HX HEENT  04/2015    SINUS SURGERY    HX KNEE ARTHROSCOPY Left       Social History     Tobacco Use    Smoking status: Never Smoker    Smokeless tobacco: Never Used   Substance Use Topics    Alcohol use: No      Family History   Problem Relation Age of Onset    Cancer Mother         multiple myeloma    Diabetes Mother     Hypertension Mother     Stroke Mother     Cancer Father         pancreatic    Heart Disease Father     Hypertension Father     No Known Problems Brother     Hypertension Brother         Resolved    Other Brother         Sarcoid    No Known Problems Son     No Known Problems Daughter     Anesth Problems Neg Hx      Current Facility-Administered Medications   Medication Dose Route Frequency    hydrALAZINE (APRESOLINE) 20 mg/mL injection 10 mg  10 mg IntraVENous Q6H PRN    sodium chloride (NS) flush 5-40 mL  5-40 mL IntraVENous Q8H    sodium chloride (NS) flush 5-40 mL  5-40 mL IntraVENous PRN    naloxone (NARCAN) injection 0.4 mg  0.4 mg IntraVENous PRN    bisacodyl (DULCOLAX) tablet 5 mg  5 mg Oral DAILY PRN    ondansetron (ZOFRAN) injection 4 mg  4 mg IntraVENous Q4H PRN    oxyCODONE IR (ROXICODONE) tablet 5 mg  5 mg Oral Q4H PRN    heparin 25,000 units in D5W 250 ml infusion  15-36 Units/kg/hr IntraVENous TITRATE    lisinopril/hydroCHLOROthiazide(PRINZIDE/ZESTORETIC) 20/25 mg   Oral DAILY      Allergies   Allergen Reactions    Levaquin [Levofloxacin] Rash        Review of Systems: A complete review of systems was obtained, negative except as described above.             Physical Exam:     Visit Vitals  /77 (BP 1 Location: Left arm, BP Patient Position: Sitting)   Pulse 68   Temp 99.2 °F (37.3 °C)   Resp 16   Wt 201 lb 1 oz (91.2 kg)   SpO2 99%   BMI 26.53 kg/m²     ECOG PS: 0  General: No distress  Eyes: PERRLA, anicteric sclerae  HENT: Atraumatic with normal appearance of ears and nose; OP clear  Neck: Supple; no thyromegaly. Wearing neck brace  Lymphatic: No cervical, supraclavicular, or inguinal adenopathy  Respiratory: CTAB, normal respiratory effort  CV: Normal rate, regular rhythm, no murmurs, no peripheral edema  GI: Soft, nontender, nondistended, no masses, no hepatomegaly, no splenomegaly  MS: Normal gait and station. Digits without clubbing or cyanosis. RLE with TTP in medial calf. Skin: No rashes, ecchymoses, or petechiae. Normal temperature, turgor, and texture. Neuro/Psych: Alert, oriented, appropriate affect, normal judgment/insight      Results:     Lab Results   Component Value Date/Time    WBC 3.7 (L) 10/12/2019 07:20 PM    HGB 13.5 10/12/2019 07:20 PM    HCT 41.8 10/12/2019 07:20 PM    PLATELET 148 04/33/8211 07:20 PM    MCV 81.5 10/12/2019 07:20 PM    ABS. NEUTROPHILS 1.8 10/12/2019 07:20 PM    Hematocrit (POC) 48 10/12/2019 04:37 PM     Lab Results   Component Value Date/Time    Sodium 135 (L) 10/13/2019 05:48 AM    Potassium 3.7 10/13/2019 05:48 AM    Chloride 100 10/13/2019 05:48 AM    CO2 30 10/13/2019 05:48 AM    Glucose 102 (H) 10/13/2019 05:48 AM    BUN 14 10/13/2019 05:48 AM    Creatinine 1.01 10/13/2019 05:48 AM    GFR est AA >60 10/13/2019 05:48 AM    GFR est non-AA >60 10/13/2019 05:48 AM    Calcium 9.6 10/13/2019 05:48 AM    Sodium (POC) 139 10/12/2019 04:37 PM    Potassium (POC) 3.9 10/12/2019 04:37 PM    Chloride (POC) 97 (L) 10/12/2019 04:37 PM    Glucose (POC) 89 10/12/2019 04:37 PM    BUN (POC) 17 10/12/2019 04:37 PM    Creatinine (POC) 1.0 10/12/2019 04:37 PM    Calcium, ionized (POC) 1.24 10/12/2019 04:37 PM     Lab Results   Component Value Date/Time    Bilirubin, total 1.1 (H) 10/02/2019 01:39 PM    ALT (SGPT) 33 10/02/2019 01:39 PM    AST (SGOT) 40 (H) 10/02/2019 01:39 PM    Alk.  phosphatase 68 10/02/2019 01:39 PM    Protein, total 8.4 (H) 10/02/2019 01:39 PM    Albumin 3.8 10/02/2019 01:39 PM    Globulin 4.6 (H) 10/02/2019 01:39 PM       Chest CTA 10/12/19:  Direct comparison is made to prior CT dated October 2, 2019. Chest:   CTPA: There is new thrombus within the distal left lower lobar pulmonary artery and extending in to several segmental left lower lobe pulmonary arteries, not present on prior CT dated October 2, 2019. Is also new thrombus within a left upper lobe segmental pulmonary artery. Thoracic aorta is normal in course and caliber and there is no aortic dissection. Lymph nodes: There is no axillary, mediastinal or hilar lymphadenopathy. Heart: The heart is normal in the size and there is no pericardial effusion. Lungs: There is very mild bibasilar atelectasis. Pleura: There is no pleural fluid. Bones: No lytic or sclerotic osseous lesion is visualized. Upper abdomen: The visualized upper abdominal structures are normal.  IMPRESSION: Acute PE, as described above. BLE Venous dopplers 10/12/19:  · There is evidence of partially occlusive thrombosis of the right popliteal and gastroc veins. The ultrasound appearance suggests a more chronic than acute process. · There is evidence of superficial greater saphenous vein thrombosis extending from above the ankle to the upper calf and suggests a more acute than chronic process.     Assessment and Recommendations:   Geovanna Montalvo is a 59 y.o. male     1. Recurrent VTE / Pulmonary emboli: In BRYAN and LLL, new since 10/2/19 along acute greater saphenous vein thrombosis. This event was likely due to accumulation of risks including elevated Factor VIII level, lack of anticoagulation, and recent cervical fusion surgery. I would not call this an Eliquis failure and would restart pt on therapeutic Eliquis. Given acute thrombosis, I recommend starting at Eliquis 10mg bid x 7 days, followed by 5mg bid. In the future, patient should consult with hematologist prior to any invasive surgeries. He will likely need Lovenox bridge if taken off of Eliquis for procedures/surgeries.    -- Restart Eliquis prior to discharge home. Would start with loading dose of 10mg bid x 7 days, followed by by 5mg bid. -- Needs to consult with hematologist prior to future invasive procedures or surgeries. 2. Elevated Factor VIII level: Given this finding as well as recurrent unprovoked VTE, pt will need to remain on lifelong anticoagulation. 3. Elevated BP / HTN: Now better controlled on Lisinopril/HCTZ. 4. Cervical stenosis: s/p cervical fusion surgery by NSG and now in neck brace. Follow up as needed.     Signed By: Santo Waterman MD     October 13, 2019

## 2019-10-13 NOTE — ED NOTES
Pt up with tech .  Previous rn got a  Verbal order for pt to go up without a monitor and she also called report

## 2019-10-13 NOTE — ROUTINE PROCESS
Bedside shift change report given to Abel Guadarrama RN (oncoming nurse) by Darylene Munch, RN (offgoing nurse). Report included the following information SBAR, Kardex, Intake/Output, Recent Results and Cardiac Rhythm Sinus Dana Bernstein.

## 2019-10-13 NOTE — ROUTINE PROCESS
TRANSFER - OUT REPORT: 
 
Verbal report given to Adrienne(name) on Milan Pool  being transferred to (unit) for routine progression of care Report consisted of patients Situation, Background, Assessment and  
Recommendations(SBAR). Information from the following report(s) ED Summary and Recent Results was reviewed with the receiving nurse. Lines:  
Peripheral IV 10/12/19 Left Antecubital (Active) Site Assessment Clean, dry, & intact 10/12/2019  4:35 PM  
Phlebitis Assessment 0 10/12/2019  4:35 PM  
Infiltration Assessment 0 10/12/2019  4:35 PM  
Dressing Status Clean, dry, & intact 10/12/2019  4:35 PM  
Dressing Type Topical skin adhesive;Transparent 10/12/2019  4:35 PM  
Hub Color/Line Status Pink;Capped;Flushed;Patent 10/12/2019  4:35 PM  
Action Taken Blood drawn 10/12/2019  4:35 PM  
   
Peripheral IV 10/12/19 Right Antecubital (Active) Site Assessment Clean, dry, & intact 10/12/2019  8:03 PM  
Phlebitis Assessment 0 10/12/2019  8:03 PM  
Infiltration Assessment 0 10/12/2019  8:03 PM  
Dressing Status Clean, dry, & intact 10/12/2019  8:03 PM  
Dressing Type Transparent 10/12/2019  8:03 PM  
Alcohol Cap Used No 10/12/2019  8:03 PM  
  
 
Opportunity for questions and clarification was provided. Patient transported with: 
 [employer]

## 2019-10-13 NOTE — PROGRESS NOTES
OCCUPATIONAL THERAPY EVALUATION/DISCHARGE  Patient: Shahriar Heck (73 y.o. male)  Date: 10/13/2019  Primary Diagnosis: PE (pulmonary thromboembolism) ([facility] [address] [I26.99]       Precautions:   Fall, Spinal(cervical collar at all times)    ASSESSMENT  Based on the objective data described below, the patient presents s/p PE(pulmonary thromboembolism) with recent cervical surgery. He presents at overall set-up/supervision to min A level for ADLs (wife provides assistance per pts request/comfort level for some aspects of self-care). Pt is able to do ADLs, however wife does assist when needed. Education provided on long handled dressing equipment for LB self care, and ADLs (shower equipment), pt/wife verbalized understanding and are content with current set-up/situation. Demonstrated good understanding of cervical precautions and activity modifications. Provided extensive education on various activity modifications/genreal safety awareness due to cervical restrictions. Demonstrated good understanding as indicated by reverse verbalization.s No acute OT needs at this time. Current Level of Function (ADLs/self-care): min A - set-up/supervision. Pts wife assists with ADLs. Provided education on long handled dressing equipment, however pt/wife content with current set-up. Functional Outcome Measure: The patient scored 70/100 on the Barthel Index outcome measure . Other factors to consider for discharge: wife is available to assist with ADLs. Good adherance/understanding of cervical precautions. PLAN :  Recommend with nursing patient to complete as able in order to maintain strength, endurance and independence: ADLs with supervision/setup, OOB to chair 3x/day and mobilizing to the bathroom for toileting with supervision assist. Thank you for your assistance.        Recommendation for discharge: (in order for the patient to meet his/her long term goals)  No skilled occupational therapy/ follow up rehabilitation needs identified at this time. This discharge recommendation:  Has not yet been discussed the attending provider and/or case management    Equipment recommendations for successful discharge: shower chair (if desired)       SUBJECTIVE:   Patient stated I'm just going to take my time getting better.     OBJECTIVE DATA SUMMARY:   HISTORY:   Past Medical History:   Diagnosis Date    Bilateral pulmonary embolism ([facility] [address] 5/22/2018    BPH (benign prostatic hyperplasia)     DVT, bilateral lower limbs ([facility] [address] 5/22/2018    Gout     HTN (hypertension)     Hypertension     Neutropenia ([facility] [address]     Prediabetes     [facility] ([facility]     2009 & 2012    Thromboembolus [facility])     2009 & 2012. 2014 elevated D dimer. Past Surgical History:   Procedure Laterality Date    HX COLONOSCOPY  2012    HX COLONOSCOPY  2014    HX HEENT  2015    ORAL SURGERY X 3    HX HEENT  04/2015    SINUS SURGERY    HX KNEE ARTHROSCOPY Left        Prior Level of Function/Environment/Context: MOD I/I  Expanded or extensive additional review of patient history:   Home Situation  Home Environment: Private residence  # Steps to Enter: 1  One/Two Story Residence: Other (Comment)(Tri - level)  # of Interior Steps: 17  Living Alone: No  Support Systems: Spouse/Significant Other/Partner  Patient Expects to be Discharged to[de-identified] Private residence  Current DME Used/Available at Home: (reacher)    Hand dominance: Right    EXAMINATION OF PERFORMANCE DEFICITS:  Cognitive/Behavioral Status:  Neurologic State: Alert  Orientation Level: Oriented X4  Cognition: Appropriate decision making; Appropriate safety awareness; Follows commands; Appropriate for age attention/concentration  Perception: Appears intact  Perseveration: No perseveration noted  Safety/Judgement: Awareness of environment;Good awareness of safety precautions; Home safety    Skin: no issues noted    Edema: none observed    Hearing:   Auditory  Auditory Impairment: None    Vision/Perceptual: Diplopia: Yes  - endorses intermittent double vision. No a new issue       Corrective Lenses: Glasses    Range of Motion:    AROM: Within functional limits                         Strength:    Strength: Within functional limits(compliant with spinal precautions - no lifting . 5 lbs)                Coordination:  Coordination: Within functional limits  Fine Motor Skills-Upper: Left Intact; Right Intact(endorses soem tingling/numbness in R hand)    Gross Motor Skills-Upper: Left Intact; Right Intact    Tone & Sensation:    Tone: Normal  Sensation: Intact                      Balance:  Sitting: Intact; Without support  Sitting - Static: Normal   Sitting - Dynamic: Good (unsupported)  Standing: Intact; Without support    Functional Mobility and Transfers for ADLs:  Bed Mobility:  Rolling: Modified independent  Supine to Sit: Modified independent  Sit to Supine: Modified independent  Scooting: Modified independent    Transfers:  Sit to Stand: Independent  Stand to Sit: Independent  Bed to Chair: Independent  Bathroom Mobility: Modified independent  Toilet Transfer : Modified independent    ADL Assessment:  Feeding: Setup    Oral Facial Hygiene/Grooming: Minimum assistance    Bathing: Minimum assistance    Upper Body Dressing: Setup    Lower Body Dressing: Minimum assistance    Toileting: Modified independent                ADL Intervention and task modifications:                                     Cognitive Retraining  Safety/Judgement: Awareness of environment;Good awareness of safety precautions; Home safety      Functional Measure:  Barthel Index:    Bathin  Bladder: 10  Bowels: 10  Groomin  Dressin  Feeding: 10  Mobility: 10  Stairs: 5  Toilet Use: 10  Transfer (Bed to Chair and Back): 10  Total: 70/100        The Barthel ADL Index: Guidelines  1. The index should be used as a record of what a patient does, not as a record of what a patient could do.   2. The main aim is to establish degree of independence from any help, physical or verbal, however minor and for whatever reason. 3. The need for supervision renders the patient not independent. 4. A patient's performance should be established using the best available evidence. Asking the patient, friends/relatives and nurses are the usual sources, but direct observation and common sense are also important. However direct testing is not needed. 5. Usually the patient's performance over the preceding 24-48 hours is important, but occasionally longer periods will be relevant. 6. Middle categories imply that the patient supplies over 50 per cent of the effort. 7. Use of aids to be independent is allowed. Kira Davis., Barthel, DLeniW. ([id number]). Functional evaluation: the Barthel Index. [address] [facility] (14)2. KIMMY Sanford, Ciarra Go, Kenny Perez., [place], [address] (1999). Measuring the change indisability after inpatient rehabilitation; comparison of the responsiveness of the Barthel Index and Functional Desha Measure. Journal of Neurology, Neurosurgery, and Psychiatry, 66(4), 061-449. Devora Buchanan, KATERINE, IVELISSE Kan, & Octavio Tracy, M.A. (2004.) Assessment of post-stroke quality of life in cost-effectiveness studies: The usefulness of the Barthel Index and the EuroQoL-5D.  Quality of Life Research, 15, 228-44         Occupational Therapy Evaluation Charge Determination   History Examination Decision-Making   LOW Complexity : Brief history review  LOW Complexity : 1-3 performance deficits relating to physical, cognitive , or psychosocial skils that result in activity limitations and / or participation restrictions  LOW Complexity : No comorbidities that affect functional and no verbal or physical assistance needed to complete eval tasks       Based on the above components, the patient evaluation is determined to be of the following complexity level: LOW   Pain Rating:  None reported    Activity Tolerance:   Good  Please refer to the flowsheet for vital signs taken during this treatment.     After treatment patient left in no apparent distress:    Caregiver / family present and sitting EOB    COMMUNICATION/EDUCATION:   The patients plan of care was discussed with: Physical Therapist.      Thank you for this referral.  Christophe Lyons OT  Time Calculation: 16 mins

## 2019-10-13 NOTE — PROGRESS NOTES
PHYSICAL THERAPY EVALUATION/DISCHARGE  Patient: Shabnam Mehta (47 y.o. male)  Date: 10/13/2019  Primary Diagnosis: PE (pulmonary thromboembolism) ([facility] [address] [I26.99]       Precautions:  Fall, Spinal(cervical collar at all times)      ASSESSMENT  Based on the objective data described below, the patient presents at baseline - independent and safe in functional mobility without devices, recently discharged s/p cervical surgery - independent and compliant with spine precautions - neck brace in place. No acute PT needs identified - up ad candida on unit with supervision of family/staff. Pt admitted for PE. Functional Outcome Measure: The patient scored 28/28 on the Tinetti outcome measure which is indicative of low fall risk. Other factors to consider for discharge: supportive wife present who is supervising when amb in hallway or to bathroom     Further skilled acute physical therapy is not indicated at this time. PLAN :  Recommendation for discharge: (in order for the patient to meet his/her long term goals)  No skilled physical therapy/ follow up rehabilitation needs identified at this time. This discharge recommendation:  Has not yet been discussed the attending provider and/or case management    Equipment recommendations for successful discharge: patient owns DME required for discharge - using reacher appropriately to follow spine precautions       SUBJECTIVE:   Patient stated I've been doing great since my neck surgery - no problems at home.     OBJECTIVE DATA SUMMARY:   HISTORY:    Past Medical History:   Diagnosis Date    Bilateral pulmonary embolism ([address] 5/22/2018    BPH (benign prostatic hyperplasia)     DVT, bilateral lower limbs ([address] 5/22/2018    Gout     HTN (hypertension)     Hypertension     Neutropenia ([address]     Prediabetes     Thromboembolus ([facility] [address]     2009 & 2012    Thromboembolus [facility])     2009 & 2012. 2014 elevated D dimer.       Past Surgical History:   Procedure Laterality Date    HX COLONOSCOPY  2012    HX COLONOSCOPY  2014    HX HEENT  2015    ORAL SURGERY X 3    HX HEENT  04/2015    SINUS SURGERY    HX KNEE ARTHROSCOPY Left        Prior level of function: Independent within cervical neck precautions  Personal factors and/or comorbidities impacting plan of care: none noted    Home Situation  Home Environment: Private residence  # Steps to Enter: 1  One/Two Story Residence: Other (Comment)(Tri - level)  # of Interior Steps: 17  Living Alone: No  Support Systems: Spouse/Significant Other/Partner  Patient Expects to be Discharged to[de-identified] Private residence  Current DME Used/Available at Home: (reacher)    EXAMINATION/PRESENTATION/DECISION MAKING:   Critical Behavior:  Neurologic State: Alert  Orientation Level: Oriented X4  Cognition: Appropriate decision making, Appropriate safety awareness, Follows commands, Appropriate for age attention/concentration  Safety/Judgement: Awareness of environment, Good awareness of safety precautions, Home safety  Hearing: Auditory  Auditory Impairment: None  Range Of Motion:  AROM: Within functional limits                       Strength:    Strength: Within functional limits(compliant with spinal precautions - no lifting . 5 lbs)                    Tone & Sensation:   Tone: Normal              Sensation: Intact               Coordination:  Coordination: Within functional limits  Functional Mobility:  Bed Mobility:  Rolling: Modified independent  Supine to Sit: Modified independent  Sit to Supine: Modified independent  Scooting: Modified independent  Transfers:  Sit to Stand: Independent  Stand to Sit: Independent  Stand Pivot Transfers: Independent     Bed to Chair: Independent              Balance:   Sitting: Intact; Without support  Sitting - Static: Normal   Sitting - Dynamic: Good (unsupported)  Standing: Intact; Without support  Ambulation/Gait Training:  Distance (ft): 250 Feet (ft)  Assistive Device: Other (comment)(none)  Ambulation - Level of Assistance: Supervision     Gait Description (WDL): Within defined limits                                    Functional Measure:  Tinetti test:    Sitting Balance: 1  Arises: 2  Attempts to Rise: 2  Immediate Standing Balance: 2  Standing Balance: 2  Nudged: 2  Eyes Closed: 1  Turn 360 Degrees - Continuous/Discontinuous: 1  Turn 360 Degrees - Steady/Unsteady: 1  Sitting Down: 2  Balance Score: 16 Balance total score  Indication of Gait: 1  R Step Length/Height: 1  L Step Length/Height: 1  R Foot Clearance: 1  L Foot Clearance: 1  Step Symmetry: 1  Step Continuity: 1  Path: 2  Trunk: 2  Walking Time: 1  Gait Score: 12 Gait total score  Total Score: 28/28 Overall total score         Tinetti Tool Score Risk of Falls  <19 = High Fall Risk  19-24 = Moderate Fall Risk  25-28 = Low Fall Risk  Tinetti ME. Performance-Oriented Assessment of Mobility Problems in Elderly Patients. [address] 64; [id number].  (Scoring Description: PT Bulletin Feb. 10, 1993)    Older adults: Julissa Hernandez et al, 2009; n = 1000 [facility] elderly evaluated with ABC, LISA, ADL, and IADL)  · Mean LISA score for males aged 69-68 years = 26.21(3.40)  · Mean LISA score for females age 69-68 years = 25.16(4.30)  · Mean LISA score for males over 80 years = 23.29(6.02)  · Mean LISA score for females over 80 years = 17.20(8.32)          Physical Therapy Evaluation Charge Determination   History Examination Presentation Decision-Making   LOW Complexity : Zero comorbidities / personal factors that will impact the outcome / POC LOW Complexity : 1-2 Standardized tests and measures addressing body structure, function, activity limitation and / or participation in recreation  LOW Complexity : Stable, uncomplicated  LOW Complexity : FOTO score of       Based on the above components, the patient evaluation is determined to be of the following complexity level: LOW   Activity Tolerance:   Good - up ad candida with wife/staff  Please refer to the flowsheet for vital signs taken during this treatment. After treatment patient left in no apparent distress:   Supine in bed    COMMUNICATION/EDUCATION:   The patients plan of care was discussed with: Occupational Therapist and Registered Nurse. Fall prevention education was provided and the patient/caregiver indicated understanding., Patient/family have participated as able in goal setting and plan of care. and Patient/family agree to work toward stated goals and plan of care.     Thank you for this referral.  Irvin Guzmán, PT   Time Calculation: 25 mins

## 2019-10-14 VITALS
HEART RATE: 68 BPM | SYSTOLIC BLOOD PRESSURE: 137 MMHG | WEIGHT: 201.5 LBS | TEMPERATURE: 99.6 F | BODY MASS INDEX: 26.58 KG/M2 | RESPIRATION RATE: 16 BRPM | OXYGEN SATURATION: 97 % | DIASTOLIC BLOOD PRESSURE: 91 MMHG

## 2019-10-14 PROCEDURE — 74011250637 HC RX REV CODE- 250/637: Performed by: NEUROMUSCULOSKELETAL MEDICINE & OMM

## 2019-10-14 PROCEDURE — 74011250637 HC RX REV CODE- 250/637: Performed by: HOSPITALIST

## 2019-10-14 RX ADMIN — HYDROCHLOROTHIAZIDE: 25 TABLET ORAL at 08:24

## 2019-10-14 RX ADMIN — Medication 10 ML: at 05:45

## 2019-10-14 RX ADMIN — APIXABAN 10 MG: 5 TABLET, FILM COATED ORAL at 08:24

## 2019-10-14 NOTE — DISCHARGE SUMMARY
Hospitalist Discharge Summary     Patient ID:  Alexus Fortune  [id number]  56 y.o.  1960  10/12/2019    PCP on record: Colonel Yvette MD    Admit date: 10/12/2019  Discharge date and time: 10/14/2019    DISCHARGE DIAGNOSIS:    1. Acute PE     2. Recent cspine surgery.       CONSULTATIONS:  IP CONSULT TO HOSPITALIST  IP CONSULT TO HEMATOLOGY    Excerpted HPI from H&P of Kristie San MD:  Alexus Fortune is a 59 y.o. 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 He is on eliquis at home.  Percy Fields said he had an episode of sob back to 10/2 and he was worked up in the emergency department on October 2 and had a normal work-up chest CTA. He was still on eliquis on that day. Then next day 10/3, he stopped his eliquis per recommendation of NS for his surgery. he was admitted to the hospital for cervical fusion on 10/7. His eliquis was off for 3 more days post-op per the NS recommendation and just restarted his ac on Wednesday 10/9. He noticed increasing  right lower extremity pain on post-op day 1. He feels increasing chest pain last 2 days so he came back to ER. In ER. Noticed bp high. Pt said \" I am having white coat syndrome\"     ______________________________________________________________________  DISCHARGE SUMMARY/HOSPITAL COURSE:  for full details see H&P, daily progress notes, labs, consult notes. The pt was monitored on iv heparin for 2 nights. He experienced no complications. He will be discharged after seeing hematology and with be placed on eliqus 10mg po bid x7 day and then 5mg po bid thereafter.      _______________________________________________________________________  Patient seen and examined by me on discharge day. Pertinent Findings:  Gen:    Not in distress  Chest: Clear lungs  CVS:   Regular rhythm.   No edema  Abd: Soft, not distended, not tender  Neuro:  Alert, nad  _______________________________________________________________________  DISCHARGE MEDICATIONS:   Current Discharge Medication List      CONTINUE these medications which have CHANGED    Details   apixaban (ELIQUIS) 5 mg tablet Take 1 Tab by mouth two (2) times a day. eliquis 10mg po bid x 7 days then 5mg po bid  Qty: 30 Tab, Refills: 0         CONTINUE these medications which have NOT CHANGED    Details   senna (SENNA) 8.6 mg tablet Take 2 Tabs by mouth daily. colchicine 0.6 mg tablet TAKE 2 AT THE FIRST SIGN OF FLARE - THEN 1 TAB 1HR LATER, THEN 1 TAB EVERY DAY TIL SYMPTOMS RESOLVE  Refills: 0      lisinopril-hydroCHLOROthiazide (PRINZIDE, ZESTORETIC) 20-25 mg per tablet Take 1 Tab by mouth daily. Qty: 90 Tab, Refills: 1    Associated Diagnoses: Hypertension, uncontrolled         STOP taking these medications       oxyCODONE IR (ROXICODONE) 5 mg immediate release tablet Comments:   Reason for Stopping:                 Patient Follow Up Instructions: Activity: Activity as tolerated  Diet: Regular Diet  Wound Care: None needed    Follow-up with pcp in 1 week.   Follow-up tests/labs none  Follow-up Information     Follow up With Specialties Details Why Contact Info    Johnathon Argueta, [address] [facility] [address]  [facility] [phone]          ________________________________________________________________    Risk of deterioration: High    Condition at Discharge:  Stable  __________________________________________________________________    Disposition  Home with family, no needs    ____________________________________________________________________    Code Status: Full Code  ___________________________________________________________________      Total time in minutes spent coordinating this discharge (includes going over instructions, follow-up, prescriptions, and preparing report for sign off to her PCP) :  31 minutes    Signed:  Joanne Reynolds DO

## 2019-10-14 NOTE — ROUTINE PROCESS
Bedside shift change report given to Rina Olmstead RN (oncoming nurse) by Giorgio Montez RN (offgoing nurse). Report included the following information SBAR, Kardex, Intake/Output, MAR, Recent Results and Cardiac Rhythm NSR.

## 2020-01-29 DIAGNOSIS — I10 HYPERTENSION, UNCONTROLLED: ICD-10-CM

## 2020-01-30 RX ORDER — LISINOPRIL AND HYDROCHLOROTHIAZIDE 20; 25 MG/1; MG/1
TABLET ORAL
Qty: 90 TAB | Refills: 1 | OUTPATIENT
Start: 2020-01-30

## 2020-02-14 DIAGNOSIS — I10 HYPERTENSION, UNCONTROLLED: ICD-10-CM

## 2020-02-15 RX ORDER — LISINOPRIL AND HYDROCHLOROTHIAZIDE 20; 25 MG/1; MG/1
TABLET ORAL
Qty: 90 TAB | Refills: 1 | Status: SHIPPED | OUTPATIENT
Start: 2020-02-15

## 2020-07-04 DIAGNOSIS — I10 HYPERTENSION, UNCONTROLLED: ICD-10-CM

## 2020-07-06 RX ORDER — LISINOPRIL AND HYDROCHLOROTHIAZIDE 20; 25 MG/1; MG/1
TABLET ORAL
Qty: 90 TAB | Refills: 1 | OUTPATIENT
Start: 2020-07-06

## 2020-08-06 ENCOUNTER — OFFICE VISIT (OUTPATIENT)
Dept: CARDIOLOGY CLINIC | Age: 60
End: 2020-08-06
Payer: COMMERCIAL

## 2020-08-06 ENCOUNTER — ANCILLARY PROCEDURE (OUTPATIENT)
Dept: CARDIOLOGY CLINIC | Age: 60
End: 2020-08-06
Payer: COMMERCIAL

## 2020-08-06 VITALS
DIASTOLIC BLOOD PRESSURE: 88 MMHG | BODY MASS INDEX: 26.64 KG/M2 | HEIGHT: 73 IN | SYSTOLIC BLOOD PRESSURE: 134 MMHG | WEIGHT: 201 LBS

## 2020-08-06 VITALS
DIASTOLIC BLOOD PRESSURE: 90 MMHG | WEIGHT: 201 LBS | BODY MASS INDEX: 26.64 KG/M2 | RESPIRATION RATE: 18 BRPM | HEIGHT: 73 IN | OXYGEN SATURATION: 98 % | SYSTOLIC BLOOD PRESSURE: 160 MMHG | HEART RATE: 63 BPM

## 2020-08-06 DIAGNOSIS — I10 ESSENTIAL HYPERTENSION: Primary | ICD-10-CM

## 2020-08-06 DIAGNOSIS — I77.810 DILATED AORTIC ROOT (HCC): ICD-10-CM

## 2020-08-06 DIAGNOSIS — Z86.711 HISTORY OF PULMONARY EMBOLUS (PE): ICD-10-CM

## 2020-08-06 DIAGNOSIS — R00.2 PALPITATIONS: ICD-10-CM

## 2020-08-06 DIAGNOSIS — I10 ESSENTIAL HYPERTENSION: ICD-10-CM

## 2020-08-06 DIAGNOSIS — Z86.718 HISTORY OF DVT (DEEP VEIN THROMBOSIS): ICD-10-CM

## 2020-08-06 LAB
ECHO AO ASC DIAM: 3.39 CM
ECHO AO ROOT DIAM: 3.86 CM
ECHO AV AREA PEAK VELOCITY: 4.14 CM2
ECHO AV AREA VTI: 4.26 CM2
ECHO AV AREA/BSA PEAK VELOCITY: 1.9 CM2/M2
ECHO AV AREA/BSA VTI: 2 CM2/M2
ECHO AV MEAN GRADIENT: 3.82 MMHG
ECHO AV PEAK GRADIENT: 6.17 MMHG
ECHO AV PEAK VELOCITY: 124.16 CM/S
ECHO AV VTI: 26.25 CM
ECHO EST RA PRESSURE: 3 MMHG
ECHO IVC PROX: 0.84 CM
ECHO LA AREA 4C: 20.45 CM2
ECHO LA MAJOR AXIS: 3.68 CM
ECHO LA MINOR AXIS: 1.71 CM
ECHO LA VOL 2C: 52.71 ML (ref 18–58)
ECHO LA VOL 4C: 61.79 ML (ref 18–58)
ECHO LA VOL BP: 59.23 ML (ref 18–58)
ECHO LA VOL/BSA BIPLANE: 27.47 ML/M2 (ref 16–28)
ECHO LA VOLUME INDEX A2C: 24.44 ML/M2 (ref 16–28)
ECHO LA VOLUME INDEX A4C: 28.65 ML/M2 (ref 16–28)
ECHO LV E' LATERAL VELOCITY: 9.23 CM/S
ECHO LV E' SEPTAL VELOCITY: 10.12 CM/S
ECHO LV EDV A2C: 91.45 ML
ECHO LV EDV A4C: 92.27 ML
ECHO LV EDV BP: 94.46 ML (ref 67–155)
ECHO LV EDV INDEX A4C: 42.8 ML/M2
ECHO LV EDV INDEX BP: 43.8 ML/M2
ECHO LV EDV NDEX A2C: 42.4 ML/M2
ECHO LV EJECTION FRACTION A2C: 71 PERCENT
ECHO LV EJECTION FRACTION A4C: 61 PERCENT
ECHO LV EJECTION FRACTION BIPLANE: 65.7 PERCENT (ref 55–100)
ECHO LV ESV A2C: 26.44 ML
ECHO LV ESV A4C: 36.37 ML
ECHO LV ESV BP: 32.45 ML (ref 22–58)
ECHO LV ESV INDEX A2C: 12.3 ML/M2
ECHO LV ESV INDEX A4C: 16.9 ML/M2
ECHO LV ESV INDEX BP: 15 ML/M2
ECHO LV INTERNAL DIMENSION DIASTOLIC: 4.48 CM (ref 4.2–5.9)
ECHO LV INTERNAL DIMENSION SYSTOLIC: 2.36 CM
ECHO LV IVSD: 1.12 CM (ref 0.6–1)
ECHO LV MASS 2D: 195.6 G (ref 88–224)
ECHO LV MASS INDEX 2D: 90.7 G/M2 (ref 49–115)
ECHO LV POSTERIOR WALL DIASTOLIC: 1.27 CM (ref 0.6–1)
ECHO LVOT DIAM: 2.43 CM
ECHO LVOT PEAK GRADIENT: 4.89 MMHG
ECHO LVOT PEAK VELOCITY: 110.58 CM/S
ECHO LVOT SV: 111.8 ML
ECHO LVOT VTI: 24.07 CM
ECHO MV A VELOCITY: 59.46 CM/S
ECHO MV AREA PHT: 4.98 CM2
ECHO MV E DECELERATION TIME (DT): 0.15 S
ECHO MV E VELOCITY: 66.68 CM/S
ECHO MV E/A RATIO: 1.12
ECHO MV E/E' LATERAL: 7.22
ECHO MV E/E' RATIO (AVERAGED): 6.91
ECHO MV E/E' SEPTAL: 6.59
ECHO MV PRESSURE HALF TIME (PHT): 0.04 S
ECHO RA MAJOR AXIS: 4.26 CM
ECHO RIGHT VENTRICULAR SYSTOLIC PRESSURE (RVSP): 30 MMHG
ECHO RV INTERNAL DIMENSION: 3.77 CM
ECHO RV TAPSE: 2.19 CM (ref 1.5–2)
ECHO TV REGURGITANT MAX VELOCITY: 259.62 CM/S
ECHO TV REGURGITANT PEAK GRADIENT: 27.41 MMHG
LA VOL DISK BP: 56.99 ML (ref 18–58)
LVOT MG: 2.71 MMHG

## 2020-08-06 PROCEDURE — 99214 OFFICE O/P EST MOD 30 MIN: CPT | Performed by: SPECIALIST

## 2020-08-06 PROCEDURE — 93306 TTE W/DOPPLER COMPLETE: CPT | Performed by: SPECIALIST

## 2020-08-06 NOTE — PROGRESS NOTES
HISTORY OF PRESENT ILLNESS  Tracy Munoz is a 62 y.o. male. He is seen in follow-up for hypertension as well as possible dilatation of the aortic root. This was noted previously on echocardiograms but not by CT scans. An echo done in the office today shows that his aortic root is not dilated at 3.4 cm. Since I last saw him he had neck surgery for cervical spinal stenosis. He has a history of deep venous thrombosis and pulmonary emboli and his Eliquis was stopped before and after the surgery and he had new pulmonary emboli to the left lung with shortness of breath. He has whitecoat hypertension. His blood pressure at home is always less than 120/80. He has been noted to have elevated cholesterol and is being referred by his primary care physician for a calcium score. His weight is down 8 pounds since I last saw him. HPI  Patient Active Problem List   Diagnosis Code    BPH (benign prostatic hyperplasia) N40.0    ED (erectile dysfunction) N52.9    Palpitations R00.2    Essential hypertension I10    Idiopathic gout M10.00    Dilated aortic root (HCC) I77.810    Abnormal ECG R94.31    Near syncope R55    Elevated factor VIII level R79.1    History of DVT (deep vein thrombosis) Z86.718    History of pulmonary embolus (PE) Z86.711    Idiopathic small and large fiber sensory neuropathy G60.8    Cervical stenosis of spinal canal M48.02    PE (pulmonary thromboembolism) (HCC) I26.99     Current Outpatient Medications   Medication Sig Dispense Refill    lisinopril-hydroCHLOROthiazide (PRINZIDE, ZESTORETIC) 20-25 mg per tablet TAKE 1 TABLET BY MOUTH  DAILY 90 Tab 1    apixaban (ELIQUIS) 5 mg tablet Take 1 Tab by mouth two (2) times a day. eliquis 10mg po bid x 7 days then 5mg po bid 30 Tab 0    senna (SENNA) 8.6 mg tablet Take 2 Tabs by mouth daily.       colchicine 0.6 mg tablet TAKE 2 AT THE FIRST SIGN OF FLARE - THEN 1 TAB 1HR LATER, THEN 1 TAB EVERY DAY TIL SYMPTOMS RESOLVE  0     Past Medical History:   Diagnosis Date    Bilateral pulmonary embolism ([address] 5/22/2018    BPH (benign prostatic hyperplasia)     DVT, bilateral lower limbs ([address] 5/22/2018    Gout     HTN (hypertension)     Hypertension     Neutropenia ([address]     Prediabetes     Thromboembolus ([address]     2009 & 2012    Thromboembolus [facility])     2009 & 2012. 2014 elevated D dimer. Past Surgical History:   Procedure Laterality Date    HX COLONOSCOPY  2012    HX COLONOSCOPY  2014    HX HEENT  2015    ORAL SURGERY X 3    HX HEENT  04/2015    SINUS SURGERY    HX KNEE ARTHROSCOPY Left        Review of Systems   Constitutional: Positive for weight loss. Respiratory: Positive for shortness of breath. Musculoskeletal: Positive for neck pain. All other systems reviewed and are negative. Visit Vitals  /90 (BP 1 Location: Left arm, BP Patient Position: Sitting)   Pulse 63   Resp 18   Ht 6' 1\" (1.854 m)   Wt 201 lb (91.2 kg)   SpO2 98%   BMI 26.52 kg/m²       Physical Exam   Constitutional: He is oriented to person, place, and time. He appears well-nourished. HENT:   Head: Atraumatic. Eyes: EOM are normal.   Neck: Neck supple. Cardiovascular: Normal rate, regular rhythm and normal heart sounds. Exam reveals no gallop and no friction rub. No murmur heard. Pulmonary/Chest: Breath sounds normal. He has no wheezes. He has no rales. Abdominal: Bowel sounds are normal. There is no abdominal tenderness. Musculoskeletal:         General: No edema. Neurological: He is oriented to person, place, and time. Skin: Skin is dry. Psychiatric: His behavior is normal.   Nursing note and vitals reviewed. ASSESSMENT and PLAN  His blood pressure slightly elevated today but I do believe it is normal when he is not in the doctor's office. I will continue him on this regimen. I encouraged him to send me the results of his upcoming calcium score. I will see him back in 1 year.   I do not think he really has a dilated ascending aorta and will not repeat the echo when I see him next.

## 2021-08-25 ENCOUNTER — OFFICE VISIT (OUTPATIENT)
Dept: CARDIOLOGY CLINIC | Age: 61
End: 2021-08-25
Payer: COMMERCIAL

## 2021-08-25 VITALS
WEIGHT: 200 LBS | HEART RATE: 70 BPM | BODY MASS INDEX: 26.51 KG/M2 | RESPIRATION RATE: 16 BRPM | OXYGEN SATURATION: 99 % | DIASTOLIC BLOOD PRESSURE: 90 MMHG | SYSTOLIC BLOOD PRESSURE: 160 MMHG | HEIGHT: 73 IN

## 2021-08-25 DIAGNOSIS — I77.810 DILATED AORTIC ROOT (HCC): Primary | ICD-10-CM

## 2021-08-25 DIAGNOSIS — Z86.711 HISTORY OF PULMONARY EMBOLUS (PE): ICD-10-CM

## 2021-08-25 DIAGNOSIS — Z86.718 HISTORY OF DVT (DEEP VEIN THROMBOSIS): ICD-10-CM

## 2021-08-25 DIAGNOSIS — R00.2 PALPITATIONS: ICD-10-CM

## 2021-08-25 DIAGNOSIS — I10 ESSENTIAL HYPERTENSION: ICD-10-CM

## 2021-08-25 PROCEDURE — 99214 OFFICE O/P EST MOD 30 MIN: CPT | Performed by: SPECIALIST

## 2021-08-25 NOTE — PROGRESS NOTES
HISTORY OF PRESENT ILLNESS  Dalton Teixeira is a 56 y.o. male. He has a history of deep venous thrombosis and pulmonary embolism and also mild dilatation of the ascending aorta. This was last checked a year ago and was 3.4 cm. He is on chronic anticoagulation. He has whitecoat hypertension and his blood pressure taken at home is always in the range of 120/70 even before taking his medications in the morning. HPI  Patient Active Problem List   Diagnosis Code    BPH (benign prostatic hyperplasia) N40.0    ED (erectile dysfunction) N52.9    Palpitations R00.2    Essential hypertension I10    Idiopathic gout M10.00    Dilated aortic root (HCC) I77.810    Abnormal ECG R94.31    Near syncope R55    Elevated factor VIII level R79.1    History of DVT (deep vein thrombosis) Z86.718    History of pulmonary embolus (PE) Z86.711    Idiopathic small and large fiber sensory neuropathy G60.8    Cervical stenosis of spinal canal M48.02    PE (pulmonary thromboembolism) (HCC) I26.99     Current Outpatient Medications   Medication Sig Dispense Refill    lisinopril-hydroCHLOROthiazide (PRINZIDE, ZESTORETIC) 20-25 mg per tablet TAKE 1 TABLET BY MOUTH  DAILY 90 Tab 1    apixaban (ELIQUIS) 5 mg tablet Take 1 Tab by mouth two (2) times a day. eliquis 10mg po bid x 7 days then 5mg po bid 30 Tab 0    senna (SENNA) 8.6 mg tablet Take 2 Tabs by mouth daily. (Patient not taking: Reported on 8/25/2021)      colchicine 0.6 mg tablet TAKE 2 AT THE FIRST SIGN OF FLARE - THEN 1 TAB 1HR LATER, THEN 1 TAB EVERY DAY TIL SYMPTOMS RESOLVE (Patient not taking: Reported on 8/25/2021)  0     Past Medical History:   Diagnosis Date    Bilateral pulmonary embolism ([address] 5/22/2018    BPH (benign prostatic hyperplasia)     DVT, bilateral lower limbs ([address] 5/22/2018    Gout     HTN (hypertension)     Hypertension     Neutropenia ([address]     Prediabetes     Thromboembolus ([facility] [address]     2009 & 2012    Thromboembolus [facility])     2009 & 2012. 2014 elevated D dimer. Past Surgical History:   Procedure Laterality Date    HX COLONOSCOPY  2012    HX COLONOSCOPY  2014    HX HEENT  2015    ORAL SURGERY X 3    HX HEENT  04/2015    SINUS SURGERY    HX KNEE ARTHROSCOPY Left        Review of Systems   Constitutional: Negative. HENT: Negative. Eyes: Negative. Respiratory: Negative. Cardiovascular: Negative. Gastrointestinal: Negative. Musculoskeletal: Negative. Skin: Negative. Neurological: Negative. Endo/Heme/Allergies: Negative. Psychiatric/Behavioral: Negative. Visit Vitals  BP (!) 160/90 (BP 1 Location: Left upper arm, BP Patient Position: Sitting, BP Cuff Size: Adult)   Pulse 70   Resp 16   Ht 6' 1\" (1.854 m)   Wt 200 lb (90.7 kg)   SpO2 99%   BMI 26.39 kg/m²       Physical Exam  Vitals and nursing note reviewed. Constitutional:       Appearance: Normal appearance. HENT:      Head: Normocephalic. Right Ear: External ear normal.      Left Ear: External ear normal.      Mouth/Throat:      Mouth: Mucous membranes are moist.   Eyes:      Extraocular Movements: Extraocular movements intact. Cardiovascular:      Rate and Rhythm: Normal rate and regular rhythm. Heart sounds: Normal heart sounds. Pulmonary:      Breath sounds: Normal breath sounds. Abdominal:      Palpations: Abdomen is soft. Musculoskeletal:         General: Normal range of motion. Cervical back: Normal range of motion. Skin:     General: Skin is warm. Neurological:      General: No focal deficit present. Mental Status: He is alert. Psychiatric:         Mood and Affect: Mood normal.         ASSESSMENT and PLAN  He is doing well and has good control of his blood pressure when taken at home. He needs another echocardiogram to assess the size of his a sending aorta and I will order this and we will call him regarding the results. If it is stable then I will plan to see him back in 1 year.   He will continue on chronic lifelong anticoagulation.

## 2021-08-27 ENCOUNTER — ANCILLARY PROCEDURE (OUTPATIENT)
Dept: CARDIOLOGY CLINIC | Age: 61
End: 2021-08-27
Payer: COMMERCIAL

## 2021-08-27 ENCOUNTER — TELEPHONE (OUTPATIENT)
Dept: CARDIOLOGY CLINIC | Age: 61
End: 2021-08-27

## 2021-08-27 VITALS — WEIGHT: 200 LBS | BODY MASS INDEX: 27.09 KG/M2 | HEIGHT: 72 IN

## 2021-08-27 DIAGNOSIS — I77.810 DILATED AORTIC ROOT (HCC): ICD-10-CM

## 2021-08-27 DIAGNOSIS — I77.810 DILATED AORTIC ROOT (HCC): Primary | ICD-10-CM

## 2021-08-27 LAB
ECHO AO ASC DIAM: 3.94 CM
ECHO AO ROOT DIAM: 4.07 CM
ECHO AV AREA PEAK VELOCITY: 4.51 CM2
ECHO AV AREA VTI: 4.52 CM2
ECHO AV AREA/BSA PEAK VELOCITY: 2.1 CM2/M2
ECHO AV AREA/BSA VTI: 2.1 CM2/M2
ECHO AV MEAN GRADIENT: 3.37 MMHG
ECHO AV PEAK GRADIENT: 5.15 MMHG
ECHO AV PEAK VELOCITY: 113.42 CM/S
ECHO AV VTI: 25.55 CM
ECHO IVC PROX: 2.15 CM
ECHO LA AREA 4C: 19.02 CM2
ECHO LA MAJOR AXIS: 4.08 CM
ECHO LA MINOR AXIS: 1.92 CM
ECHO LA VOL 2C: 79.69 ML (ref 18–58)
ECHO LA VOL 4C: 46.46 ML (ref 18–58)
ECHO LA VOL BP: 67.73 ML (ref 18–58)
ECHO LA VOL/BSA BIPLANE: 31.8 ML/M2 (ref 16–28)
ECHO LA VOLUME INDEX A2C: 37.41 ML/M2 (ref 16–28)
ECHO LA VOLUME INDEX A4C: 21.81 ML/M2 (ref 16–28)
ECHO LV E' LATERAL VELOCITY: 11.35 CM/S
ECHO LV E' SEPTAL VELOCITY: 10.46 CM/S
ECHO LV INTERNAL DIMENSION DIASTOLIC: 4.33 CM (ref 4.2–5.9)
ECHO LV INTERNAL DIMENSION SYSTOLIC: 2.57 CM
ECHO LV IVSD: 1.18 CM (ref 0.6–1)
ECHO LV MASS 2D: 198.1 G (ref 88–224)
ECHO LV MASS INDEX 2D: 93 G/M2 (ref 49–115)
ECHO LV POSTERIOR WALL DIASTOLIC: 1.32 CM (ref 0.6–1)
ECHO LVOT DIAM: 2.37 CM
ECHO LVOT PEAK GRADIENT: 5.39 MMHG
ECHO LVOT PEAK VELOCITY: 116.09 CM/S
ECHO LVOT SV: 115.4 ML
ECHO LVOT VTI: 26.22 CM
ECHO MV A VELOCITY: 42.92 CM/S
ECHO MV E DECELERATION TIME (DT): 236.97 MS
ECHO MV E VELOCITY: 62.77 CM/S
ECHO MV E/A RATIO: 1.46
ECHO MV E/E' LATERAL: 5.53
ECHO MV E/E' RATIO (AVERAGED): 5.77
ECHO MV E/E' SEPTAL: 6
ECHO PV MAX VELOCITY: 94.28 CM/S
ECHO PV PEAK INSTANTANEOUS GRADIENT SYSTOLIC: 3.56 MMHG
ECHO PV REGURGITANT MAX VELOCITY: 132.9 CM/S
ECHO TV REGURGITANT MAX VELOCITY: 276.86 CM/S
ECHO TV REGURGITANT PEAK GRADIENT: 30.66 MMHG
LA VOL DISK BP: 61.48 ML (ref 18–58)

## 2021-08-27 PROCEDURE — 93306 TTE W/DOPPLER COMPLETE: CPT | Performed by: SPECIALIST

## 2021-08-27 NOTE — TELEPHONE ENCOUNTER
----- Message from Roslyn Leonard MD sent at 8/27/2021  1:32 PM EDT -----  Echo shows aortic root may be slightly more dilated, doesn't appear large.   Think we should get CT angio to measure ascending aorta for accurate value

## 2021-08-27 NOTE — TELEPHONE ENCOUNTER
Called pt. Verified patient's identity with two identifiers. Notified pt of results and Dr. Pelon Guardado recommendation. Pt agreed. Notified him someone will call him to schedule, but also gave him # to central scheduling. Patient verbalized understanding and denied further questions or concerns.

## 2021-09-14 ENCOUNTER — HOSPITAL ENCOUNTER (OUTPATIENT)
Dept: CT IMAGING | Age: 61
Discharge: HOME | End: 2021-09-14
Attending: SPECIALIST
Payer: COMMERCIAL

## 2021-09-14 DIAGNOSIS — I77.810 DILATED AORTIC ROOT (HCC): ICD-10-CM

## 2021-09-14 PROCEDURE — 71275 CT ANGIOGRAPHY CHEST: CPT

## 2021-09-14 PROCEDURE — 74011000636 HC RX REV CODE- 636: Performed by: SPECIALIST

## 2021-09-14 RX ADMIN — IOPAMIDOL 100 ML: 755 INJECTION, SOLUTION INTRAVENOUS at 08:00

## 2021-09-15 ENCOUNTER — PATIENT MESSAGE (OUTPATIENT)
Dept: CARDIOLOGY CLINIC | Age: 61
End: 2021-09-15

## 2021-09-15 ENCOUNTER — TELEPHONE (OUTPATIENT)
Dept: CARDIOLOGY CLINIC | Age: 61
End: 2021-09-15

## 2021-09-15 NOTE — TELEPHONE ENCOUNTER
----- Message from Tiki Oleary MD sent at 9/15/2021 11:05 AM EDT -----  No evidence for ascending aortic aneurysm by CTA  ----- Message -----  From: Rommel Min Results In  Sent: 9/14/2021   4:04 PM EDT  To: Tiki Oleary MD

## 2022-01-25 ENCOUNTER — OFFICE VISIT (OUTPATIENT)
Dept: CARDIOLOGY CLINIC | Age: 62
End: 2022-01-25
Payer: COMMERCIAL

## 2022-01-25 VITALS
HEART RATE: 76 BPM | WEIGHT: 200 LBS | OXYGEN SATURATION: 97 % | DIASTOLIC BLOOD PRESSURE: 100 MMHG | SYSTOLIC BLOOD PRESSURE: 160 MMHG | HEIGHT: 72 IN | BODY MASS INDEX: 27.09 KG/M2 | RESPIRATION RATE: 16 BRPM

## 2022-01-25 DIAGNOSIS — Z86.711 HISTORY OF PULMONARY EMBOLUS (PE): ICD-10-CM

## 2022-01-25 DIAGNOSIS — I77.810 DILATED AORTIC ROOT (HCC): ICD-10-CM

## 2022-01-25 DIAGNOSIS — R00.2 PALPITATION: Primary | ICD-10-CM

## 2022-01-25 DIAGNOSIS — R00.2 PALPITATIONS: ICD-10-CM

## 2022-01-25 DIAGNOSIS — I10 WHITE COAT SYNDROME WITH DIAGNOSIS OF HYPERTENSION: ICD-10-CM

## 2022-01-25 DIAGNOSIS — I10 ESSENTIAL HYPERTENSION: ICD-10-CM

## 2022-01-25 DIAGNOSIS — Z86.718 HISTORY OF DVT (DEEP VEIN THROMBOSIS): ICD-10-CM

## 2022-01-25 PROCEDURE — 99214 OFFICE O/P EST MOD 30 MIN: CPT | Performed by: SPECIALIST

## 2022-01-25 PROCEDURE — 93000 ELECTROCARDIOGRAM COMPLETE: CPT | Performed by: SPECIALIST

## 2022-01-25 NOTE — PROGRESS NOTES
HISTORY OF PRESENT ILLNESS  Odessa Chinchilla is a 59 y.o. male. He has whitecoat hypertension and mild dilatation of the ascending aorta. Recently he has been having palpitations. His blood pressure is elevated in the office but at home it is generally 118/70. He may have occasional shortness of breath but he is able to do the elliptical for 30 minutes followed by the exercise bike for 30 minutes almost every day. 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.1    History of DVT (deep vein thrombosis) Z86.718    History of pulmonary embolus (PE) Z86.711    Idiopathic small and large fiber sensory neuropathy G60.8    Cervical stenosis of spinal canal M48.02    PE (pulmonary thromboembolism) ([address] I26.99     Past Medical History:   Diagnosis Date    Bilateral pulmonary embolism ([address] 5/22/2018    BPH (benign prostatic hyperplasia)     DVT, bilateral lower limbs ([address] 5/22/2018    Gout     HTN (hypertension)     Hypertension     Neutropenia ([address]     Prediabetes     Thromboembolus ([address]     2009 & 2012    Thromboembolus [facility])     2009 & 2012. 2014 elevated D dimer.       Past Surgical History:   Procedure Laterality Date    HX COLONOSCOPY  2012    HX COLONOSCOPY  2014    HX HEENT  2015    ORAL SURGERY X 3    HX HEENT  04/2015    SINUS SURGERY    HX KNEE ARTHROSCOPY Left        Current Outpatient Medications   Medication Sig Dispense Refill    lisinopril-hydroCHLOROthiazide (PRINZIDE, ZESTORETIC) 20-25 mg per tablet TAKE 1 TABLET BY MOUTH  DAILY 90 Tab 1    apixaban (ELIQUIS) 5 mg tablet Take 1 Tab by mouth two (2) times a day. eliquis 10mg po bid x 7 days then 5mg po bid 30 Tab 0    senna (SENNA) 8.6 mg tablet Take 2 Tablets by mouth daily.  colchicine 0.6 mg tablet TAKE 2 AT THE FIRST SIGN OF FLARE - THEN 1 TAB 1HR LATER, THEN 1 TAB EVERY DAY TIL SYMPTOMS RESOLVE (Patient not taking: Reported on 8/25/2021)  0       Review of Systems   Cardiovascular: Positive for palpitations. All other systems reviewed and are negative. Visit Vitals  BP (!) 160/100 (BP 1 Location: Left upper arm, BP Patient Position: Sitting, BP Cuff Size: Adult)   Pulse 76   Resp 16   Ht 6' (1.829 m)   Wt 200 lb (90.7 kg)   SpO2 97%   BMI 27.12 kg/m²       Physical Exam  Vitals and nursing note reviewed. Constitutional:       Appearance: Normal appearance. HENT:      Head: Normocephalic. Right Ear: External ear normal.      Left Ear: External ear normal.      Nose: Nose normal.      Mouth/Throat:      Mouth: Mucous membranes are moist.   Eyes:      Extraocular Movements: Extraocular movements intact. Cardiovascular:      Rate and Rhythm: Normal rate and regular rhythm. Pulmonary:      Breath sounds: Normal breath sounds. Abdominal:      Palpations: Abdomen is soft. Musculoskeletal:         General: Normal range of motion. Cervical back: Normal range of motion. Skin:     General: Skin is warm. Neurological:      Mental Status: He is alert and oriented to person, place, and time. Psychiatric:         Behavior: Behavior normal.         ASSESSMENT and PLAN  He seems to be quite concerned about the palpitations. I suspect he is having atrial premature beats. He could take a very low-dose of the beta-blocker and could wear a monitor. However I have suggested that he take over-the-counter supplementation with magnesium and potassium since his blood pressure regimen includes 25 mg of hydrochlorothiazide. If this is not effective then we will try one of the other strategies and he will let us know in this regard. Otherwise follow-up will be in 6 months.

## 2022-02-17 ENCOUNTER — TELEPHONE (OUTPATIENT)
Dept: CARDIOLOGY CLINIC | Age: 62
End: 2022-02-17

## 2022-03-18 PROBLEM — Z86.711 HISTORY OF PULMONARY EMBOLUS (PE): Status: ACTIVE | Noted: 2019-01-27

## 2022-03-18 PROBLEM — M10.00 IDIOPATHIC GOUT: Status: ACTIVE | Noted: 2017-08-15

## 2022-03-19 PROBLEM — I26.99 PE (PULMONARY THROMBOEMBOLISM) (HCC): Status: ACTIVE | Noted: 2019-10-12

## 2022-03-19 PROBLEM — R94.31 ABNORMAL ECG: Status: ACTIVE | Noted: 2018-05-22

## 2022-03-19 PROBLEM — I10 ESSENTIAL HYPERTENSION: Status: ACTIVE | Noted: 2017-08-15

## 2022-03-19 PROBLEM — Z86.718 HISTORY OF DVT (DEEP VEIN THROMBOSIS): Status: ACTIVE | Noted: 2019-01-27

## 2022-03-19 PROBLEM — R00.2 PALPITATIONS: Status: ACTIVE | Noted: 2017-06-22

## 2022-03-19 PROBLEM — R55 NEAR SYNCOPE: Status: ACTIVE | Noted: 2018-05-22

## 2022-03-20 PROBLEM — I77.810 DILATED AORTIC ROOT (HCC): Status: ACTIVE | Noted: 2017-09-18

## 2022-03-20 PROBLEM — M48.02 CERVICAL STENOSIS OF SPINAL CANAL: Status: ACTIVE | Noted: 2019-10-07

## 2022-03-20 PROBLEM — R79.1 ELEVATED FACTOR VIII LEVEL: Status: ACTIVE | Noted: 2018-05-22

## 2022-03-20 PROBLEM — G60.8 IDIOPATHIC SMALL AND LARGE FIBER SENSORY NEUROPATHY: Status: ACTIVE | Noted: 2019-03-11

## 2022-07-25 ENCOUNTER — ANCILLARY PROCEDURE (OUTPATIENT)
Dept: CARDIOLOGY CLINIC | Age: 62
End: 2022-07-25

## 2022-07-25 ENCOUNTER — OFFICE VISIT (OUTPATIENT)
Dept: CARDIOLOGY CLINIC | Age: 62
End: 2022-07-25
Payer: COMMERCIAL

## 2022-07-25 VITALS
HEART RATE: 69 BPM | SYSTOLIC BLOOD PRESSURE: 208 MMHG | RESPIRATION RATE: 18 BRPM | HEIGHT: 72 IN | OXYGEN SATURATION: 99 % | DIASTOLIC BLOOD PRESSURE: 98 MMHG | BODY MASS INDEX: 27.39 KG/M2 | WEIGHT: 202.2 LBS

## 2022-07-25 VITALS
DIASTOLIC BLOOD PRESSURE: 98 MMHG | BODY MASS INDEX: 27.36 KG/M2 | HEIGHT: 72 IN | SYSTOLIC BLOOD PRESSURE: 208 MMHG | WEIGHT: 202 LBS

## 2022-07-25 DIAGNOSIS — I10 ESSENTIAL HYPERTENSION: Primary | ICD-10-CM

## 2022-07-25 DIAGNOSIS — R00.2 PALPITATIONS: ICD-10-CM

## 2022-07-25 DIAGNOSIS — I77.89 AORTIC ROOT ENLARGEMENT (HCC): ICD-10-CM

## 2022-07-25 DIAGNOSIS — I10 WHITE COAT SYNDROME WITH DIAGNOSIS OF HYPERTENSION: ICD-10-CM

## 2022-07-25 DIAGNOSIS — I77.810 DILATED AORTIC ROOT (HCC): ICD-10-CM

## 2022-07-25 DIAGNOSIS — Z86.711 HISTORY OF PULMONARY EMBOLUS (PE): ICD-10-CM

## 2022-07-25 PROCEDURE — 93306 TTE W/DOPPLER COMPLETE: CPT | Performed by: SPECIALIST

## 2022-07-25 PROCEDURE — 99214 OFFICE O/P EST MOD 30 MIN: CPT | Performed by: SPECIALIST

## 2022-07-25 NOTE — PROGRESS NOTES
HISTORY OF PRESENT ILLNESS  Gianni Garcia is a 59 y.o. male. He has treated hypertension but also significant whitecoat hypertension. He has a history of recurrent pulmonary embolism and DVT and is also on anticoagulation. His aortic root has been mildly dilated. A year ago it was 4.0 on echo and 3.8 by CT of the chest and aorta. His blood pressure at home is often 105/65 and can be as low as 94/59 or as high as 117/64. 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 eliquis 10mg po bid x 7 days then 5mg po bid 30 Tab 0    colchicine 0.6 mg tablet TAKE 2 AT THE FIRST SIGN OF FLARE - THEN 1 TAB 1HR LATER, THEN 1 TAB EVERY DAY TIL SYMPTOMS RESOLVE  0    senna (SENOKOT) 8.6 mg tablet Take 2 Tablets by mouth daily. (Patient not taking: Reported on 7/25/2022)       Past Medical History:   Diagnosis Date    Bilateral pulmonary embolism ([address] 5/22/2018    BPH (benign prostatic hyperplasia)     DVT, bilateral lower limbs ([address] 5/22/2018    Gout     HTN (hypertension)     Hypertension     Neutropenia ([address]     Prediabetes     Throm[facility]embolus [facility]     2009 & 2012    Thromboembolus [facility])     2009 & 2012. 2014 elevated D dimer.       Past Surgical History: Procedure Laterality Date    HX COLONOSCOPY  2012    HX COLONOSCOPY  2014    HX HEENT  2015    ORAL SURGERY X 3    HX HEENT  04/2015    SINUS SURGERY    HX KNEE ARTHROSCOPY Left        Review of Systems   Constitutional: Negative. HENT: Negative. Respiratory: Negative. Cardiovascular: Negative. Gastrointestinal: Negative. Genitourinary: Negative. Skin: Negative. Neurological: Negative. Endo/Heme/Allergies: Negative. Visit Vitals  BP (!) 208/98   Pulse 69   Resp 18   Ht 6' (1.829 m)   Wt 202 lb 3.2 oz (91.7 kg)   SpO2 99%   BMI 27.42 kg/m²       Physical Exam  Vitals and nursing note reviewed. Constitutional:       Appearance: Normal appearance. HENT:      Head: Normocephalic. Right Ear: External ear normal.      Left Ear: External ear normal.      Nose: Nose normal.      Mouth/Throat:      Mouth: Mucous membranes are moist.   Eyes:      Extraocular Movements: Extraocular movements intact. Cardiovascular:      Rate and Rhythm: Normal rate and regular rhythm. Heart sounds: Normal heart sounds. Pulmonary:      Breath sounds: Normal breath sounds. Abdominal:      Palpations: Abdomen is soft. Musculoskeletal:         General: Normal range of motion. Cervical back: Normal range of motion. Skin:     General: Skin is warm. Neurological:      Mental Status: He is alert and oriented to person, place, and time. Psychiatric:         Behavior: Behavior normal.       ASSESSMENT and PLAN  He has significant whitecoat hypertension with low normal blood pressures at home so I would not change his regimen. I believe that he can come back in a year but I will schedule another echo to be done and call him regarding the size of the aortic root and whether or not another CT scan needs to be performed.

## 2022-07-28 ENCOUNTER — TELEPHONE (OUTPATIENT)
Dept: CARDIOLOGY CLINIC | Age: 62
End: 2022-07-28

## 2022-07-28 LAB
ECHO AO ASC DIAM: 4 CM
ECHO AO ASCENDING AORTA INDEX: 1.87 CM/M2
ECHO AO ROOT DIAM: 4 CM
ECHO AO ROOT INDEX: 1.87 CM/M2
ECHO AV PEAK GRADIENT: 5 MMHG
ECHO AV PEAK VELOCITY: 1.1 M/S
ECHO AV VELOCITY RATIO: 1.09
ECHO EST RA PRESSURE: 3 MMHG
ECHO LA DIAMETER INDEX: 1.82 CM/M2
ECHO LA DIAMETER: 3.9 CM
ECHO LA TO AORTIC ROOT RATIO: 0.98
ECHO LA VOL 2C: 85 ML (ref 18–58)
ECHO LA VOL 4C: 69 ML (ref 18–58)
ECHO LA VOL BP: 76 ML (ref 18–58)
ECHO LA VOL/BSA BIPLANE: 36 ML/M2 (ref 16–34)
ECHO LA VOLUME AREA LENGTH: 78 ML
ECHO LA VOLUME INDEX A2C: 40 ML/M2 (ref 16–34)
ECHO LA VOLUME INDEX A4C: 32 ML/M2 (ref 16–34)
ECHO LA VOLUME INDEX AREA LENGTH: 36 ML/M2 (ref 16–34)
ECHO LV E' LATERAL VELOCITY: 12 CM/S
ECHO LV E' SEPTAL VELOCITY: 11 CM/S
ECHO LV FRACTIONAL SHORTENING: 42 % (ref 28–44)
ECHO LV INTERNAL DIMENSION DIASTOLE INDEX: 2.01 CM/M2
ECHO LV INTERNAL DIMENSION DIASTOLIC: 4.3 CM (ref 4.2–5.9)
ECHO LV INTERNAL DIMENSION SYSTOLIC INDEX: 1.17 CM/M2
ECHO LV INTERNAL DIMENSION SYSTOLIC: 2.5 CM
ECHO LV ISOVOLUMETRIC RELAXATION TIME (IVRT): 97.1 MS
ECHO LV IVSD: 1.2 CM (ref 0.6–1)
ECHO LV MASS 2D: 184.7 G (ref 88–224)
ECHO LV MASS INDEX 2D: 86.3 G/M2 (ref 49–115)
ECHO LV POSTERIOR WALL DIASTOLIC: 1.2 CM (ref 0.6–1)
ECHO LV RELATIVE WALL THICKNESS RATIO: 0.56
ECHO LVOT PEAK GRADIENT: 6 MMHG
ECHO LVOT PEAK VELOCITY: 1.2 M/S
ECHO MV "A" WAVE DURATION: 188.4 MSEC
ECHO MV A VELOCITY: 0.66 M/S
ECHO MV E DECELERATION TIME (DT): 204 MS
ECHO MV E VELOCITY: 0.5 M/S
ECHO MV E/A RATIO: 0.76
ECHO MV E/E' LATERAL: 4.17
ECHO MV E/E' RATIO (AVERAGED): 4.36
ECHO MV E/E' SEPTAL: 4.55
ECHO PVEIN A DURATION: 154.1 MS
ECHO PVEIN A VELOCITY: 0.3 M/S
ECHO RIGHT VENTRICULAR SYSTOLIC PRESSURE (RVSP): 39 MMHG
ECHO RV FREE WALL PEAK S': 19 CM/S
ECHO RV TAPSE: 3.1 CM (ref 1.7–?)
ECHO TV REGURGITANT MAX VELOCITY: 3 M/S
ECHO TV REGURGITANT PEAK GRADIENT: 36 MMHG

## 2022-07-28 NOTE — TELEPHONE ENCOUNTER
----- Message from Prudencio Herrera MD sent at 7/28/2022  8:45 AM EDT -----  Size of aortic root has not changed since last year

## 2023-05-20 RX ORDER — SENNA PLUS 8.6 MG/1
2 TABLET ORAL DAILY
COMMUNITY

## 2023-05-20 RX ORDER — COLCHICINE 0.6 MG/1
TABLET ORAL
COMMUNITY
Start: 2019-08-16

## 2023-05-20 RX ORDER — LISINOPRIL AND HYDROCHLOROTHIAZIDE 25; 20 MG/1; MG/1
1 TABLET ORAL DAILY
COMMUNITY
Start: 2020-02-15

## 2023-07-27 ENCOUNTER — OFFICE VISIT (OUTPATIENT)
Age: 63
End: 2023-07-27
Payer: COMMERCIAL

## 2023-07-27 VITALS
OXYGEN SATURATION: 98 % | SYSTOLIC BLOOD PRESSURE: 144 MMHG | BODY MASS INDEX: 27.04 KG/M2 | WEIGHT: 199.6 LBS | HEIGHT: 72 IN | DIASTOLIC BLOOD PRESSURE: 78 MMHG | HEART RATE: 57 BPM

## 2023-07-27 DIAGNOSIS — I10 WHITE COAT SYNDROME WITH DIAGNOSIS OF HYPERTENSION: Primary | ICD-10-CM

## 2023-07-27 DIAGNOSIS — Z86.718 PERSONAL HISTORY OF OTHER VENOUS THROMBOSIS AND EMBOLISM: ICD-10-CM

## 2023-07-27 DIAGNOSIS — I10 ESSENTIAL (PRIMARY) HYPERTENSION: ICD-10-CM

## 2023-07-27 DIAGNOSIS — I77.810 THORACIC AORTIC ECTASIA (HCC): ICD-10-CM

## 2023-07-27 DIAGNOSIS — R79.1 ELEVATED FACTOR VIII LEVEL: ICD-10-CM

## 2023-07-27 DIAGNOSIS — I77.810 DILATED AORTIC ROOT (HCC): ICD-10-CM

## 2023-07-27 PROCEDURE — 3077F SYST BP >= 140 MM HG: CPT | Performed by: SPECIALIST

## 2023-07-27 PROCEDURE — 3078F DIAST BP <80 MM HG: CPT | Performed by: SPECIALIST

## 2023-07-27 PROCEDURE — 99214 OFFICE O/P EST MOD 30 MIN: CPT | Performed by: SPECIALIST

## 2023-07-27 RX ORDER — HYDROCHLOROTHIAZIDE 25 MG/1
TABLET ORAL
COMMUNITY

## 2023-07-27 RX ORDER — FLUTICASONE PROPIONATE 50 MCG
SPRAY, SUSPENSION (ML) NASAL
COMMUNITY

## 2023-07-27 RX ORDER — TADALAFIL 5 MG/1
TABLET ORAL
COMMUNITY

## 2023-07-27 RX ORDER — AZELASTINE 1 MG/ML
SPRAY, METERED NASAL
COMMUNITY

## 2023-07-27 ASSESSMENT — PATIENT HEALTH QUESTIONNAIRE - PHQ9
SUM OF ALL RESPONSES TO PHQ QUESTIONS 1-9: 0
2. FEELING DOWN, DEPRESSED OR HOPELESS: 0
SUM OF ALL RESPONSES TO PHQ9 QUESTIONS 1 & 2: 0
1. LITTLE INTEREST OR PLEASURE IN DOING THINGS: 0
SUM OF ALL RESPONSES TO PHQ QUESTIONS 1-9: 0

## 2023-07-27 NOTE — PROGRESS NOTES
HISTORY OF PRESENT ILLNESS  Devonte Aponte is a 57 y.o. male   He has a history of clotting disorder and pulmonary embolism and is on long-term anticoagulation. He also has hypertension. His aortic root is mildly dilated. He was taking lisinopril with hydrochlorothiazide but was switched to just hydrochlorothiazide by his primary care physician. His weight is down 3 pounds. His blood pressure at home is usually between 824 and 561 systolic. Can be as low as 105 110. His last CT angiogram done almost 2 years ago showed a diameter of 3.8 cm to his ascending aorta. He cannot take a beta-blocker with resting heart rate.   HPI     Patient Active Problem List   Diagnosis    History of pulmonary embolus (PE)    ED (erectile dysfunction)    Idiopathic gout    PE (pulmonary thromboembolism) (HCC)    Near syncope    History of DVT (deep vein thrombosis)    Essential hypertension    Palpitations    Abnormal ECG    BPH (benign prostatic hyperplasia)    Cervical stenosis of spinal canal    Dilated aortic root (HCC)    Elevated factor VIII level    Idiopathic small and large fiber sensory neuropathy     Current Outpatient Medications   Medication Sig Dispense Refill    fluticasone (FLONASE) 50 MCG/ACT nasal spray fluticasone propionate 50 mcg/actuation nasal spray,suspension      azelastine (ASTELIN) 0.1 % nasal spray azelastine 137 mcg (0.1 %) nasal spray aerosol      hydroCHLOROthiazide (HYDRODIURIL) 25 MG tablet hydrochlorothiazide 25 mg tablet   TAKE 1 TABLET BY MOUTH EVERY DAY      tadalafil (CIALIS) 5 MG tablet tadalafil 5 mg tablet      apixaban (ELIQUIS) 5 MG TABS tablet Take 1 tablet by mouth 2 times daily      colchicine (COLCRYS) 0.6 MG tablet TAKE 2 AT THE FIRST SIGN OF FLARE - THEN 1 TAB 1HR LATER, THEN 1 TAB EVERY DAY TIL SYMPTOMS RESOLVE      lisinopril-hydroCHLOROthiazide (PRINZIDE;ZESTORETIC) 20-25 MG per tablet Take 1 tablet by mouth daily      senna (SENOKOT) 8.6 MG tablet Take 2 tablets by mouth daily

## 2023-07-27 NOTE — PATIENT INSTRUCTIONS
If you have not heard from scheduling to schedule CTA by 2024, please call and schedule to have it done prior to your August appointment with Dr. Ai Gauthier next year.     Phone # to Rai [phone]     Future Appointments   Date Time Provider [address] [place] [address]   2024 10:40 AM MD LULI Gay AMB

## 2024-01-12 ENCOUNTER — HOSPITAL ENCOUNTER (OUTPATIENT)
Facility: HOSPITAL | Age: 64
End: 2024-01-12
Payer: MEDICARE

## 2024-01-12 DIAGNOSIS — M54.12 RADICULOPATHY, CERVICAL REGION: ICD-10-CM

## 2024-01-12 PROCEDURE — 70490 CT SOFT TISSUE NECK W/O DYE: CPT

## 2024-01-23 ENCOUNTER — TRANSCRIBE ORDERS (OUTPATIENT)
Facility: HOSPITAL | Age: 64
End: 2024-01-23

## 2024-01-23 DIAGNOSIS — M54.12 CERVICAL RADICULOPATHY: Primary | ICD-10-CM

## 2024-01-30 ENCOUNTER — HOSPITAL ENCOUNTER (OUTPATIENT)
Facility: HOSPITAL | Age: 64
Discharge: HOME | End: 2024-02-02
Attending: NEUROLOGICAL SURGERY
Payer: MEDICARE

## 2024-01-30 DIAGNOSIS — M54.12 CERVICAL RADICULOPATHY: ICD-10-CM

## 2024-01-30 PROCEDURE — 72141 MRI NECK SPINE W/O DYE: CPT

## 2024-04-05 ENCOUNTER — TELEPHONE (OUTPATIENT)
Age: 64
End: 2024-04-05

## 2024-04-05 NOTE — TELEPHONE ENCOUNTER
Spoke with patient regarding referral. I informed him that it looks like he saw Dr. Garland in the past. He is going to call Dr. Garland's office to schedule an appointment.

## 2024-05-21 ENCOUNTER — TELEPHONE (OUTPATIENT)
Age: 64
End: 2024-05-21

## 2024-05-21 NOTE — TELEPHONE ENCOUNTER
Returned vm left by Shelby from [facility] in regards to pt as she was  requesting information so referral could be sent over; advised that pt was recently seen by [facility] and should follow with them. Shelby expressed understanding.

## 2024-05-21 NOTE — TELEPHONE ENCOUNTER
Spoke with Lorraine from [facility] regarding patient. I informed her that he was scheduled to see Dr. Garland twice but both appointments were canceled and he ended up being seeing somewhere else (per cancel note). I gave her number to [facility] to reschedule appointment.

## 2024-06-27 ENCOUNTER — TELEPHONE (OUTPATIENT)
Age: 64
End: 2024-06-27

## 2024-06-27 ENCOUNTER — HOSPITAL ENCOUNTER (OUTPATIENT)
Facility: HOSPITAL | Age: 64
Discharge: HOME | End: 2024-06-27
Attending: SPECIALIST
Payer: MEDICARE

## 2024-06-27 DIAGNOSIS — I77.810 DILATED AORTIC ROOT (HCC): ICD-10-CM

## 2024-06-27 LAB — CREAT BLD-MCNC: 1.1 MG/DL (ref 0.6–1.3)

## 2024-06-27 PROCEDURE — 71275 CT ANGIOGRAPHY CHEST: CPT

## 2024-06-27 PROCEDURE — 6360000004 HC RX CONTRAST MEDICATION: Performed by: RADIOLOGY

## 2024-06-27 PROCEDURE — 82565 ASSAY OF CREATININE: CPT

## 2024-06-27 RX ADMIN — IOPAMIDOL 100 ML: 755 INJECTION, SOLUTION INTRAVENOUS at 10:02

## 2024-06-27 NOTE — TELEPHONE ENCOUNTER
----- Message from José Spencer MD sent at 6/27/2024 12:38 PM EDT -----  Normal creatinine  ----- Message -----  From: [facility]  Sent: 6/27/2024  10:02 AM EDT  To: José Spencer MD

## 2024-08-01 ENCOUNTER — OFFICE VISIT (OUTPATIENT)
Age: 64
End: 2024-08-01
Payer: MEDICARE

## 2024-08-01 VITALS
SYSTOLIC BLOOD PRESSURE: 150 MMHG | DIASTOLIC BLOOD PRESSURE: 84 MMHG | OXYGEN SATURATION: 99 % | HEART RATE: 71 BPM | WEIGHT: 199 LBS | BODY MASS INDEX: 26.99 KG/M2

## 2024-08-01 DIAGNOSIS — I77.810 THORACIC AORTIC ECTASIA (HCC): ICD-10-CM

## 2024-08-01 DIAGNOSIS — I10 ESSENTIAL (PRIMARY) HYPERTENSION: ICD-10-CM

## 2024-08-01 DIAGNOSIS — I77.810 DILATED AORTIC ROOT (HCC): Primary | ICD-10-CM

## 2024-08-01 DIAGNOSIS — I10 WHITE COAT SYNDROME WITH DIAGNOSIS OF HYPERTENSION: ICD-10-CM

## 2024-08-01 DIAGNOSIS — R79.1 ELEVATED FACTOR VIII LEVEL: ICD-10-CM

## 2024-08-01 PROCEDURE — 3079F DIAST BP 80-89 MM HG: CPT | Performed by: SPECIALIST

## 2024-08-01 PROCEDURE — 3077F SYST BP >= 140 MM HG: CPT | Performed by: SPECIALIST

## 2024-08-01 PROCEDURE — 99214 OFFICE O/P EST MOD 30 MIN: CPT | Performed by: SPECIALIST

## 2024-08-01 PROCEDURE — G8419 CALC BMI OUT NRM PARAM NOF/U: HCPCS | Performed by: SPECIALIST

## 2024-08-01 PROCEDURE — 1036F TOBACCO NON-USER: CPT | Performed by: SPECIALIST

## 2024-08-01 PROCEDURE — G8427 DOCREV CUR MEDS BY ELIG CLIN: HCPCS | Performed by: SPECIALIST

## 2024-08-01 PROCEDURE — 3017F COLORECTAL CA SCREEN DOC REV: CPT | Performed by: SPECIALIST

## 2024-08-01 PROCEDURE — 1123F ACP DISCUSS/DSCN MKR DOCD: CPT | Performed by: SPECIALIST

## 2024-08-01 RX ORDER — ALLOPURINOL 100 MG/1
2 TABLET ORAL DAILY
COMMUNITY
Start: 2023-08-29

## 2024-08-01 ASSESSMENT — ENCOUNTER SYMPTOMS
GASTROINTESTINAL NEGATIVE: 1
EYES NEGATIVE: 1
RESPIRATORY NEGATIVE: 1

## 2024-08-01 NOTE — PROGRESS NOTES
HISTORY OF PRESENT ILLNESS  Arnold Snow is a 63 y.o. male   He has treated hypertension and whitecoat hypertension.  He also has a history of DVT and pulmonary embolism and a clotting disorder for which he takes Eliquis.  His blood pressure medications have been adjusted by his primary care physician.  He recently had another CT angiogram done that showed only mild enlargement of his ascending aorta unchanged from 2 years ago.  His blood pressure at home is in the range of 120/63.  He exercises daily.  His primary care physician put him on amlodipine 5 mg twice daily in addition to his hydrochlorothiazide 25 mg a day.  He used to take a combination pill of lisinopril and hydrochlorothiazide.  HPI     Specialty Problems          Cardiology Problems    Essential hypertension        Dilated aortic root ([facility])        Near syncope        PE (pulmonary thromboembolism) ([facility])          Current Outpatient Medications   Medication Instructions    allopurinol (ZYLOPRIM) 100 MG tablet 2 tablets, Oral, DAILY    apixaban (ELIQUIS) 5 mg, Oral, 2 TIMES DAILY    azelastine (ASTELIN) 0.1 % nasal spray azelastine 137 mcg (0.1 %) nasal spray aerosol    colchicine (COLCRYS) 0.6 MG tablet TAKE 2 AT THE FIRST SIGN OF FLARE - THEN 1 TAB 1HR LATER, THEN 1 TAB EVERY DAY TIL SYMPTOMS RESOLVE    fluticasone (FLONASE) 50 MCG/ACT nasal spray fluticasone propionate 50 mcg/actuation nasal spray,suspension    hydroCHLOROthiazide (HYDRODIURIL) 25 MG tablet hydrochlorothiazide 25 mg tablet   TAKE 1 TABLET BY MOUTH EVERY DAY    senna (SENOKOT) 8.6 MG tablet 2 tablets, DAILY    tadalafil (CIALIS) 5 MG tablet tadalafil 5 mg tablet      Allergies   Allergen Reactions    Levofloxacin Rash     Past Medical History:   Diagnosis Date    Bilateral pulmonary embolism (HCC) 5/22/2018    BPH (benign prostatic hyperplasia)     DVT, bilateral lower limbs ([facility]) 5/22/2018    Gout     HTN (hypertension)     Hypertension     Neutropenia (HCC)     Prediabetes

## 2025-08-07 ENCOUNTER — OFFICE VISIT (OUTPATIENT)
Age: 65
End: 2025-08-07
Payer: MEDICARE

## 2025-08-07 VITALS
OXYGEN SATURATION: 98 % | SYSTOLIC BLOOD PRESSURE: 160 MMHG | WEIGHT: 193 LBS | HEIGHT: 72 IN | BODY MASS INDEX: 26.14 KG/M2 | DIASTOLIC BLOOD PRESSURE: 90 MMHG | HEART RATE: 65 BPM

## 2025-08-07 DIAGNOSIS — I10 ESSENTIAL (PRIMARY) HYPERTENSION: Primary | ICD-10-CM

## 2025-08-07 DIAGNOSIS — I77.810 DILATED AORTIC ROOT: ICD-10-CM

## 2025-08-07 PROCEDURE — 1159F MED LIST DOCD IN RCRD: CPT | Performed by: INTERNAL MEDICINE

## 2025-08-07 PROCEDURE — G8419 CALC BMI OUT NRM PARAM NOF/U: HCPCS | Performed by: INTERNAL MEDICINE

## 2025-08-07 PROCEDURE — 3077F SYST BP >= 140 MM HG: CPT | Performed by: INTERNAL MEDICINE

## 2025-08-07 PROCEDURE — 93010 ELECTROCARDIOGRAM REPORT: CPT | Performed by: INTERNAL MEDICINE

## 2025-08-07 PROCEDURE — 93005 ELECTROCARDIOGRAM TRACING: CPT | Performed by: INTERNAL MEDICINE

## 2025-08-07 PROCEDURE — 99214 OFFICE O/P EST MOD 30 MIN: CPT | Performed by: INTERNAL MEDICINE

## 2025-08-07 PROCEDURE — 1036F TOBACCO NON-USER: CPT | Performed by: INTERNAL MEDICINE

## 2025-08-07 PROCEDURE — 1126F AMNT PAIN NOTED NONE PRSNT: CPT | Performed by: INTERNAL MEDICINE

## 2025-08-07 PROCEDURE — G8427 DOCREV CUR MEDS BY ELIG CLIN: HCPCS | Performed by: INTERNAL MEDICINE

## 2025-08-07 PROCEDURE — 3017F COLORECTAL CA SCREEN DOC REV: CPT | Performed by: INTERNAL MEDICINE

## 2025-08-07 PROCEDURE — 1123F ACP DISCUSS/DSCN MKR DOCD: CPT | Performed by: INTERNAL MEDICINE

## 2025-08-07 PROCEDURE — 3080F DIAST BP >= 90 MM HG: CPT | Performed by: INTERNAL MEDICINE

## 2025-08-07 PROCEDURE — G2211 COMPLEX E/M VISIT ADD ON: HCPCS | Performed by: INTERNAL MEDICINE

## 2025-08-07 RX ORDER — AMLODIPINE BESYLATE 5 MG/1
1 TABLET ORAL 2 TIMES DAILY
COMMUNITY
Start: 2024-01-08

## 2025-08-07 ASSESSMENT — PATIENT HEALTH QUESTIONNAIRE - PHQ9
SUM OF ALL RESPONSES TO PHQ QUESTIONS 1-9: 0
SUM OF ALL RESPONSES TO PHQ QUESTIONS 1-9: 0
1. LITTLE INTEREST OR PLEASURE IN DOING THINGS: NOT AT ALL
SUM OF ALL RESPONSES TO PHQ QUESTIONS 1-9: 0
SUM OF ALL RESPONSES TO PHQ QUESTIONS 1-9: 0
2. FEELING DOWN, DEPRESSED OR HOPELESS: NOT AT ALL

## 2025-08-13 ENCOUNTER — ANCILLARY PROCEDURE (OUTPATIENT)
Age: 65
End: 2025-08-13
Payer: MEDICARE

## 2025-08-13 VITALS
WEIGHT: 193 LBS | BODY MASS INDEX: 26.14 KG/M2 | DIASTOLIC BLOOD PRESSURE: 90 MMHG | HEIGHT: 72 IN | SYSTOLIC BLOOD PRESSURE: 160 MMHG

## 2025-08-13 DIAGNOSIS — I10 ESSENTIAL (PRIMARY) HYPERTENSION: ICD-10-CM

## 2025-08-13 LAB
ECHO AO ASC DIAM: 3.9 CM
ECHO AO ASCENDING AORTA INDEX: 1.86 CM/M2
ECHO AO ROOT DIAM: 3.9 CM
ECHO AO ROOT INDEX: 1.86 CM/M2
ECHO AV AREA PEAK VELOCITY: 3.7 CM2
ECHO AV AREA VTI: 4 CM2
ECHO AV AREA/BSA PEAK VELOCITY: 1.8 CM2/M2
ECHO AV AREA/BSA VTI: 1.9 CM2/M2
ECHO AV MEAN GRADIENT: 4 MMHG
ECHO AV MEAN VELOCITY: 0.9 M/S
ECHO AV PEAK GRADIENT: 7 MMHG
ECHO AV PEAK VELOCITY: 1.3 M/S
ECHO AV VELOCITY RATIO: 1
ECHO AV VTI: 27.8 CM
ECHO BSA: 2.11 M2
ECHO EST RA PRESSURE: 3 MMHG
ECHO LA DIAMETER INDEX: 1.43 CM/M2
ECHO LA DIAMETER: 3 CM
ECHO LA TO AORTIC ROOT RATIO: 0.77
ECHO LA VOL A-L A2C: 74 ML (ref 18–58)
ECHO LA VOL A-L A4C: 57 ML (ref 18–58)
ECHO LA VOL BP: 61 ML (ref 18–58)
ECHO LA VOL MOD A2C: 69 ML (ref 18–58)
ECHO LA VOL MOD A4C: 55 ML (ref 18–58)
ECHO LA VOL/BSA BIPLANE: 29 ML/M2 (ref 16–34)
ECHO LA VOLUME AREA LENGTH: 65 ML
ECHO LA VOLUME INDEX A-L A2C: 35 ML/M2 (ref 16–34)
ECHO LA VOLUME INDEX A-L A4C: 27 ML/M2 (ref 16–34)
ECHO LA VOLUME INDEX AREA LENGTH: 31 ML/M2 (ref 16–34)
ECHO LA VOLUME INDEX MOD A2C: 33 ML/M2 (ref 16–34)
ECHO LA VOLUME INDEX MOD A4C: 26 ML/M2 (ref 16–34)
ECHO LV E' LATERAL VELOCITY: 12.69 CM/S
ECHO LV E' SEPTAL VELOCITY: 9.58 CM/S
ECHO LV EDV A2C: 57 ML
ECHO LV EDV A4C: 71 ML
ECHO LV EDV BP: 64 ML (ref 67–155)
ECHO LV EDV INDEX A4C: 34 ML/M2
ECHO LV EDV INDEX BP: 30 ML/M2
ECHO LV EDV NDEX A2C: 27 ML/M2
ECHO LV EF PHYSICIAN: 70 %
ECHO LV EJECTION FRACTION A2C: 70 %
ECHO LV EJECTION FRACTION A4C: 77 %
ECHO LV EJECTION FRACTION BIPLANE: 73 % (ref 55–100)
ECHO LV ESV A2C: 17 ML
ECHO LV ESV A4C: 17 ML
ECHO LV ESV BP: 17 ML (ref 22–58)
ECHO LV ESV INDEX A2C: 8 ML/M2
ECHO LV ESV INDEX A4C: 8 ML/M2
ECHO LV ESV INDEX BP: 8 ML/M2
ECHO LV FRACTIONAL SHORTENING: 51 % (ref 28–44)
ECHO LV INTERNAL DIMENSION DIASTOLE INDEX: 2.14 CM/M2
ECHO LV INTERNAL DIMENSION DIASTOLIC: 4.5 CM (ref 4.2–5.9)
ECHO LV INTERNAL DIMENSION SYSTOLIC INDEX: 1.05 CM/M2
ECHO LV INTERNAL DIMENSION SYSTOLIC: 2.2 CM
ECHO LV IVSD: 1.1 CM (ref 0.6–1)
ECHO LV IVSS: 1.7 CM
ECHO LV MASS 2D: 175 G (ref 88–224)
ECHO LV MASS INDEX 2D: 83.3 G/M2 (ref 49–115)
ECHO LV POSTERIOR WALL DIASTOLIC: 1.1 CM (ref 0.6–1)
ECHO LV POSTERIOR WALL SYSTOLIC: 1.6 CM
ECHO LV RELATIVE WALL THICKNESS RATIO: 0.49
ECHO LVOT AREA: 3.8 CM2
ECHO LVOT AV VTI INDEX: 1.03
ECHO LVOT DIAM: 2.2 CM
ECHO LVOT MEAN GRADIENT: 3 MMHG
ECHO LVOT PEAK GRADIENT: 6 MMHG
ECHO LVOT PEAK VELOCITY: 1.3 M/S
ECHO LVOT STROKE VOLUME INDEX: 51.7 ML/M2
ECHO LVOT SV: 108.7 ML
ECHO LVOT VTI: 28.6 CM
ECHO MV A VELOCITY: 0.57 M/S
ECHO MV AREA PHT: 3.9 CM2
ECHO MV AREA VTI: 5 CM2
ECHO MV E DECELERATION TIME (DT): 195.6 MS
ECHO MV E VELOCITY: 0.66 M/S
ECHO MV E/A RATIO: 1.16
ECHO MV E/E' LATERAL: 5.2
ECHO MV E/E' RATIO (AVERAGED): 6.05
ECHO MV E/E' SEPTAL: 6.89
ECHO MV LVOT VTI INDEX: 0.76
ECHO MV MAX VELOCITY: 0.8 M/S
ECHO MV MEAN GRADIENT: 1 MMHG
ECHO MV MEAN VELOCITY: 0.4 M/S
ECHO MV PEAK GRADIENT: 3 MMHG
ECHO MV PRESSURE HALF TIME (PHT): 56.7 MS
ECHO MV VTI: 21.8 CM
ECHO PV MAX VELOCITY: 0.8 M/S
ECHO PV PEAK GRADIENT: 2 MMHG
ECHO RA END SYSTOLIC VOLUME APICAL 4 CHAMBER INDEX BSA: 39 ML/M2
ECHO RA VOLUME: 81 ML
ECHO RIGHT VENTRICULAR SYSTOLIC PRESSURE (RVSP): 31 MMHG
ECHO RV FREE WALL PEAK S': 14.3 CM/S
ECHO RV INTERNAL DIMENSION: 4.2 CM
ECHO RV TAPSE: 3.4 CM (ref 1.7–?)
ECHO RVOT PEAK GRADIENT: 2 MMHG
ECHO RVOT PEAK VELOCITY: 0.7 M/S
ECHO TV REGURGITANT MAX VELOCITY: 2.64 M/S
ECHO TV REGURGITANT PEAK GRADIENT: 28 MMHG

## 2025-08-13 PROCEDURE — 93306 TTE W/DOPPLER COMPLETE: CPT | Performed by: INTERNAL MEDICINE

## (undated) DEVICE — STRAP,POSITIONING,KNEE/BODY,FOAM,4X60": Brand: MEDLINE

## (undated) DEVICE — KIT,1200CC CANISTER,3/16"X6' TUBING: Brand: MEDLINE INDUSTRIES, INC.

## (undated) DEVICE — TOTAL TRAY, 16FR 10ML SIL FOLEY, URN: Brand: MEDLINE

## (undated) DEVICE — HANDLE LT SNAP ON ULT DURABLE LENS FOR TRUMPF ALC DISPOSABLE

## (undated) DEVICE — SUTURE VCRL SZ 2-0 L18IN ABSRB UD L26MM CP-2 1/2 CIR REV J762D

## (undated) DEVICE — BONE WAX WHITE: Brand: BONE WAX WHITE

## (undated) DEVICE — SOLUTION IV 1000ML 0.9% SOD CHL

## (undated) DEVICE — SYRINGE MED 20ML STD CLR PLAS LUERLOCK TIP N CTRL DISP

## (undated) DEVICE — DRAPE SURG W41XL74IN CLR FULL SZ C ARM 3 ADH POLY STRP E

## (undated) DEVICE — SUTURE MCRYL SZ 4-0 L27IN ABSRB UD L19MM PS-2 1/2 CIR PRIM Y426H

## (undated) DEVICE — DRAPE,LAPAROTOMY,PED,STERILE: Brand: MEDLINE

## (undated) DEVICE — COVER,TABLE,HEAVY DUTY,60"X90",STRL: Brand: MEDLINE

## (undated) DEVICE — TELFA ADHESIVE ISLAND DRESSING: Brand: TELFA

## (undated) DEVICE — LAMINECTOMY RICHMOND-LF: Brand: MEDLINE INDUSTRIES, INC.

## (undated) DEVICE — TOOL 14MH30 LEGEND 14CM 3MM: Brand: MIDAS REX ™

## (undated) DEVICE — DRAPE MICSCP W46XL120IN POLY DRAWSTRAP W STEREO OBS TB AND

## (undated) DEVICE — STERILE POLYISOPRENE POWDER-FREE SURGICAL GLOVES WITH EMOLLIENT COATING: Brand: PROTEXIS

## (undated) DEVICE — NEEDLE SPNL 20GA L3.5IN YEL HUB S STL REG WALL FIT STYL W/

## (undated) DEVICE — PREP SKN PREVAIL 40ML APPL --

## (undated) DEVICE — INTENDED FOR TISSUE SEPARATION, AND OTHER PROCEDURES THAT REQUIRE A SHARP SURGICAL BLADE TO PUNCTURE OR CUT.: Brand: BARD-PARKER ® CARBON RIB-BACK BLADES

## (undated) DEVICE — APPLICATOR BNDG 1MM ADH PREMIERPRO EXOFIN

## (undated) DEVICE — FLOSEAL MATRIX IS INDICATED IN SURGICAL PROCEDURES (OTHER THAN IN OPHTHALMIC) AS AN ADJUNCT TO HEMOSTASIS WHEN CONTROL OF BLEEDING BY LIGATURE OR CONVENTIONALPROCEDURES IS INEFFECTIVE OR IMPRACTICAL.: Brand: FLOSEAL HEMOSTATIC MATRIX

## (undated) DEVICE — HOLDING PIN

## (undated) DEVICE — INSULATED BLADE ELECTRODE: Brand: EDGE

## (undated) DEVICE — DRAIN KT WND 10FR RND 400ML --

## (undated) DEVICE — GARMENT,MEDLINE,DVT,INT,CALF,MED, GEN2: Brand: MEDLINE

## (undated) DEVICE — INFECTION CONTROL KIT SYS

## (undated) DEVICE — SCREW EXT FIX L14MM FOR DISTRCTN

## (undated) DEVICE — BIPOLAR IRRIGATOR INTEGRATED TUBING AND BIPOLAR CORD SET, DISPOSABLE

## (undated) DEVICE — SOLUTION IV 250ML 0.9% SOD CHL CLR INJ FLX BG CONT PRT CLSR

## (undated) DEVICE — SPONGE: SPECIALTY PEANUT XR 100/CS: Brand: MEDICAL ACTION INDUSTRIES

## (undated) DEVICE — BIT DRL L12MM DIA2.2MM BLU FLAT CHK FOR ANT CERV PLT SYS

## (undated) DEVICE — TUBING, SUCTION, 1/4" X 10', STRAIGHT: Brand: MEDLINE